# Patient Record
Sex: MALE | Race: WHITE | NOT HISPANIC OR LATINO | Employment: OTHER | URBAN - METROPOLITAN AREA
[De-identification: names, ages, dates, MRNs, and addresses within clinical notes are randomized per-mention and may not be internally consistent; named-entity substitution may affect disease eponyms.]

---

## 2018-01-25 ENCOUNTER — APPOINTMENT (OUTPATIENT)
Dept: RADIOLOGY | Facility: MEDICAL CENTER | Age: 68
DRG: 871 | End: 2018-01-25
Attending: EMERGENCY MEDICINE
Payer: MEDICARE

## 2018-01-25 ENCOUNTER — HOSPITAL ENCOUNTER (INPATIENT)
Facility: MEDICAL CENTER | Age: 68
LOS: 5 days | DRG: 871 | End: 2018-01-31
Attending: EMERGENCY MEDICINE | Admitting: HOSPITALIST
Payer: MEDICARE

## 2018-01-25 DIAGNOSIS — N25.0 RENAL OSTEODYSTROPHY: ICD-10-CM

## 2018-01-25 DIAGNOSIS — I50.23 ACUTE ON CHRONIC SYSTOLIC HEART FAILURE (HCC): ICD-10-CM

## 2018-01-25 DIAGNOSIS — R09.02 HYPOXIA: ICD-10-CM

## 2018-01-25 DIAGNOSIS — G47.33 OBSTRUCTIVE SLEEP APNEA: Chronic | ICD-10-CM
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
## 2018-01-25 DIAGNOSIS — N17.9 AKI (ACUTE KIDNEY INJURY) (HCC): ICD-10-CM

## 2018-01-25 DIAGNOSIS — J11.1 INFLUENZA: ICD-10-CM

## 2018-01-25 DIAGNOSIS — I50.21 ACUTE SYSTOLIC HEART FAILURE (HCC): ICD-10-CM

## 2018-01-25 DIAGNOSIS — R53.1 GENERALIZED WEAKNESS: ICD-10-CM

## 2018-01-25 DIAGNOSIS — E66.9 OBESITY, UNSPECIFIED CLASSIFICATION, UNSPECIFIED OBESITY TYPE, UNSPECIFIED WHETHER SERIOUS COMORBIDITY PRESENT: ICD-10-CM

## 2018-01-25 LAB
ALBUMIN SERPL BCP-MCNC: 3.2 G/DL (ref 3.2–4.9)
ALBUMIN/GLOB SERPL: 0.8 G/DL
ALP SERPL-CCNC: 74 U/L (ref 30–99)
ALT SERPL-CCNC: 11 U/L (ref 2–50)
ANION GAP SERPL CALC-SCNC: 8 MMOL/L (ref 0–11.9)
APPEARANCE UR: CLEAR
AST SERPL-CCNC: 13 U/L (ref 12–45)
BACTERIA #/AREA URNS HPF: NEGATIVE /HPF
BASOPHILS # BLD AUTO: 0.5 % (ref 0–1.8)
BASOPHILS # BLD: 0.04 K/UL (ref 0–0.12)
BILIRUB SERPL-MCNC: 0.3 MG/DL (ref 0.1–1.5)
BILIRUB UR QL STRIP.AUTO: NEGATIVE
BUN SERPL-MCNC: 59 MG/DL (ref 8–22)
CALCIUM SERPL-MCNC: 9.5 MG/DL (ref 8.5–10.5)
CHLORIDE SERPL-SCNC: 109 MMOL/L (ref 96–112)
CO2 SERPL-SCNC: 18 MMOL/L (ref 20–33)
COLOR UR: YELLOW
CREAT SERPL-MCNC: 5.66 MG/DL (ref 0.5–1.4)
EOSINOPHIL # BLD AUTO: 0.46 K/UL (ref 0–0.51)
EOSINOPHIL NFR BLD: 5.9 % (ref 0–6.9)
EPI CELLS #/AREA URNS HPF: NEGATIVE /HPF
ERYTHROCYTE [DISTWIDTH] IN BLOOD BY AUTOMATED COUNT: 44.2 FL (ref 35.9–50)
FLUAV RNA SPEC QL NAA+PROBE: POSITIVE
FLUBV RNA SPEC QL NAA+PROBE: NEGATIVE
GLOBULIN SER CALC-MCNC: 4.1 G/DL (ref 1.9–3.5)
GLUCOSE SERPL-MCNC: 129 MG/DL (ref 65–99)
GLUCOSE UR STRIP.AUTO-MCNC: 250 MG/DL
HCT VFR BLD AUTO: 31.3 % (ref 42–52)
HGB BLD-MCNC: 10.1 G/DL (ref 14–18)
HYALINE CASTS #/AREA URNS LPF: ABNORMAL /LPF
IMM GRANULOCYTES # BLD AUTO: 0.08 K/UL (ref 0–0.11)
IMM GRANULOCYTES NFR BLD AUTO: 1 % (ref 0–0.9)
INR PPP: 2.9 (ref 0.87–1.13)
KETONES UR STRIP.AUTO-MCNC: NEGATIVE MG/DL
LACTATE BLD-SCNC: 0.9 MMOL/L (ref 0.5–2)
LACTATE BLD-SCNC: 1.2 MMOL/L (ref 0.5–2)
LEUKOCYTE ESTERASE UR QL STRIP.AUTO: NEGATIVE
LYMPHOCYTES # BLD AUTO: 0.74 K/UL (ref 1–4.8)
LYMPHOCYTES NFR BLD: 9.5 % (ref 22–41)
MCH RBC QN AUTO: 29 PG (ref 27–33)
MCHC RBC AUTO-ENTMCNC: 32.3 G/DL (ref 33.7–35.3)
MCV RBC AUTO: 89.9 FL (ref 81.4–97.8)
MICRO URNS: ABNORMAL
MONOCYTES # BLD AUTO: 0.82 K/UL (ref 0–0.85)
MONOCYTES NFR BLD AUTO: 10.5 % (ref 0–13.4)
NEUTROPHILS # BLD AUTO: 5.67 K/UL (ref 1.82–7.42)
NEUTROPHILS NFR BLD: 72.6 % (ref 44–72)
NITRITE UR QL STRIP.AUTO: NEGATIVE
NRBC # BLD AUTO: 0 K/UL
NRBC BLD-RTO: 0 /100 WBC
PH UR STRIP.AUTO: 6 [PH]
PLATELET # BLD AUTO: 187 K/UL (ref 164–446)
PMV BLD AUTO: 11.2 FL (ref 9–12.9)
POTASSIUM SERPL-SCNC: 5.5 MMOL/L (ref 3.6–5.5)
PROT SERPL-MCNC: 7.3 G/DL (ref 6–8.2)
PROT UR QL STRIP: >=1000 MG/DL
PROTHROMBIN TIME: 30 SEC (ref 12–14.6)
RBC # BLD AUTO: 3.48 M/UL (ref 4.7–6.1)
RBC # URNS HPF: ABNORMAL /HPF
RBC UR QL AUTO: ABNORMAL
SODIUM SERPL-SCNC: 135 MMOL/L (ref 135–145)
SP GR UR STRIP.AUTO: 1.02
UROBILINOGEN UR STRIP.AUTO-MCNC: 0.2 MG/DL
WBC # BLD AUTO: 7.8 K/UL (ref 4.8–10.8)
WBC #/AREA URNS HPF: ABNORMAL /HPF

## 2018-01-25 PROCEDURE — 36415 COLL VENOUS BLD VENIPUNCTURE: CPT

## 2018-01-25 PROCEDURE — 700105 HCHG RX REV CODE 258: Performed by: EMERGENCY MEDICINE

## 2018-01-25 PROCEDURE — 85025 COMPLETE CBC W/AUTO DIFF WBC: CPT

## 2018-01-25 PROCEDURE — 304561 HCHG STAT O2

## 2018-01-25 PROCEDURE — 74176 CT ABD & PELVIS W/O CONTRAST: CPT

## 2018-01-25 PROCEDURE — 70450 CT HEAD/BRAIN W/O DYE: CPT

## 2018-01-25 PROCEDURE — 96365 THER/PROPH/DIAG IV INF INIT: CPT

## 2018-01-25 PROCEDURE — 81001 URINALYSIS AUTO W/SCOPE: CPT

## 2018-01-25 PROCEDURE — 700111 HCHG RX REV CODE 636 W/ 250 OVERRIDE (IP): Performed by: EMERGENCY MEDICINE

## 2018-01-25 PROCEDURE — 87086 URINE CULTURE/COLONY COUNT: CPT

## 2018-01-25 PROCEDURE — 71045 X-RAY EXAM CHEST 1 VIEW: CPT

## 2018-01-25 PROCEDURE — 96367 TX/PROPH/DG ADDL SEQ IV INF: CPT

## 2018-01-25 PROCEDURE — 83605 ASSAY OF LACTIC ACID: CPT

## 2018-01-25 PROCEDURE — 87040 BLOOD CULTURE FOR BACTERIA: CPT

## 2018-01-25 PROCEDURE — 87502 INFLUENZA DNA AMP PROBE: CPT

## 2018-01-25 PROCEDURE — 85610 PROTHROMBIN TIME: CPT

## 2018-01-25 PROCEDURE — 96366 THER/PROPH/DIAG IV INF ADDON: CPT

## 2018-01-25 PROCEDURE — A9270 NON-COVERED ITEM OR SERVICE: HCPCS | Performed by: EMERGENCY MEDICINE

## 2018-01-25 PROCEDURE — 99285 EMERGENCY DEPT VISIT HI MDM: CPT

## 2018-01-25 PROCEDURE — 700102 HCHG RX REV CODE 250 W/ 637 OVERRIDE(OP): Performed by: EMERGENCY MEDICINE

## 2018-01-25 PROCEDURE — 80053 COMPREHEN METABOLIC PANEL: CPT

## 2018-01-25 RX ORDER — ACETAMINOPHEN 325 MG/1
975 TABLET ORAL ONCE
Status: COMPLETED | OUTPATIENT
Start: 2018-01-25 | End: 2018-01-25

## 2018-01-25 RX ORDER — SODIUM CHLORIDE 9 MG/ML
500 INJECTION, SOLUTION INTRAVENOUS ONCE
Status: COMPLETED | OUTPATIENT
Start: 2018-01-25 | End: 2018-01-25

## 2018-01-25 RX ORDER — OSELTAMIVIR PHOSPHATE 30 MG/1
30 CAPSULE ORAL ONCE
Status: COMPLETED | OUTPATIENT
Start: 2018-01-25 | End: 2018-01-25

## 2018-01-25 RX ADMIN — TAZOBACTAM SODIUM AND PIPERACILLIN SODIUM 3.38 G: 375; 3 INJECTION, SOLUTION INTRAVENOUS at 20:38

## 2018-01-25 RX ADMIN — VANCOMYCIN HYDROCHLORIDE 2700 MG: 100 INJECTION, POWDER, LYOPHILIZED, FOR SOLUTION INTRAVENOUS at 21:07

## 2018-01-25 RX ADMIN — OSELTAMIVIR PHOSPHATE 30 MG: 30 CAPSULE ORAL at 22:27

## 2018-01-25 RX ADMIN — ACETAMINOPHEN 975 MG: 325 TABLET, FILM COATED ORAL at 20:37

## 2018-01-25 RX ADMIN — SODIUM CHLORIDE 500 ML: 9 INJECTION, SOLUTION INTRAVENOUS at 19:55

## 2018-01-25 ASSESSMENT — PAIN SCALES - GENERAL: PAINLEVEL_OUTOF10: 0

## 2018-01-26 ENCOUNTER — APPOINTMENT (OUTPATIENT)
Dept: RADIOLOGY | Facility: MEDICAL CENTER | Age: 68
DRG: 871 | End: 2018-01-26
Attending: STUDENT IN AN ORGANIZED HEALTH CARE EDUCATION/TRAINING PROGRAM
Payer: MEDICARE

## 2018-01-26 ENCOUNTER — RESOLUTE PROFESSIONAL BILLING HOSPITAL PROF FEE (OUTPATIENT)
Dept: HOSPITALIST | Facility: MEDICAL CENTER | Age: 68
End: 2018-01-26
Payer: COMMERCIAL

## 2018-01-26 PROBLEM — E03.9 HYPOTHYROIDISM: Chronic | Status: ACTIVE | Noted: 2018-01-26

## 2018-01-26 PROBLEM — R09.02 HYPOXIA: Status: ACTIVE | Noted: 2018-01-26

## 2018-01-26 PROBLEM — E03.9 HYPOTHYROIDISM: Status: ACTIVE | Noted: 2018-01-26

## 2018-01-26 PROBLEM — B19.20 HEPATITIS C: Chronic | Status: ACTIVE | Noted: 2018-01-26

## 2018-01-26 PROBLEM — R53.1 GENERALIZED WEAKNESS: Status: ACTIVE | Noted: 2018-01-26

## 2018-01-26 PROBLEM — B19.20 HEPATITIS C: Status: ACTIVE | Noted: 2018-01-26

## 2018-01-26 PROBLEM — R31.9 HEMATURIA: Status: ACTIVE | Noted: 2018-01-26

## 2018-01-26 PROBLEM — I10 HYPERTENSION: Chronic | Status: ACTIVE | Noted: 2018-01-26

## 2018-01-26 PROBLEM — J11.1 INFLUENZA: Status: RESOLVED | Noted: 2018-01-26 | Resolved: 2018-01-26

## 2018-01-26 PROBLEM — Z95.2 H/O AORTIC VALVE REPLACEMENT: Status: ACTIVE | Noted: 2018-01-26

## 2018-01-26 PROBLEM — E11.9 DIABETES (HCC): Status: ACTIVE | Noted: 2018-01-26

## 2018-01-26 PROBLEM — E78.5 HYPERLIPIDEMIA: Status: ACTIVE | Noted: 2018-01-26

## 2018-01-26 PROBLEM — G47.33 OBSTRUCTIVE SLEEP APNEA: Chronic | Status: ACTIVE | Noted: 2018-01-26

## 2018-01-26 PROBLEM — J11.1 INFLUENZA: Status: ACTIVE | Noted: 2018-01-26

## 2018-01-26 PROBLEM — N25.0 RENAL OSTEODYSTROPHY: Status: ACTIVE | Noted: 2018-01-26
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
## 2018-01-26 PROBLEM — N18.6 ESRD (END STAGE RENAL DISEASE) (HCC): Status: ACTIVE | Noted: 2018-01-26

## 2018-01-26 PROBLEM — R53.1 GENERALIZED WEAKNESS: Status: RESOLVED | Noted: 2018-01-26 | Resolved: 2018-01-26

## 2018-01-26 PROBLEM — G47.33 OBSTRUCTIVE SLEEP APNEA: Status: ACTIVE | Noted: 2018-01-26

## 2018-01-26 PROBLEM — D64.9 ANEMIA: Status: ACTIVE | Noted: 2018-01-26

## 2018-01-26 PROBLEM — E78.5 HYPERLIPIDEMIA: Chronic | Status: ACTIVE | Noted: 2018-01-26

## 2018-01-26 PROBLEM — I44.2 COMPLETE HEART BLOCK (HCC): Status: ACTIVE | Noted: 2018-01-26

## 2018-01-26 PROBLEM — E66.9 OBESITY: Status: ACTIVE | Noted: 2018-01-26

## 2018-01-26 PROBLEM — A41.9 SEPSIS (HCC): Status: ACTIVE | Noted: 2018-01-26

## 2018-01-26 PROBLEM — I10 HYPERTENSION: Status: ACTIVE | Noted: 2018-01-26

## 2018-01-26 LAB
25(OH)D3 SERPL-MCNC: 24 NG/ML (ref 30–100)
ALBUMIN SERPL BCP-MCNC: 3.3 G/DL (ref 3.2–4.9)
ALBUMIN/GLOB SERPL: 1.1 G/DL
ALP SERPL-CCNC: 53 U/L (ref 30–99)
ALT SERPL-CCNC: 13 U/L (ref 2–50)
ANION GAP SERPL CALC-SCNC: 8 MMOL/L (ref 0–11.9)
AST SERPL-CCNC: 18 U/L (ref 12–45)
BASE EXCESS BLDA CALC-SCNC: -10 MMOL/L (ref -4–3)
BASOPHILS # BLD AUTO: 0.8 % (ref 0–1.8)
BASOPHILS # BLD: 0.05 K/UL (ref 0–0.12)
BILIRUB SERPL-MCNC: 0.3 MG/DL (ref 0.1–1.5)
BODY TEMPERATURE: ABNORMAL CENTIGRADE
BUN SERPL-MCNC: 59 MG/DL (ref 8–22)
CALCIUM SERPL-MCNC: 8.5 MG/DL (ref 8.5–10.5)
CHLORIDE SERPL-SCNC: 116 MMOL/L (ref 96–112)
CHOLEST SERPL-MCNC: 145 MG/DL (ref 100–199)
CO2 SERPL-SCNC: 15 MMOL/L (ref 20–33)
CREAT SERPL-MCNC: 5.29 MG/DL (ref 0.5–1.4)
EKG IMPRESSION: NORMAL
EOSINOPHIL # BLD AUTO: 0.33 K/UL (ref 0–0.51)
EOSINOPHIL NFR BLD: 5.3 % (ref 0–6.9)
ERYTHROCYTE [DISTWIDTH] IN BLOOD BY AUTOMATED COUNT: 45.5 FL (ref 35.9–50)
EST. AVERAGE GLUCOSE BLD GHB EST-MCNC: 143 MG/DL
FERRITIN SERPL-MCNC: 43.1 NG/ML (ref 22–322)
FOLATE SERPL-MCNC: >23.5 NG/ML
GLOBULIN SER CALC-MCNC: 2.9 G/DL (ref 1.9–3.5)
GLUCOSE BLD-MCNC: 106 MG/DL (ref 65–99)
GLUCOSE BLD-MCNC: 108 MG/DL (ref 65–99)
GLUCOSE BLD-MCNC: 162 MG/DL (ref 65–99)
GLUCOSE BLD-MCNC: 178 MG/DL (ref 65–99)
GLUCOSE SERPL-MCNC: 98 MG/DL (ref 65–99)
HBA1C MFR BLD: 6.6 % (ref 0–5.6)
HCO3 BLDA-SCNC: 15 MMOL/L (ref 17–25)
HCT VFR BLD AUTO: 28.8 % (ref 42–52)
HDLC SERPL-MCNC: 19 MG/DL
HGB BLD-MCNC: 9.3 G/DL (ref 14–18)
IMM GRANULOCYTES # BLD AUTO: 0.05 K/UL (ref 0–0.11)
IMM GRANULOCYTES NFR BLD AUTO: 0.8 % (ref 0–0.9)
INHALED O2 FLOW RATE: 3 L/MIN (ref 2–10)
INR PPP: 2.41 (ref 0.87–1.13)
IRON SATN MFR SERPL: 14 % (ref 15–55)
IRON SERPL-MCNC: 36 UG/DL (ref 50–180)
LDLC SERPL CALC-MCNC: 85 MG/DL
LYMPHOCYTES # BLD AUTO: 1.12 K/UL (ref 1–4.8)
LYMPHOCYTES NFR BLD: 18.2 % (ref 22–41)
MAGNESIUM SERPL-MCNC: 1.8 MG/DL (ref 1.5–2.5)
MCH RBC QN AUTO: 29.4 PG (ref 27–33)
MCHC RBC AUTO-ENTMCNC: 32.3 G/DL (ref 33.7–35.3)
MCV RBC AUTO: 91.1 FL (ref 81.4–97.8)
MONOCYTES # BLD AUTO: 0.87 K/UL (ref 0–0.85)
MONOCYTES NFR BLD AUTO: 14.1 % (ref 0–13.4)
NEUTROPHILS # BLD AUTO: 3.75 K/UL (ref 1.82–7.42)
NEUTROPHILS NFR BLD: 60.8 % (ref 44–72)
NRBC # BLD AUTO: 0 K/UL
NRBC BLD-RTO: 0 /100 WBC
PCO2 BLDA: 26.8 MMHG (ref 26–37)
PH BLDA: 7.35 [PH] (ref 7.4–7.5)
PHOSPHATE SERPL-MCNC: 5.9 MG/DL (ref 2.5–4.5)
PLATELET # BLD AUTO: 169 K/UL (ref 164–446)
PMV BLD AUTO: 11.9 FL (ref 9–12.9)
PO2 BLDA: 70 MMHG (ref 64–87)
POTASSIUM SERPL-SCNC: 5.2 MMOL/L (ref 3.6–5.5)
PROCALCITONIN SERPL-MCNC: 0.47 NG/ML
PROT SERPL-MCNC: 6.2 G/DL (ref 6–8.2)
PROTHROMBIN TIME: 25.9 SEC (ref 12–14.6)
PTH-INTACT SERPL-MCNC: 468.2 PG/ML (ref 14–72)
RBC # BLD AUTO: 3.16 M/UL (ref 4.7–6.1)
SAO2 % BLDA: 92.8 % (ref 93–99)
SODIUM SERPL-SCNC: 139 MMOL/L (ref 135–145)
TIBC SERPL-MCNC: 262 UG/DL (ref 250–450)
TRIGL SERPL-MCNC: 204 MG/DL (ref 0–149)
VIT B12 SERPL-MCNC: 370 PG/ML (ref 211–911)
WBC # BLD AUTO: 6.2 K/UL (ref 4.8–10.8)

## 2018-01-26 PROCEDURE — 82607 VITAMIN B-12: CPT

## 2018-01-26 PROCEDURE — 83036 HEMOGLOBIN GLYCOSYLATED A1C: CPT

## 2018-01-26 PROCEDURE — 700111 HCHG RX REV CODE 636 W/ 250 OVERRIDE (IP): Performed by: STUDENT IN AN ORGANIZED HEALTH CARE EDUCATION/TRAINING PROGRAM

## 2018-01-26 PROCEDURE — 80053 COMPREHEN METABOLIC PANEL: CPT

## 2018-01-26 PROCEDURE — 82803 BLOOD GASES ANY COMBINATION: CPT

## 2018-01-26 PROCEDURE — A9270 NON-COVERED ITEM OR SERVICE: HCPCS | Performed by: STUDENT IN AN ORGANIZED HEALTH CARE EDUCATION/TRAINING PROGRAM

## 2018-01-26 PROCEDURE — 83550 IRON BINDING TEST: CPT

## 2018-01-26 PROCEDURE — 83540 ASSAY OF IRON: CPT

## 2018-01-26 PROCEDURE — 82306 VITAMIN D 25 HYDROXY: CPT

## 2018-01-26 PROCEDURE — 99223 1ST HOSP IP/OBS HIGH 75: CPT | Mod: GC | Performed by: HOSPITALIST

## 2018-01-26 PROCEDURE — 76775 US EXAM ABDO BACK WALL LIM: CPT

## 2018-01-26 PROCEDURE — 93010 ELECTROCARDIOGRAM REPORT: CPT | Performed by: INTERNAL MEDICINE

## 2018-01-26 PROCEDURE — 700111 HCHG RX REV CODE 636 W/ 250 OVERRIDE (IP): Performed by: INTERNAL MEDICINE

## 2018-01-26 PROCEDURE — 82728 ASSAY OF FERRITIN: CPT

## 2018-01-26 PROCEDURE — A9270 NON-COVERED ITEM OR SERVICE: HCPCS | Performed by: HOSPITALIST

## 2018-01-26 PROCEDURE — 51798 US URINE CAPACITY MEASURE: CPT

## 2018-01-26 PROCEDURE — 83735 ASSAY OF MAGNESIUM: CPT

## 2018-01-26 PROCEDURE — 700102 HCHG RX REV CODE 250 W/ 637 OVERRIDE(OP): Performed by: HOSPITALIST

## 2018-01-26 PROCEDURE — 84145 PROCALCITONIN (PCT): CPT

## 2018-01-26 PROCEDURE — 83970 ASSAY OF PARATHORMONE: CPT

## 2018-01-26 PROCEDURE — 71045 X-RAY EXAM CHEST 1 VIEW: CPT

## 2018-01-26 PROCEDURE — 93005 ELECTROCARDIOGRAM TRACING: CPT | Performed by: INTERNAL MEDICINE

## 2018-01-26 PROCEDURE — 700105 HCHG RX REV CODE 258: Performed by: INTERNAL MEDICINE

## 2018-01-26 PROCEDURE — 36415 COLL VENOUS BLD VENIPUNCTURE: CPT

## 2018-01-26 PROCEDURE — 85610 PROTHROMBIN TIME: CPT

## 2018-01-26 PROCEDURE — 700105 HCHG RX REV CODE 258: Performed by: STUDENT IN AN ORGANIZED HEALTH CARE EDUCATION/TRAINING PROGRAM

## 2018-01-26 PROCEDURE — 80061 LIPID PANEL: CPT

## 2018-01-26 PROCEDURE — 700102 HCHG RX REV CODE 250 W/ 637 OVERRIDE(OP): Performed by: STUDENT IN AN ORGANIZED HEALTH CARE EDUCATION/TRAINING PROGRAM

## 2018-01-26 PROCEDURE — 85025 COMPLETE CBC W/AUTO DIFF WBC: CPT

## 2018-01-26 PROCEDURE — 84100 ASSAY OF PHOSPHORUS: CPT

## 2018-01-26 PROCEDURE — 770020 HCHG ROOM/CARE - TELE (206)

## 2018-01-26 PROCEDURE — 82962 GLUCOSE BLOOD TEST: CPT | Mod: 91

## 2018-01-26 PROCEDURE — 82746 ASSAY OF FOLIC ACID SERUM: CPT

## 2018-01-26 RX ORDER — CALCITRIOL 0.25 UG/1
.25-.5 CAPSULE, LIQUID FILLED ORAL SEE ADMIN INSTRUCTIONS
COMMUNITY

## 2018-01-26 RX ORDER — LEVOTHYROXINE SODIUM 0.12 MG/1
125 TABLET ORAL EVERY MORNING
COMMUNITY

## 2018-01-26 RX ORDER — CALCITRIOL 0.25 UG/1
0.25 CAPSULE, LIQUID FILLED ORAL EVERY MORNING
Status: DISCONTINUED | OUTPATIENT
Start: 2018-01-26 | End: 2018-01-31 | Stop reason: HOSPADM

## 2018-01-26 RX ORDER — WARFARIN SODIUM 2.5 MG/1
2.5 TABLET ORAL
Status: DISCONTINUED | OUTPATIENT
Start: 2018-01-26 | End: 2018-01-31 | Stop reason: HOSPADM

## 2018-01-26 RX ORDER — SILDENAFIL 100 MG/1
50 TABLET, FILM COATED ORAL PRN
COMMUNITY
End: 2019-01-01

## 2018-01-26 RX ORDER — LEVOTHYROXINE SODIUM 0.12 MG/1
125 TABLET ORAL EVERY MORNING
Status: DISCONTINUED | OUTPATIENT
Start: 2018-01-26 | End: 2018-01-31 | Stop reason: HOSPADM

## 2018-01-26 RX ORDER — BISACODYL 10 MG
10 SUPPOSITORY, RECTAL RECTAL
Status: DISCONTINUED | OUTPATIENT
Start: 2018-01-26 | End: 2018-01-31 | Stop reason: HOSPADM

## 2018-01-26 RX ORDER — HYDRALAZINE HYDROCHLORIDE 25 MG/1
25 TABLET, FILM COATED ORAL 3 TIMES DAILY
COMMUNITY
End: 2019-01-01

## 2018-01-26 RX ORDER — WARFARIN SODIUM 2.5 MG/1
2.5 TABLET ORAL
Status: COMPLETED | OUTPATIENT
Start: 2018-01-26 | End: 2018-01-26

## 2018-01-26 RX ORDER — WARFARIN SODIUM 5 MG/1
2.5-5 TABLET ORAL EVERY EVENING
COMMUNITY

## 2018-01-26 RX ORDER — HEPARIN SODIUM 5000 [USP'U]/ML
5000 INJECTION, SOLUTION INTRAVENOUS; SUBCUTANEOUS EVERY 8 HOURS
Status: DISCONTINUED | OUTPATIENT
Start: 2018-01-26 | End: 2018-01-26

## 2018-01-26 RX ORDER — SODIUM CHLORIDE 9 MG/ML
INJECTION, SOLUTION INTRAVENOUS CONTINUOUS
Status: DISCONTINUED | OUTPATIENT
Start: 2018-01-26 | End: 2018-01-26

## 2018-01-26 RX ORDER — ERGOCALCIFEROL 1.25 MG/1
50000 CAPSULE ORAL
COMMUNITY

## 2018-01-26 RX ORDER — LOVASTATIN 20 MG/1
40 TABLET ORAL NIGHTLY
Status: DISCONTINUED | OUTPATIENT
Start: 2018-01-26 | End: 2018-01-31 | Stop reason: HOSPADM

## 2018-01-26 RX ORDER — AMLODIPINE BESYLATE 10 MG/1
10 TABLET ORAL EVERY MORNING
Status: DISCONTINUED | OUTPATIENT
Start: 2018-01-26 | End: 2018-01-31 | Stop reason: HOSPADM

## 2018-01-26 RX ORDER — AMLODIPINE BESYLATE 10 MG/1
10 TABLET ORAL EVERY MORNING
COMMUNITY
End: 2019-01-01

## 2018-01-26 RX ORDER — METOLAZONE 2.5 MG/1
2.5 TABLET ORAL EVERY MORNING
Status: ON HOLD | COMMUNITY
End: 2018-01-31

## 2018-01-26 RX ORDER — POLYVINYL ALCOHOL 14 MG/ML
1 SOLUTION/ DROPS OPHTHALMIC 4 TIMES DAILY PRN
COMMUNITY

## 2018-01-26 RX ORDER — PREGABALIN 75 MG/1
150 CAPSULE ORAL EVERY EVENING
Status: DISCONTINUED | OUTPATIENT
Start: 2018-01-26 | End: 2018-01-31 | Stop reason: HOSPADM

## 2018-01-26 RX ORDER — OSELTAMIVIR PHOSPHATE 30 MG/1
30 CAPSULE ORAL
Status: COMPLETED | OUTPATIENT
Start: 2018-01-26 | End: 2018-01-29

## 2018-01-26 RX ORDER — FUROSEMIDE 80 MG
80 TABLET ORAL EVERY MORNING
Status: ON HOLD | COMMUNITY
End: 2018-01-31

## 2018-01-26 RX ORDER — INSULIN GLARGINE 100 [IU]/ML
35 INJECTION, SOLUTION SUBCUTANEOUS NIGHTLY
COMMUNITY

## 2018-01-26 RX ORDER — INSULIN GLARGINE 100 [IU]/ML
15 INJECTION, SOLUTION SUBCUTANEOUS NIGHTLY
Status: DISCONTINUED | OUTPATIENT
Start: 2018-01-26 | End: 2018-01-31 | Stop reason: HOSPADM

## 2018-01-26 RX ORDER — DEXTROSE MONOHYDRATE 25 G/50ML
25 INJECTION, SOLUTION INTRAVENOUS
Status: DISCONTINUED | OUTPATIENT
Start: 2018-01-26 | End: 2018-01-31 | Stop reason: HOSPADM

## 2018-01-26 RX ORDER — LOVASTATIN 40 MG/1
40 TABLET ORAL NIGHTLY
COMMUNITY
End: 2019-01-01

## 2018-01-26 RX ORDER — WARFARIN SODIUM 5 MG/1
5 TABLET ORAL
Status: DISCONTINUED | OUTPATIENT
Start: 2018-01-28 | End: 2018-01-31 | Stop reason: HOSPADM

## 2018-01-26 RX ORDER — METOPROLOL SUCCINATE 50 MG/1
150 TABLET, EXTENDED RELEASE ORAL EVERY MORNING
Status: DISCONTINUED | OUTPATIENT
Start: 2018-01-26 | End: 2018-01-31 | Stop reason: HOSPADM

## 2018-01-26 RX ORDER — LIDOCAINE 40 MG/G
1 CREAM TOPICAL 4 TIMES DAILY PRN
Status: DISCONTINUED | OUTPATIENT
Start: 2018-01-26 | End: 2018-01-31 | Stop reason: HOSPADM

## 2018-01-26 RX ORDER — AMOXICILLIN 250 MG
2 CAPSULE ORAL 2 TIMES DAILY
Status: DISCONTINUED | OUTPATIENT
Start: 2018-01-26 | End: 2018-01-31 | Stop reason: HOSPADM

## 2018-01-26 RX ORDER — ACETAMINOPHEN 325 MG/1
650 TABLET ORAL EVERY 6 HOURS PRN
Status: DISCONTINUED | OUTPATIENT
Start: 2018-01-26 | End: 2018-01-31 | Stop reason: HOSPADM

## 2018-01-26 RX ORDER — POLYVINYL ALCOHOL 14 MG/ML
1 SOLUTION/ DROPS OPHTHALMIC EVERY 4 HOURS PRN
Status: DISCONTINUED | OUTPATIENT
Start: 2018-01-26 | End: 2018-01-31 | Stop reason: HOSPADM

## 2018-01-26 RX ORDER — METOPROLOL SUCCINATE 50 MG/1
75 TABLET, EXTENDED RELEASE ORAL EVERY MORNING
COMMUNITY

## 2018-01-26 RX ORDER — AZITHROMYCIN 250 MG/1
250 TABLET, FILM COATED ORAL DAILY
Status: DISCONTINUED | OUTPATIENT
Start: 2018-01-27 | End: 2018-01-26

## 2018-01-26 RX ORDER — LIDOCAINE 40 MG/G
1 CREAM TOPICAL PRN
COMMUNITY
End: 2019-01-01

## 2018-01-26 RX ORDER — AZITHROMYCIN 250 MG/1
500 TABLET, FILM COATED ORAL ONCE
Status: DISCONTINUED | OUTPATIENT
Start: 2018-01-26 | End: 2018-01-26

## 2018-01-26 RX ORDER — LANOLIN ALCOHOL/MO/W.PET/CERES
9 CREAM (GRAM) TOPICAL
COMMUNITY
End: 2019-01-01

## 2018-01-26 RX ORDER — HYDRALAZINE HYDROCHLORIDE 25 MG/1
25 TABLET, FILM COATED ORAL 3 TIMES DAILY
Status: DISCONTINUED | OUTPATIENT
Start: 2018-01-26 | End: 2018-01-31 | Stop reason: HOSPADM

## 2018-01-26 RX ORDER — PREGABALIN 75 MG/1
75 CAPSULE ORAL EVERY MORNING
COMMUNITY

## 2018-01-26 RX ORDER — POLYETHYLENE GLYCOL 3350 17 G/17G
1 POWDER, FOR SOLUTION ORAL
Status: DISCONTINUED | OUTPATIENT
Start: 2018-01-26 | End: 2018-01-31 | Stop reason: HOSPADM

## 2018-01-26 RX ADMIN — STANDARDIZED SENNA CONCENTRATE AND DOCUSATE SODIUM 2 TABLET: 8.6; 5 TABLET, FILM COATED ORAL at 10:46

## 2018-01-26 RX ADMIN — AZITHROMYCIN FOR INJECTION INJECTION, POWDER, LYOPHILIZED, FOR SOLUTION 500 MG: 500 INJECTION INTRAVENOUS at 09:00

## 2018-01-26 RX ADMIN — CALCITRIOL 0.25 MCG: 0.25 CAPSULE, LIQUID FILLED ORAL at 10:27

## 2018-01-26 RX ADMIN — AMLODIPINE BESYLATE 10 MG: 10 TABLET ORAL at 10:28

## 2018-01-26 RX ADMIN — LOVASTATIN 40 MG: 20 TABLET ORAL at 22:14

## 2018-01-26 RX ADMIN — OSELTAMIVIR PHOSPHATE 30 MG: 30 CAPSULE ORAL at 22:14

## 2018-01-26 RX ADMIN — HYDRALAZINE HYDROCHLORIDE 25 MG: 25 TABLET, FILM COATED ORAL at 17:19

## 2018-01-26 RX ADMIN — HYDRALAZINE HYDROCHLORIDE 25 MG: 25 TABLET, FILM COATED ORAL at 10:28

## 2018-01-26 RX ADMIN — LEVOTHYROXINE SODIUM 125 MCG: 125 TABLET ORAL at 10:28

## 2018-01-26 RX ADMIN — METOPROLOL SUCCINATE 150 MG: 50 TABLET, FILM COATED, EXTENDED RELEASE ORAL at 10:46

## 2018-01-26 RX ADMIN — ASPIRIN 81 MG: 81 TABLET, COATED ORAL at 22:14

## 2018-01-26 RX ADMIN — WARFARIN SODIUM 2.5 MG: 2.5 TABLET ORAL at 04:23

## 2018-01-26 RX ADMIN — WARFARIN SODIUM 2.5 MG: 2.5 TABLET ORAL at 17:20

## 2018-01-26 RX ADMIN — SODIUM CHLORIDE: 9 INJECTION, SOLUTION INTRAVENOUS at 04:24

## 2018-01-26 RX ADMIN — CEFTRIAXONE 2 G: 2 INJECTION, POWDER, FOR SOLUTION INTRAMUSCULAR; INTRAVENOUS at 10:27

## 2018-01-26 RX ADMIN — PREGABALIN 150 MG: 75 CAPSULE ORAL at 22:14

## 2018-01-26 RX ADMIN — HYDRALAZINE HYDROCHLORIDE 25 MG: 25 TABLET, FILM COATED ORAL at 22:14

## 2018-01-26 ASSESSMENT — COGNITIVE AND FUNCTIONAL STATUS - GENERAL
DAILY ACTIVITIY SCORE: 16
MOVING FROM LYING ON BACK TO SITTING ON SIDE OF FLAT BED: A LOT
MOVING TO AND FROM BED TO CHAIR: A LOT
MOVING TO AND FROM BED TO CHAIR: A LOT
DRESSING REGULAR LOWER BODY CLOTHING: A LOT
SUGGESTED CMS G CODE MODIFIER MOBILITY: CL
TOILETING: A LOT
PERSONAL GROOMING: A LITTLE
CLIMB 3 TO 5 STEPS WITH RAILING: TOTAL
MOBILITY SCORE: 11
WALKING IN HOSPITAL ROOM: TOTAL
TOILETING: A LOT
SUGGESTED CMS G CODE MODIFIER DAILY ACTIVITY: CK
PERSONAL GROOMING: A LITTLE
WALKING IN HOSPITAL ROOM: TOTAL
HELP NEEDED FOR BATHING: A LOT
DRESSING REGULAR UPPER BODY CLOTHING: A LITTLE
STANDING UP FROM CHAIR USING ARMS: A LOT
DAILY ACTIVITIY SCORE: 16
TURNING FROM BACK TO SIDE WHILE IN FLAT BAD: A LITTLE
MOBILITY SCORE: 11
STANDING UP FROM CHAIR USING ARMS: A LOT
DRESSING REGULAR LOWER BODY CLOTHING: A LOT
CLIMB 3 TO 5 STEPS WITH RAILING: TOTAL
HELP NEEDED FOR BATHING: A LOT
MOVING FROM LYING ON BACK TO SITTING ON SIDE OF FLAT BED: A LOT
TURNING FROM BACK TO SIDE WHILE IN FLAT BAD: A LITTLE
SUGGESTED CMS G CODE MODIFIER DAILY ACTIVITY: CK
DRESSING REGULAR UPPER BODY CLOTHING: A LITTLE
SUGGESTED CMS G CODE MODIFIER MOBILITY: CL

## 2018-01-26 ASSESSMENT — COPD QUESTIONNAIRES
HAVE YOU SMOKED AT LEAST 100 CIGARETTES IN YOUR ENTIRE LIFE: NO/DON'T KNOW
DO YOU EVER COUGH UP ANY MUCUS OR PHLEGM?: NO/ONLY WITH OCCASIONAL COLDS OR INFECTIONS
COPD SCREENING SCORE: 3
DURING THE PAST 4 WEEKS HOW MUCH DID YOU FEEL SHORT OF BREATH: SOME OF THE TIME

## 2018-01-26 ASSESSMENT — ENCOUNTER SYMPTOMS
ORTHOPNEA: 0
PND: 0
WHEEZING: 0
VOMITING: 0
ABDOMINAL PAIN: 0
NAUSEA: 0
SPUTUM PRODUCTION: 0
DIZZINESS: 0
LOSS OF CONSCIOUSNESS: 0
COUGH: 1
COUGH: 0
SENSORY CHANGE: 0
BRUISES/BLEEDS EASILY: 0
FLANK PAIN: 0
PALPITATIONS: 0
DOUBLE VISION: 0
NECK PAIN: 1
DEPRESSION: 0
SPEECH CHANGE: 0
DIZZINESS: 1
CONSTIPATION: 0
WEIGHT LOSS: 0
EYE PAIN: 0
FEVER: 1
PHOTOPHOBIA: 0
POLYDIPSIA: 0
FEVER: 0
DIARRHEA: 0
BACK PAIN: 0
DIAPHORESIS: 0
SHORTNESS OF BREATH: 1
HEADACHES: 0
WEAKNESS: 1
BLURRED VISION: 0
MYALGIAS: 0
SORE THROAT: 0
CHILLS: 0
FOCAL WEAKNESS: 0
FALLS: 1
CLAUDICATION: 0
SHORTNESS OF BREATH: 0

## 2018-01-26 ASSESSMENT — LIFESTYLE VARIABLES
HAVE PEOPLE ANNOYED YOU BY CRITICIZING YOUR DRINKING: NO
TOTAL SCORE: 0
ALCOHOL_USE: YES
EVER_SMOKED: NEVER
HOW MANY TIMES IN THE PAST YEAR HAVE YOU HAD 5 OR MORE DRINKS IN A DAY: 0
TOTAL SCORE: 0
EVER HAD A DRINK FIRST THING IN THE MORNING TO STEADY YOUR NERVES TO GET RID OF A HANGOVER: NO
EVER FELT BAD OR GUILTY ABOUT YOUR DRINKING: NO
HAVE YOU EVER FELT YOU SHOULD CUT DOWN ON YOUR DRINKING: NO
CONSUMPTION TOTAL: INCOMPLETE
TOTAL SCORE: 0

## 2018-01-26 ASSESSMENT — PATIENT HEALTH QUESTIONNAIRE - PHQ9
SUM OF ALL RESPONSES TO PHQ QUESTIONS 1-9: 0
1. LITTLE INTEREST OR PLEASURE IN DOING THINGS: NOT AT ALL
2. FEELING DOWN, DEPRESSED, IRRITABLE, OR HOPELESS: NOT AT ALL
SUM OF ALL RESPONSES TO PHQ9 QUESTIONS 1 AND 2: 0

## 2018-01-26 ASSESSMENT — PAIN SCALES - GENERAL
PAINLEVEL_OUTOF10: 4
PAINLEVEL_OUTOF10: 0

## 2018-01-26 NOTE — ASSESSMENT & PLAN NOTE
Likely secondary to influenza A infection with possible superimposed community acquired pneumonia. Chest x-ray shows bibasilar infiltrates. However, white count was normal on admission and patient was afebrile. Blood cultures were negative. Pro-calcitonin was 0.26, elevated.    - blood cultures show NGTD  - continue to wean oxygen as tolerated  - ECHO shows LVEF of 45%, hypokinetic apex  - ABG shows pH of 7.36, pCO2 of 26, HCO3 of 15, no evidence of chronic hypercapnia however possibly compensated due to chronically elevated BUN due to kidney failure  - continue tamiflu, rocephin, doxy - last doses  - rt protocol, continuous O2, IS  - continue diuresis

## 2018-01-26 NOTE — ED PROVIDER NOTES
"ER Provider Note     Scribed for Neil Mathis M.D. by Mio Lozano. 1/25/2018, 7:51 PM.    Primary Care Provider: None noted  Means of Arrival: EMS   History obtained from: Wife  History limited by: Altered mental status    CHIEF COMPLAINT  Chief Complaint   Patient presents with   • Weakness   • Fever       HPI  Efrain Martines is a 68 y.o. male with a history of congestive heart failure and diabetes who presents to the Emergency Department for evaluation of generalized weakness and altered mental status onset today. Wife reports patient fell onto his right side, striking his right cheek on 1/16/18. At that time, he was complaining of right sided rib pain. He has not had any xrays for this injury until today. Wife states [facility] He was initially okay at that time. Wife states at approximately 5 PM, patient was yelling in the living room, making wife believe he fell. However, patient did not fall and was sitting down watching TV. At approximately 6 PM, wife noticed patient with associated generalized body shaking. She was unable to get patient to stand up. Wife checked his sugar which was 137 at that time. She also mentions that patient was looking for something \"but did not know what he was looking for.\" Wife notes patient with a history of baseline confusion but states his confusion was increased today. She states patient reported an episode of vomiting earlier today. He then tried vomiting at 6 PM with no output. On exam, wife states patient's confusion is improved and closer to baseline. She mentions patient with a cough over the last two days. He has a fever today as well. Wife denies any dysuria, headache, or neck stiffness. Patient mentions having \"burning\" to his bilateral eyes. When asked, patient states he is unsure why he is in the ED today. Patient uses a CPAP at night. He has a pacemaker in place. He is a dialysis patient. Wife reports history " "of liver problems. She states patient was last admitted in 12/2017.    History is limited secondary to altered mental status.       REVIEW OF SYSTEMS  See HPI for further details.     ROS is limited secondary to altered mental status.   C    PAST MEDICAL HISTORY   has a past medical history of Cerebral aneurysm (1990's); Cervical radiculopathy; Chronic hepatitis C (CMS-HCC); Chronic kidney disease; CVA (cerebral vascular accident) (CMS-HCC) (1993); DM (diabetes mellitus) (CMS-HCC); HTN (hypertension); Hyperlipidemia; Hypothyroidism; Obstructive sleep apnea; Pacemaker; and Peripheral neuropathy (CMS-HCC).Liver problems  Dialysis patient  Pacemaker  Congestive heart failure  Diabetes    SURGICAL HISTORY   has a past surgical history that includes nephrectomy laparoscopic (Left); aortic valve replacement; and mitral valve replacement.    SOCIAL HISTORY  Accompanied by wife.     FAMILY HISTORY  No pertinent family history reported.     CURRENT MEDICATIONS  Reviewed.  See Encounter Summary.     ALLERGIES  No Known Allergies    PHYSICAL EXAM  VITAL SIGNS: BP (!) 166/87   Pulse (!) 107   Temp (!) 39.6 °C (103.3 °F)   Resp 18   Ht 1.803 m (5' 11\")   Wt 108 kg (238 lb 1.6 oz)   SpO2 92%   BMI 33.21 kg/m²    Constitutional: Alert in no apparent distress.  HENT: No signs of trauma to head, Bilateral external ears normal, Nose normal.   Eyes: Pupils are equal and reactive, Conjunctiva normal, Non-icteric.   Neck: Normal range of motion, No tenderness, Supple, No stridor. No meningismus  Lymphatic: No lymphadenopathy noted.   Cardiovascular: Tachycardic. Regular rhythm, no murmurs.   Thorax & Lungs: Slight crackles and rhonchi to bilateral lungs. No respiratory distress, No wheezing, No chest tenderness.   Abdomen: Bowel sounds present, Mildly distended, No tenderness, No masses, No pulsatile masses. No peritoneal signs.  Skin: Warm, Dry, No erythema, No rash.   Back: No bony tenderness, No CVA tenderness.   Extremities: " Intact distal pulses, 1+ edema bilateral lower extremities, No tenderness, No cyanosis.  Musculoskeletal: Good range of motion in all major joints. No tenderness to palpation or major deformities noted.   Neurologic: Alert , able to follow commands. Moving upper and lower extremities, Normal sensory function, No focal deficits noted.   Psychiatric: Mildly confused      DIAGNOSTIC STUDIES / PROCEDURES    LABS  Results for orders placed or performed during the hospital encounter of 01/25/18   Lactic acid (lactate)   Result Value Ref Range    Lactic Acid 1.2 0.5 - 2.0 mmol/L   Lactic acid (lactate)   Result Value Ref Range    Lactic Acid 0.9 0.5 - 2.0 mmol/L   CBC WITH DIFFERENTIAL   Result Value Ref Range    WBC 7.8 4.8 - 10.8 K/uL    RBC 3.48 (L) 4.70 - 6.10 M/uL    Hemoglobin 10.1 (L) 14.0 - 18.0 g/dL    Hematocrit 31.3 (L) 42.0 - 52.0 %    MCV 89.9 81.4 - 97.8 fL    MCH 29.0 27.0 - 33.0 pg    MCHC 32.3 (L) 33.7 - 35.3 g/dL    RDW 44.2 35.9 - 50.0 fL    Platelet Count 187 164 - 446 K/uL    MPV 11.2 9.0 - 12.9 fL    Neutrophils-Polys 72.60 (H) 44.00 - 72.00 %    Lymphocytes 9.50 (L) 22.00 - 41.00 %    Monocytes 10.50 0.00 - 13.40 %    Eosinophils 5.90 0.00 - 6.90 %    Basophils 0.50 0.00 - 1.80 %    Immature Granulocytes 1.00 (H) 0.00 - 0.90 %    Nucleated RBC 0.00 /100 WBC    Neutrophils (Absolute) 5.67 1.82 - 7.42 K/uL    Lymphs (Absolute) 0.74 (L) 1.00 - 4.80 K/uL    Monos (Absolute) 0.82 0.00 - 0.85 K/uL    Eos (Absolute) 0.46 0.00 - 0.51 K/uL    Baso (Absolute) 0.04 0.00 - 0.12 K/uL    Immature Granulocytes (abs) 0.08 0.00 - 0.11 K/uL    NRBC (Absolute) 0.00 K/uL   COMP METABOLIC PANEL   Result Value Ref Range    Sodium 135 135 - 145 mmol/L    Potassium 5.5 3.6 - 5.5 mmol/L    Chloride 109 96 - 112 mmol/L    Co2 18 (L) 20 - 33 mmol/L    Anion Gap 8.0 0.0 - 11.9    Glucose 129 (H) 65 - 99 mg/dL    Bun 59 (H) 8 - 22 mg/dL    Creatinine 5.66 (HH) 0.50 - 1.40 mg/dL    Calcium 9.5 8.5 - 10.5 mg/dL    AST(SGOT) 13 12  - 45 U/L    ALT(SGPT) 11 2 - 50 U/L    Alkaline Phosphatase 74 30 - 99 U/L    Total Bilirubin 0.3 0.1 - 1.5 mg/dL    Albumin 3.2 3.2 - 4.9 g/dL    Total Protein 7.3 6.0 - 8.2 g/dL    Globulin 4.1 (H) 1.9 - 3.5 g/dL    A-G Ratio 0.8 g/dL   URINALYSIS   Result Value Ref Range    Color Yellow     Character Clear     Specific Gravity 1.016 <1.035    Ph 6.0 5.0 - 8.0    Glucose 250 (A) Negative mg/dL    Ketones Negative Negative mg/dL    Protein >=1000 (A) Negative mg/dL    Bilirubin Negative Negative    Urobilinogen, Urine 0.2 Negative    Nitrite Negative Negative    Leukocyte Esterase Negative Negative    Occult Blood Moderate (A) Negative    Micro Urine Req Microscopic    INFLUENZA A/B BY PCR   Result Value Ref Range    Influenza virus A RNA POSITIVE (A) Negative    Influenza virus B, PCR Negative Negative   PROTHROMBIN TIME   Result Value Ref Range    PT 30.0 (H) 12.0 - 14.6 sec    INR 2.90 (H) 0.87 - 1.13   URINE MICROSCOPIC (W/UA)   Result Value Ref Range    WBC 0-2 (A) /hpf    RBC  (A) /hpf    Bacteria Negative None /hpf    Epithelial Cells Negative /hpf    Hyaline Cast 0-2 /lpf   ESTIMATED GFR   Result Value Ref Range    GFR If  12 (A) >60 mL/min/1.73 m 2    GFR If Non African American 10 (A) >60 mL/min/1.73 m 2      All labs reviewed by me.    RADIOLOGY  CT-RENAL COLIC EVALUATION(A/P W/O)   Final Result         1.  Hazy groundglass pulmonary opacities in the lung bases suggests mild edema or infiltrate.   2.  Atherosclerosis and atherosclerotic coronary artery disease   3.  Bilateral fat-containing inguinal hernias   4.  Large left lateral abdominal hernia containing small bowel.      CT-HEAD W/O   Final Result         1.  No acute intracranial abnormality is identified, there are changes of small vessel ischemic disease with mild atrophy noted.      DX-CHEST-PORTABLE (1 VIEW)   Final Result         1.  Left lung base atelectasis, cannot exclude early infiltrates.   2.  Atherosclerosis           The radiologist's interpretation of all radiological studies have been reviewed by me.    COURSE & MEDICAL DECISION MAKING  Pertinent Labs & Imaging studies reviewed. (See chart for details)    This is a 68 y.o. male that presents with weakness and fever. At this time I am concerned the patient may be septic  given his vital signs. I will cover him broadly with vancomycin and Zosyn. An additional influenza swab and check the patient's urine as well as chest x-ray for infection. I will give a small amount of fluids given the patient does have a history of congestive heart failure. CT scan given the patient is hit his head and is slightly confused.     7:51 PM - Patient seen and examined at bedside. Discussed plan of care which includes imaging, labs, and fever control. Patient and wife understand and agree. Ordered CT renal colic evaluation, CT head, DX chest, lactate, urine microscopic, PT, influenza a/b by PCR, estimated GFR, CBC, CMP, urinalysis, urine culture, blood culture.  Patient will be medicated with tylenol 975 mg, zosyn ivpb 3.375 g, vancomycin 2700 mg in  ml. The patient will be resuscitated with IV fluids due to acute kidney injury, tachycardia.      There was a good response to IV fluids.  for his symptoms.   His tachycardia improved    9:44 PM Ordered tamiflu     10:46 PM Paged [facility].    10:54 PM - I discussed the patient's case and the above findings with [facility] who will admit the patient.     Patient was found have a stable anemia. The patient's creatinine is significantly elevated at 5.66 which is up from the high fours when [facility]. He anemia is stable. The patient did have a elevated occult blood in his urine. Given this and the elevated creatinine CT scan of the patient's abdomen was ordered. He was found to be negative for any stones. The patient was found to be flu A+. In addition his INR is elevated at 2.9. The patient has no pneumonia on his chest x-ray.  His urinalysis does not show infection. At this time I will treat the patient with Tamiflu. Blood cultures were sent. The patient was to be admitted for his influenza as well as acute kidney injury on chronic as well as generalized weakness.        DISPOSITION:  Patient will be admitted to [facility] in gaurded condition.     FINAL IMPRESSION  1. Generalized weakness    2. Influenza    3. JOSE (acute kidney injury) (CMS-[facility])          Mio SFIUENTES (Scribe), am scribing for, and in the presence of, Neil Mathis M.D..    Electronically signed by: Mio Lozano (Scribe), 1/25/2018    INeil M.D. personally performed the services described in this documentation, as scribed by Mio Lozano in my presence, and it is both accurate and complete.     The note accurately reflects work and decisions made by me.  Neil Mathis  1/26/2018  1:49 AM

## 2018-01-26 NOTE — PROGRESS NOTES
Picked patient up from ED and assumed care. Patient A+Ox4, no complaints, family at bedside, whiteboard updated. Call light in reach.

## 2018-01-26 NOTE — ED NOTES
Efrain Martines  68 y.o.  male  Chief Complaint   Patient presents with   • Weakness   • Fever     Brought in by letysa from home. Patient c/o generalized weakness and unable to get up on bed. Febrile upon arrival to ED. Hx of CVA left side weakness. Patient alert and oriented. + cough x 2 days. FS- [phone]

## 2018-01-26 NOTE — PROGRESS NOTES
Inpatient Anticoagulation Service Note    Date: 1/26/2018  Reason for Anticoagulation: Bioprosthetic Valve Replacement (Aortic valve replacement)        Hemoglobin Value: (!) 9.3  Hematocrit Value: (!) 28.8  Lab Platelet Value: 169  Target INR: 2.0 to 3.0    INR from last 7 days     Date/Time INR Value    01/26/18 0853 (!)  2.41    01/25/18 1950 (!)  2.9        Dose from last 7 days     Date/Time Dose (mg)    01/26/18 1400  2.5    01/26/18 0500  2.5        Average Dose (mg):  (Home dose: 5mg Sun/Tues and 2.5mg AOD per medrec)  Significant Interactions: Antibiotics, Antiplatelet Medications, Statin, Thyroid Medications  Bridge Therapy: No     Reversal Agent Administered: Not Applicable  Comments: INR therapeutic. Continue current home dose as identified above. H/H dropped slightly but like dilution- CTM closely; no other indication of bleeding. DDI identified above.     Plan:  Warfarin 2.5mg with INR check tomorrow  Education Material Provided?: No (Chronic tx)  Pharmacist suggested discharge dosing: Warfarin 5mg PO every Sun/Tues and 2.5mg on all other days with close f/u within 3 days        Maime Phillips, PharmD., BCPS

## 2018-01-26 NOTE — ASSESSMENT & PLAN NOTE
Patient checks blood glucose weekly and are normally < 200's. Home regimen includes Lantus 45 units at bedtime. Hemoglobin A1c was 6.2 on November 2017 and 6.6 on January 26, 2018.    Plan;  -Continue home Lantus at 15 units at bedtime.  -Continue regular insulin sliding scale.   -Continue hypoglycemia protocol.

## 2018-01-26 NOTE — ASSESSMENT & PLAN NOTE
Low vitamin D level and elevated PTH with phosphate less than 10. Likely due to renal disease.    Plan:  -Continue home calcitriol 0.25 µg daily

## 2018-01-26 NOTE — ASSESSMENT & PLAN NOTE
Total cholesterol 145, HDL 19, LDL 85. ASCVD risk 29.7% in 10 years.    Plan:  -Continue home lovastatin 40 mg by mouth daily.

## 2018-01-26 NOTE — ASSESSMENT & PLAN NOTE
-170s/ 60-80s. Was previously on losartan but [facility].    Plan:   -Continue home amlodipine 10 mg  -Continue home hydralazine 25 mg   -Continue home metoprolol  mg per mouth daily

## 2018-01-26 NOTE — ASSESSMENT & PLAN NOTE
Status post carrying case of water and losing balance due to how heavy it was. Head CT was negative. No rib fractures on chest x-ray. 0 right rib contusion.    Plan:  -Continue Tylenol as needed for pain.

## 2018-01-26 NOTE — PROGRESS NOTES
Internal Medicine Interval Note  Note Author: Ania Roberson M.D.     Name Efrain Martines 1950   Age/Sex 68 y.o. male   MRN [id number]   Code Status Full Code     After 5PM or if no immediate response to page, please call for cross-coverage  Attending/Team: Dr. Farrar/Michael See Patient List for primary contact information  Call [phone] to page    1st Call - Day Intern (R1):   Dr. Roberson 2nd Call - Day Sr. Resident (R2/R3):   Dr. Vences     Reason for interval visit  (Principal Problem)   Hypoxia    Interval Problem Daily Status Update  (24 hours)   - Patient stable, states he feels better this AM, remains on 2-3L oxygen  - CXR shows L lung base atelectasis vs. Infiltrate, Influenza A positive  - Patient noted to be retaining urine, continue Q6H bladder scans, encourage urination  - Continue Rocephin, Azithromycin, Tamiflu, hold IVF due to CKD      Review of Systems   Constitutional: Negative for chills and fever.   HENT: Negative for congestion and sore throat.    Eyes: Negative for blurred vision and double vision.   Respiratory: Positive for shortness of breath. Negative for cough and wheezing.    Cardiovascular: Positive for leg swelling. Negative for chest pain, palpitations and orthopnea.   Gastrointestinal: Negative for abdominal pain, nausea and vomiting.   Genitourinary: Negative for dysuria and urgency.   Musculoskeletal: Positive for joint pain.   Skin: Negative for rash.   Neurological: Positive for weakness. Negative for dizziness and sensory change.   Psychiatric/Behavioral: Negative for depression.       Consultants/Specialty  None    Disposition  Inpatient requiring resolution of sepsis, IV abx    Quality Measures    Reviewed items::  EKG reviewed, Radiology images reviewed, Labs reviewed and Medications reviewed  Leach catheter::  No Leach  : No central line.  DVT prophylaxis pharmacological::  Warfarin (Coumadin)  DVT prophylaxis - mechanical:  SCDs  Ulcer  Prophylaxis::  No  Antibiotics:  Treating active infection/contamination beyond 24 hours perioperative coverage        Physical Exam     Vitals:    01/26/18 0300 01/26/18 0400 01/26/18 0800 01/26/18 1246   BP:  158/84 146/70 144/89   Pulse: 87 82 82 84   Resp: 18 18 18 18   Temp:  38 °C (100.4 °F) 37.6 °C (99.6 °F) 37.1 °C (98.7 °F)   SpO2: 95% 93% 91% 92%   Weight:       Height:         Body mass index is 33.21 kg/m². Weight: 108 kg (238 lb 1.6 oz)  Oxygen Therapy:  Pulse Oximetry: 92 %, O2 (LPM): 3, O2 Delivery: Silicone Nasal Cannula      Physical Exam   Constitutional: He is oriented to person, place, and time and well-developed, well-nourished, and in no distress.   HENT:   Head: Normocephalic and atraumatic.   Eyes: Conjunctivae are normal. Pupils are equal, round, and reactive to light. No scleral icterus.   Neck: Normal range of motion. Neck supple. No JVD present.   Cardiovascular: Normal rate, regular rhythm and normal heart sounds.    No murmur heard.  Pulmonary/Chest: Effort normal and breath sounds normal. No respiratory distress. He has no wheezes. He has no rales.   Abdominal: Soft. Bowel sounds are normal. He exhibits no distension. There is no tenderness.   Musculoskeletal: Normal range of motion. He exhibits edema (trace).   Lymphadenopathy:     He has no cervical adenopathy.   Neurological: He is alert and oriented to person, place, and time. No cranial nerve deficit.   Skin: No rash noted. No erythema.   Psychiatric: Affect normal.       Lab Data Review:   1/26/2018  1:45 PM    Recent Labs      01/25/18   1950  01/26/18   0442   SODIUM  135  139   POTASSIUM  5.5  5.2   CHLORIDE  109  116*   CO2  18*  15*   BUN  59*  59*   CREATININE  5.66*  5.29*   MAGNESIUM   --   1.8   PHOSPHORUS   --   5.9*   CALCIUM  9.5  8.5       Recent Labs      01/25/18   1950  01/26/18   0442   ALTSGPT  11  13   ASTSGOT  13  18   ALKPHOSPHAT  74  53   TBILIRUBIN  0.3  0.3   GLUCOSE  129*  98       Recent Labs       01/25/18   1950  01/26/18   0442  01/26/18   0853   RBC  3.48*  3.16*   --    HEMOGLOBIN  10.1*  9.3*   --    HEMATOCRIT  31.3*  28.8*   --    PLATELETCT  187  169   --    PROTHROMBTM  30.0*   --   25.9*   INR  2.90*   --   2.41*   IRON   --   36*   --    FERRITIN   --   43.1   --    TOTIRONBC   --   262   --        Recent Labs      01/25/18   1950  01/26/18 0442   WBC  7.8  6.2   NEUTSPOLYS  72.60*  60.80   LYMPHOCYTES  9.50*  18.20*   MONOCYTES  10.50  14.10*   EOSINOPHILS  5.90  5.30   BASOPHILS  0.50  0.80   ASTSGOT  13  18   ALTSGPT  11  13   ALKPHOSPHAT  74  53   TBILIRUBIN  0.3  0.3         Assessment/Plan     * Acute hypoxic respiratory failure - (present on admission)   Assessment & Plan    - secondary to influenza a infection with possible superimposed community acquired pneumonia given bilateral basilar infiltrates on cxr  - no wbc count on admission, afebrile, requiring 2 lpm(baseline ra)    - blood cultures pending  - tamiflu renal dosing for 5 day course  - ceftriaxone/azithromycin for possible cap  - rt protocol, continuous O2, IS        Sepsis (CMS-HCC)- (present on admission)   Assessment & Plan    - 2/4 SIRS, 0/3 qSOFA on admit  - Influenza positive with possible superimposed CAP  - Procalcitonin elevated on admit  - Blood cultures pending  - Hold lasix   - Continue Tamiflu  - Continue Rocephin/Azithromycin        Hypertension- (present on admission)   Assessment & Plan    - normotensive   - previously on losartan but [facility]  - continue metoprolol, amlodipine, hydralazine         ESRD (end stage renal disease) (CMS-HCC)- (present on admission)   Assessment & Plan    - with metabolic acidosis and proteinuria  - cr 5.66/bun 59 on admission, bun has been in 50's per [facility] records  - secondary to uncontrolled hypertension in the setting of diabetes   - no acute indications for dialysis   - pt has appt 1/29 with nephrology for fistula eval to begin dialysis  - continues to have  some urine output   - renal dosing medications  - avoid nephrotoxic agents   - monitor I&O, daily weights  - bladder scan q6h  - hold lasix and metolozone  - continue calcitriol, no bicarb for now  - follow up outpatient with nephrology for vein mapping and avf eval on 1/29        Hematuria- (present on admission)   Assessment & Plan    - has had 2+ hematuria in 10/2017 [facility]  - ct renal negative on admission   - no gross hematuria, h/h stable  - consider urology consult as outpatient        Diabetes (CMS-HCC)- (present on admission)   Assessment & Plan    - checks bg weekly and are normally < 200's  - home regimen: lantus 45u qhs, no oral agents  - does not report low bgs at home   - a1c 11/2017 6.2%  - continue lantus, SSI, hypoglycemia protocol        Anemia- (present on admission)   Assessment & Plan    - likely acd in the setting of esrd  - f/u iron studies, b12, folate        Renal osteodystrophy- (present on admission)   Assessment & Plan    - low vitamin d and elevated pth, phos <5  - continue home meds        Complete heart block (CMS-HCC)- (present on admission)   Assessment & Plan    - h/o chb secondary to likely ischemia vs degenerative changes  - s/p pacemaker in 2007  - echo 11/2016 ef 50-55%, normal left ventricle size and function        Hepatitis C- (present on admission)   Assessment & Plan    - s/p harvoni, hepatitis b negative         H/O aortic valve replacement- (present on admission)   Assessment & Plan    - h/o prosthetic aortic valve replacement in 1993  - goal inr 2-3, inr 2.9 on admission   - home regimen 5 mg sun/tuesday, 2.5 mg all other days  - warfarin per pharmacy         History of mechanical fall without injury- (present on admission)   Assessment & Plan    - s/p carrying case of water and losing balance due to how heavy it was  - ct head negative, no rib fx's  - residual right rib contusion   - tylenol prn for pain        Obstructive sleep apnea- (present on admission)    Assessment & Plan    - compliant w/cpap nightly  - RT for cpap         Hypothyroidism- (present on admission)   Assessment & Plan    - tsh 4.22 on 11/2017  - continue synthroid        Hyperlipidemia- (present on admission)   Assessment & Plan    - continue lovastatin            Ania Roberson M.D.     The patient was seen by me face to face. I personally had a discussion with the patient. The case was discussed with our resident team, I examined the patient and confirmed the essential components of the history, physical examination, diagnosis and treatment plan as needed. I agree with the patient care as documented by the resident and edited as above by me. See resident's note above for complete details of service. The overall treatment regimen will be carried out as described above.     Thank you:  Harry Farrar MD, FACP  [facility]  Pager: Use Tiger Text (use resident info under treatment team for day to day floor issues)  Office: [phone] Ext. [phone]  Fax: [phone] (non PHI only)

## 2018-01-26 NOTE — PROGRESS NOTES
Assumed care of patient after receiving bedside report.  Call light within reach, bed locked and in the lowest position.  White board updated.       0900:  Stopped continuous infusion.  Sat patient up with breakfast tray.  He has no other needs at this time.  Call light within reach.

## 2018-01-26 NOTE — ASSESSMENT & PLAN NOTE
- History of complete heart block secondary s/p pacemaker placement in 2007  - Echocardiogram in 11/2016 showed EF 50-55% with normal left ventricular size and function.  - Repeat ECHO pending

## 2018-01-26 NOTE — ASSESSMENT & PLAN NOTE
Creatinine 5.66 on admission. CK disease stage IV nearing dialysis.    Plan:  -Avoid nephrotoxic medications.  -Continue to monitor ins and outs and daily weights.  -Continue to hold furosemide and metolazone  -Continue calcitriol  - Vein mapping completed on 1/29  - Nephrology on consult, appreciate rec's

## 2018-01-26 NOTE — ASSESSMENT & PLAN NOTE
Resolved.  On admission, 2/4 SIRS, 0/3 qSOFA on admit. Influenza positive with possible superimposed CAP. Procalcitonin elevated on admit. Blood cultures show NGTD    Plan:  -Continue to hold furosemide.  -Continue Tamiflu, ceftriaxone, and doxycycline.

## 2018-01-26 NOTE — ASSESSMENT & PLAN NOTE
History of prosthetic aortic valve replacement in 1993. INR was 2.9 on admission. Home regimen of warfarin 5 mg on Tuesdays and Thursdays and 2.5 mg all other days.    Plan:  -Continue warfarin dosing per pharmacy.

## 2018-01-26 NOTE — PROGRESS NOTES
2 RN skin assessment completed with NANY Broussard. Ears and elbows intact and blanchable, skin tear on right knee from previous fall at home (foam dressing placed), coccyx and heels intact and blanchable. BLE dusky brown.

## 2018-01-26 NOTE — ASSESSMENT & PLAN NOTE
Patient has had 2+ hemat[facility] renal negative on admission.    Plan:   - Consider urology consult as outpatient.

## 2018-01-26 NOTE — SENIOR ADMIT NOTE
Chief complaint: Fever and cough for 2 days    History of present illness: 68[facility]-old [facility] gentleman with a history of insulin-dependent diabetes, chronic kidney disease stage V, hypertension, history of aortic valve replacement on warfarin, pacemaker in situ,  presented with history of fever, chills and fatigue with dry cough for the last 2 days.    He denies any sick contacts, no recent travel , and is updated in vaccination. The wife reports some confusion and alteration in mental status    He reports some chest pain, which is pleuritic in nature on the right side, he attributed this chest pain initially  due to history of fall 2 days ago, but it's has been exacerbated by this chest infection. He [facility] chest [facility] and [facility] and it was normal As he said. The fall seems to be mechanical fall and there is no associated symptoms.    He has end-stage renal disease probably due to his HTN or DM, waiting an appointment for AV fistula to start hemodialysis. He denies any symptoms of nausea vomiting or poor appetite, has good urine output.     Past medical history: Insulin-dependent diabetes, hypertension, history of aortic valve replacement on warfarin, stage V chronic kidney disease, obstructive sleep apnea on CPAP but not on oxygen, single kidney, history of brain aneurysm     Medications: He is on Lantus 45 units subcu at bedtime, amlodipine, aspirin, calcitriol, vitamin D ergocalciferol, hydralazine, Lasix, levothyroxine, metoprolol, metolazone, lovastatin, pregabalin, warfarin, melatonin, sildenafil,    Social and family history: History of smoking in the past, quit in 1990s, occasional alcohol, no illicit drug use.    Admitted to the ED, his temperature was high up to 107, tachycardic up to 114, needed about 4 L of oxygen to maintain saturation, chest x-ray showed possible left basal infiltrate, flu test was positive for influenza A, white BC normal, lactic acid normal.  "Received vancomycin and Zosyn in the ED.      Physical Exam   BP (!) 166/87   Pulse 76   Temp 37.3 °C (99.1 °F)   Resp 18   Ht 1.803 m (5' 11\")   Wt 108 kg (238 lb 1.6 oz)   SpO2 95%   BMI 33.21 kg/m²     Constitutional:   oriented to person, place, and time and well-developed, not in acute distress  Head: Normocephalic and atraumatic.    Mouth/Throat: Oropharynx is clear and moist. No oropharyngeal exudate.   Eyes: Conjunctivae and EOM are normal. Pupils are equal, round, and reactive to light.  No scleral icterus.   Neck: Normal range of motion. Neck supple. No JVD present. No tracheal deviation present. No thyromegaly present.   Cardiovascular: Normal rate, regular rhythm, normal heart sounds and intact distal pulses.  Exam reveals no gallop and no friction rub.    Ejection systolic murmur, faint at the aortic area, no radiation, ejection click and loud S2  Pulmonary/Chest: Effort normal and breath sounds normal. No stridor. No respiratory distress.  No wheezes, coarse basilar crackles bilaterally. No tenderness  Abdominal: Soft, no distension and no mass. There is no tenderness. There is no rebound and no guarding.   Extremities: 1+ leg edema, peripheral pulses are intact  Neurological:  alert and oriented to person, place, and time. Reflexes are normal. No cranial nerve deficit. normal muscle tone and power. GCS score is 15. sensation is intact  Skin: No rash noted. No erythema. No pallor.   Psychiatric: Mood, memory, affect and judgment normal.       Assessment and Plan    Influenza a upper viral respiratory tract infection  Possible community acquired pneumonia  Acute hypoxic respiratory failure     68-year-old male end-stage renal disease, not on hemodialysis yet, presented with fever and cough, for the last 2 days. He was febrile on admission temperature up to 107 F, tachypneic, needed about 4 L of oxygen to maintain saturation. Flu test was positive to axillas A, chest x-ray showed questionable left " basal infiltrate, white BC is normal. This is most likely upper respiratory tract infection with influenza, there could be possible community acquired pneumonia on the top of this viral infection.     - Will start Tamiflu, renal dose  - Cautious IV fluid  -  ceftriaxone and azithromycin IV  - will send for procalcitonin  - Was sent for two-view chest x-ray  - Incentive spirometry  -Respiratory protocol  - Sent for blood culture  - ABG    Chest pain  Related to his trauma, no rib fracture on x-ray  Pain medications      Chronic medical problems  End-stage renal disease  Creatinine 5.66, BUN 59, GFR 10%, close to his baseline  There is no urgent indication for dialysis at this time  Avoid nephrotoxic agents,  Renal dosed  Medications  Follow-up with nephrology  Continue calcitriol    History of mechanical aortic valve replacement on warfarin  INR 2.9, at goal  Continue warfarin per pharmacy     # microscopic hematuria     Seems painless hematuria, CT negative for stone    Needs follow up     Insulin-dependent diabetes  Decrease glargine to 50 units, so a sliding scale, hypoglycemia protocol and Accu-Chek  Obstructive sleep apnea continue CPAP  Hypertension, continue blood pressure pressure medications    DVT prophylaxis: Warfarin  Code status: Full code

## 2018-01-26 NOTE — ASSESSMENT & PLAN NOTE
History of obstructive sleep apnea complying with CPAP at night.    Plan:  -Continue CPAP per RT at night.

## 2018-01-26 NOTE — PROGRESS NOTES
Inpatient Anticoagulation Service Note    Date: 1/26/2018  Reason for Anticoagulation: Bioprosthetic Valve Replacement     68 year old male with a history of CKD stage 5 s/p L nephrectomy, CVA with residual right sided weakness, complete heart block s/p pacemaker in 2007, DM2, HLD, HTN, Hepatitis C s/p harvoni, Hypothyroidism, TARIK w/nightly CPAP and history of aortic/mitral valve replacement on warfarin who presents with generalized weakness.     Pt's home dose of warfarin is 5 mg on Sun & Tue and 2.5 mg on AOD.  According to the admitting med req (verified by med req tech), pt last took warfarin on 1/24/18    Pt's H&H is low, likely due to his CKD, and Platelets are WNL.     Hemoglobin Value: (!) 9.3  Hematocrit Value: (!) 28.8  Lab Platelet Value: 169  Target INR: 2.0 to 3.0    INR from last 7 days     Date/Time INR Value    01/25/18 1950 (!)  2.9        Dose from last 7 days     Date/Time Dose (mg)    01/26/18 0500  2.5        Average Dose (mg): 22.5mg weekly - home dose    Significant Interactions: Antibiotics, Antiplatelet Medications, Statin    Bridge Therapy: No    Reversal Agent Administered: Not Applicable    Plan: Resume home schedule as noted above - INR is therapeutic.  The 2.5 mg Thursday dose was given on 1/26@0425.  Further doses will be based on daily INR and adjusted by the rounding pharmacist as clinically appropriate.    Education Material Provided?: No, current warfarin pt.    Pharmacist suggested discharge dosing: To be determined      Onofre Barajas PharmD

## 2018-01-26 NOTE — ASSESSMENT & PLAN NOTE
Likely anemia of chronic disease secondary to end-stage renal disease.  Ferritin 43.1, iron low at 36, TIBC 262, %saturation 14, vitamin B-12 370, folate> 235.    Plan:  -Consider erythropoietin on outpatient basis.

## 2018-01-26 NOTE — H&P
Internal Medicine Admitting History and Physical    Note Author: Ana Coulter M.D.       Name Efrain Martines       1950   Age/Sex 68 y.o. male   MRN [id number]   Code Status Full Code      After 5PM or if no immediate response to page, please call for cross-coverage  Attending/Team: Dr. Farrar/Michael  See Patient List for primary contact information  Call [phone] to page    1st Call - Day Intern (R1):   Dr. Roberson  2nd Call - Day Sr. Resident (R2/R3):   Dr. Jonas        Chief Complaint:  Generalized weakness     HPI:  Efrain Martines is a 68 year old male with a history of CKD stage 5 s/p L nephrectomy, CVA with residual right sided weakness, complete heart block s/p pacemaker in , DM2, HLD, HTN, Hepatitis C s/p harvoni, Hypothyroidism, TARIK w/nightly CPAP and history of aortic valve replacement on warfarin who presents with generalized weakness.     [facility] a few weeks prior resulting in some right rib bruising. [facility]. [facility], he went home and experienced generalized weakness. He reports fever today, mild non productive cough 2 days prior and continued right chest pain after fall. Denies n/v/d, chills, myalgias, abdominal pain, nasal congestion, sore throat, rhinorrhea, leg swelling, PND or headache. No sick contacts, has been to [place]  through 1/3. Does not use oxygen at home, does use cpap nightly. Last BM was yesterday. Has appt  for vascular evaluation for fistula for dialysis and is established with nephrologist, continues to have some urine output. The patient does not use an assist device to ambulate, but when he does it is mostly between bed and couch. He does not use oxygen during the day.    ED Course:   Satting >90% on 2 lpm nasal cannula, +fever, no wbc count. SIRS +2/4 w/source. Influenza a+, CXR notable for bilateral  opacities in lung bases. Started on ceftriaxone/azithromycin and tamiflu.  Admitted for acute hypoxic respiratory failure secondary to influenza a infection vs possible community acquired pneumonia, ESRD with metabolic acidosis w/o acute indications for dialysis, hematuria, right rib contusion and normocytic anemia.     Review of Systems   Constitutional: Positive for fever and malaise/fatigue. Negative for chills, diaphoresis and weight loss.   HENT: Negative for congestion, ear pain and sore throat.    Eyes: Negative for double vision, photophobia and pain.   Respiratory: Positive for cough. Negative for sputum production, shortness of breath and wheezing.    Cardiovascular: Positive for chest pain. Negative for palpitations, orthopnea, claudication, leg swelling and PND.   Gastrointestinal: Negative for abdominal pain, constipation, diarrhea, nausea and vomiting.   Genitourinary: Negative for dysuria, flank pain, frequency, hematuria and urgency.   Musculoskeletal: Positive for falls and neck pain. Negative for back pain, joint pain and myalgias.   Skin: Negative for itching and rash.   Neurological: Positive for dizziness and weakness. Negative for sensory change, speech change, focal weakness, loss of consciousness and headaches.   Endo/Heme/Allergies: Negative for polydipsia. Does not bruise/bleed easily.   Psychiatric/Behavioral: Negative for depression and suicidal ideas.       Past Medical History:   Past Medical History:   Diagnosis Date   • Cerebral aneurysm 1990's    w/clot into L kidney    • Cervical radiculopathy    • Chronic hepatitis C (CMS-HCC)    • Chronic kidney disease     stage 4   • CVA (cerebral vascular accident) (CMS-[facility]) 1993    residual right sided weakness   • DM (diabetes mellitus) (CMS-[facility])     type 2   • HTN (hypertension)    • Hyperlipidemia    • Hypothyroidism    • Obstructive sleep apnea     cpap nightly    • Pacemaker    • Peripheral neuropathy (CMS-[facility])        Past Surgical  History:  Past Surgical History:   Procedure Laterality Date   • AORTIC VALVE REPLACEMENT     • MITRAL VALVE REPLACEMENT     • NEPHRECTOMY LAPAROSCOPIC Left        Current Outpatient Medications:  Home Medications     Reviewed by Kemi Gifford (Pharmacy Tech) on 01/26/18 at 0031  Med List Status: Complete   Medication Last Dose Status   amlodipine (NORVASC) 10 MG Tab 1/25/2018 Active   artificial tears 1.4 % Solution >week Active   aspirin EC (ECOTRIN) 81 MG Tablet Delayed Response 1/24/2018 Active   calcitRIOL (ROCALTROL) 0.25 MCG Cap 1/25/2018 Active   Coenzyme Q10 (COQ10 PO) 1/25/2018 Active   furosemide (LASIX) 80 MG Tab 1/25/2018 Active   hydrALAZINE (APRESOLINE) 25 MG Tab 1/25/2018 Active   insulin glargine (LANTUS) 100 UNIT/ML Solution 1/24/2018 Active   levothyroxine (SYNTHROID) 125 MCG Tab 1/25/2018 Active   lidocaine (LMX) 4 % Cream <week Active   lovastatin (MEVACOR) 40 MG tablet 1/24/2018 Active   melatonin 3 MG Tab 1/24/2018 Active   metolazone (ZAROXOLYN) 2.5 MG Tab 1/25/2018 Active   metoprolol SR (TOPROL XL) 100 MG TABLET SR 24 HR 1/25/2018 Active   pregabalin (LYRICA) 150 MG Cap 1/24/2018 Active   sildenafil citrate (VIAGRA) 100 MG tablet unknown Active   vitamin D, Ergocalciferol, (DRISDOL) 74972 units Cap capsule 1/22/2018 Active   warfarin (COUMADIN) 5 MG Tab 1/24/2018 Active                Medication Allergy/Sensitivities:  No Known Allergies      Family History:  Family History   Problem Relation Age of Onset   • Family history unknown: Yes       Social History:  Social History     Social History   • Marital status:      Spouse name: N/A   • Number of children: N/A   • Years of education: N/A     Occupational History   • Not on file.     Social History Main Topics   • Smoking status: Former Smoker     Packs/day: 1.00     Years: 20.00     Types: Cigarettes     Quit date: 1993   • Smokeless tobacco: Never Used   • Alcohol use No   • Drug use: No   • Sexual activity: No     Other  "Topics Concern   • Not on file     Social History Narrative    Lives in home in [place].      Living situation: [place] at home -no assistance.   PCP : Dr. Otoole       Physical Exam     Vitals:    01/26/18 0200 01/26/18 0230 01/26/18 0300 01/26/18 0400   BP:    158/84   Pulse: 76 78 87 82   Resp: 18 18 18 18   Temp:    38 °C (100.4 °F)   SpO2: 95% 93% 95% 93%   Weight:       Height:         Body mass index is 33.21 kg/m².  /84   Pulse 82   Temp 38 °C (100.4 °F)   Resp 18   Ht 1.803 m (5' 11\")   Wt 108 kg (238 lb 1.6 oz)   SpO2 93%   BMI 33.21 kg/m²   O2 therapy: Pulse Oximetry: 93 %, O2 (LPM): 3, O2 Delivery: Silicone Nasal Cannula    Physical Exam  General: no acute distress   HEENT: moist mucous membranes, EOMI, PERRL  NECK: no cervical lymphadenopathy, JVD flat  Lungs: bibasilar crackles   Cardiovascular: mild systolic ejection murmur, S2 >S1 with ejection click  Abdomen: soft, non tender, non distended, normoactive bowel sounds   Extremities: 1+ edema bilaterally (R>L), +3/5 strength in b/l lower extremities, +5/5 strength in upper extremities  Skin: no rashes or cyanosis   Neuro: no gross focal deficits   Orientation: alert and oriented to person, place and time  Psch: no si/hi       Data Review       Old Records Request:   Completed  Current Records review and summary: Completed    Lab Data Review:  Recent Results (from the past 24 hour(s))   Lactic acid (lactate)    Collection Time: 01/25/18  7:50 PM   Result Value Ref Range    Lactic Acid 1.2 0.5 - 2.0 mmol/L   CBC WITH DIFFERENTIAL    Collection Time: 01/25/18  7:50 PM   Result Value Ref Range    WBC 7.8 4.8 - 10.8 K/uL    RBC 3.48 (L) 4.70 - 6.10 M/uL    Hemoglobin 10.1 (L) 14.0 - 18.0 g/dL    Hematocrit 31.3 (L) 42.0 - 52.0 %    MCV 89.9 81.4 - 97.8 fL    MCH 29.0 27.0 - 33.0 pg    MCHC 32.3 (L) 33.7 - 35.3 g/dL    RDW 44.2 35.9 - 50.0 fL    Platelet Count 187 164 - 446 K/uL    MPV 11.2 9.0 - 12.9 fL    Neutrophils-Polys 72.60 (H) 44.00 - 72.00 " %    Lymphocytes 9.50 (L) 22.00 - 41.00 %    Monocytes 10.50 0.00 - 13.40 %    Eosinophils 5.90 0.00 - 6.90 %    Basophils 0.50 0.00 - 1.80 %    Immature Granulocytes 1.00 (H) 0.00 - 0.90 %    Nucleated RBC 0.00 /100 WBC    Neutrophils (Absolute) 5.67 1.82 - 7.42 K/uL    Lymphs (Absolute) 0.74 (L) 1.00 - 4.80 K/uL    Monos (Absolute) 0.82 0.00 - 0.85 K/uL    Eos (Absolute) 0.46 0.00 - 0.51 K/uL    Baso (Absolute) 0.04 0.00 - 0.12 K/uL    Immature Granulocytes (abs) 0.08 0.00 - 0.11 K/uL    NRBC (Absolute) 0.00 K/uL   COMP METABOLIC PANEL    Collection Time: 01/25/18  7:50 PM   Result Value Ref Range    Sodium 135 135 - 145 mmol/L    Potassium 5.5 3.6 - 5.5 mmol/L    Chloride 109 96 - 112 mmol/L    Co2 18 (L) 20 - 33 mmol/L    Anion Gap 8.0 0.0 - 11.9    Glucose 129 (H) 65 - 99 mg/dL    Bun 59 (H) 8 - 22 mg/dL    Creatinine 5.66 (HH) 0.50 - 1.40 mg/dL    Calcium 9.5 8.5 - 10.5 mg/dL    AST(SGOT) 13 12 - 45 U/L    ALT(SGPT) 11 2 - 50 U/L    Alkaline Phosphatase 74 30 - 99 U/L    Total Bilirubin 0.3 0.1 - 1.5 mg/dL    Albumin 3.2 3.2 - 4.9 g/dL    Total Protein 7.3 6.0 - 8.2 g/dL    Globulin 4.1 (H) 1.9 - 3.5 g/dL    A-G Ratio 0.8 g/dL   PROTHROMBIN TIME    Collection Time: 01/25/18  7:50 PM   Result Value Ref Range    PT 30.0 (H) 12.0 - 14.6 sec    INR 2.90 (H) 0.87 - 1.13   ESTIMATED GFR    Collection Time: 01/25/18  7:50 PM   Result Value Ref Range    GFR If  12 (A) >60 mL/min/1.73 m 2    GFR If Non African American 10 (A) >60 mL/min/1.73 m 2   INFLUENZA A/B BY PCR    Collection Time: 01/25/18  7:55 PM   Result Value Ref Range    Influenza virus A RNA POSITIVE (A) Negative    Influenza virus B, PCR Negative Negative   URINALYSIS    Collection Time: 01/25/18  8:05 PM   Result Value Ref Range    Color Yellow     Character Clear     Specific Gravity 1.016 <1.035    Ph 6.0 5.0 - 8.0    Glucose 250 (A) Negative mg/dL    Ketones Negative Negative mg/dL    Protein >=1000 (A) Negative mg/dL    Bilirubin  Negative Negative    Urobilinogen, Urine 0.2 Negative    Nitrite Negative Negative    Leukocyte Esterase Negative Negative    Occult Blood Moderate (A) Negative    Micro Urine Req Microscopic    URINE MICROSCOPIC (W/UA)    Collection Time: 01/25/18  8:05 PM   Result Value Ref Range    WBC 0-2 (A) /hpf    RBC  (A) /hpf    Bacteria Negative None /hpf    Epithelial Cells Negative /hpf    Hyaline Cast 0-2 /lpf   Lactic acid (lactate)    Collection Time: 01/25/18 10:30 PM   Result Value Ref Range    Lactic Acid 0.9 0.5 - 2.0 mmol/L   CBC WITH DIFFERENTIAL    Collection Time: 01/26/18  4:42 AM   Result Value Ref Range    WBC 6.2 4.8 - 10.8 K/uL    RBC 3.16 (L) 4.70 - 6.10 M/uL    Hemoglobin 9.3 (L) 14.0 - 18.0 g/dL    Hematocrit 28.8 (L) 42.0 - 52.0 %    MCV 91.1 81.4 - 97.8 fL    MCH 29.4 27.0 - 33.0 pg    MCHC 32.3 (L) 33.7 - 35.3 g/dL    RDW 45.5 35.9 - 50.0 fL    Platelet Count 169 164 - 446 K/uL    MPV 11.9 9.0 - 12.9 fL    Neutrophils-Polys 60.80 44.00 - 72.00 %    Lymphocytes 18.20 (L) 22.00 - 41.00 %    Monocytes 14.10 (H) 0.00 - 13.40 %    Eosinophils 5.30 0.00 - 6.90 %    Basophils 0.80 0.00 - 1.80 %    Immature Granulocytes 0.80 0.00 - 0.90 %    Nucleated RBC 0.00 /100 WBC    Neutrophils (Absolute) 3.75 1.82 - 7.42 K/uL    Lymphs (Absolute) 1.12 1.00 - 4.80 K/uL    Monos (Absolute) 0.87 (H) 0.00 - 0.85 K/uL    Eos (Absolute) 0.33 0.00 - 0.51 K/uL    Baso (Absolute) 0.05 0.00 - 0.12 K/uL    Immature Granulocytes (abs) 0.05 0.00 - 0.11 K/uL    NRBC (Absolute) 0.00 K/uL   COMP METABOLIC PANEL    Collection Time: 01/26/18  4:42 AM   Result Value Ref Range    Sodium 139 135 - 145 mmol/L    Potassium 5.2 3.6 - 5.5 mmol/L    Chloride 116 (H) 96 - 112 mmol/L    Co2 15 (L) 20 - 33 mmol/L    Anion Gap 8.0 0.0 - 11.9    Glucose 98 65 - 99 mg/dL    Bun 59 (H) 8 - 22 mg/dL    Creatinine 5.29 (HH) 0.50 - 1.40 mg/dL    Calcium 8.5 8.5 - 10.5 mg/dL    AST(SGOT) 18 12 - 45 U/L    ALT(SGPT) 13 2 - 50 U/L    Alkaline  Phosphatase 53 30 - 99 U/L    Total Bilirubin 0.3 0.1 - 1.5 mg/dL    Albumin 3.3 3.2 - 4.9 g/dL    Total Protein 6.2 6.0 - 8.2 g/dL    Globulin 2.9 1.9 - 3.5 g/dL    A-G Ratio 1.1 g/dL   MAGNESIUM    Collection Time: 01/26/18  4:42 AM   Result Value Ref Range    Magnesium 1.8 1.5 - 2.5 mg/dL   LIPID PROFILE    Collection Time: 01/26/18  4:42 AM   Result Value Ref Range    Cholesterol,Tot 145 100 - 199 mg/dL    HDL 19 (A) >=40 mg/dL   VITAMIN B12    Collection Time: 01/26/18  4:42 AM   Result Value Ref Range    Vitamin B12 -True Cobalamin 370 211 - 911 pg/mL   FOLATE    Collection Time: 01/26/18  4:42 AM   Result Value Ref Range    Folate -Folic Acid >23.5 >4.0 ng/mL   VITAMIN D,25 HYDROXY    Collection Time: 01/26/18  4:42 AM   Result Value Ref Range    25-Hydroxy   Vitamin D 25 24 (L) 30 - 100 ng/mL   PTH INTACT (PTH ONLY)    Collection Time: 01/26/18  4:42 AM   Result Value Ref Range    Pth, Intact 468.2 (H) 14.0 - 72.0 pg/mL   PHOSPHORUS    Collection Time: 01/26/18  4:42 AM   Result Value Ref Range    Phosphorus 5.9 (H) 2.5 - 4.5 mg/dL   FERRITIN    Collection Time: 01/26/18  4:42 AM   Result Value Ref Range    Ferritin 43.1 22.0 - 322.0 ng/mL   IRON/TOTAL IRON BIND    Collection Time: 01/26/18  4:42 AM   Result Value Ref Range    Iron 36 (L) 50 - 180 ug/dL    Total Iron Binding 262 250 - 450 ug/dL    % Saturation 14 (L) 15 - 55 %   PROCALCITONIN    Collection Time: 01/26/18  4:42 AM   Result Value Ref Range    Procalcitonin 0.47 (H) <0.25 ng/mL   ABG - LAB    Collection Time: 01/26/18  4:42 AM   Result Value Ref Range    Ph 7.35 (L) 7.40 - 7.50    Pco2 26.8 26.0 - 37.0 mmHg    Po2 70.0 64.0 - 87.0 mmHg    O2 Saturation 92.8 (L) 93.0 - 99.0 %    Hco3 15 (L) 17 - 25 mmol/L    Base Excess -10 (L) -4 - 3 mmol/L    Body Temp see below Centigrade    O2 Therapy 3.0 2.0 - 10.0 L/min   ESTIMATED GFR    Collection Time: 01/26/18  4:42 AM   Result Value Ref Range    GFR If  13 (A) >60 mL/min/1.73 m 2     GFR If Non African American 11 (A) >60 mL/min/1.73 m 2       Imaging/Procedures Review:    Independant Imaging Review: Completed     US-RENAL   Final Result         1.  Nonvisualization of the left kidney compatible with history of nephrectomy.   2.  Enlarged prostate      CT-RENAL COLIC EVALUATION(A/P W/O)   Final Result         1.  Hazy groundglass pulmonary opacities in the lung bases suggests mild edema or infiltrate.   2.  Atherosclerosis and atherosclerotic coronary artery disease   3.  Bilateral fat-containing inguinal hernias   4.  Large left lateral abdominal hernia containing small bowel.      CT-HEAD W/O   Final Result         1.  No acute intracranial abnormality is identified, there are changes of small vessel ischemic disease with mild atrophy noted.      DX-CHEST-PORTABLE (1 VIEW)   Final Result         1.  Left lung base atelectasis, cannot exclude early infiltrates.   2.  Atherosclerosis      DX-CHEST-2 VIEWS    (Results Pending)     EKG: Pending.     Records reviewed and summarized in current documentation             Assessment/Plan     * Acute hypoxic respiratory failure - (present on admission)   Assessment & Plan    - secondary to influenza a infection with possible superimposed community acquired pneumonia given bilateral basilar infiltrates on cxr  - no wbc count on admission, afebrile, requiring 2 lpm (baseline ra)   Plan   - tamiflu renal dosing for 5 day course (1/26-current)   - ceftriaxone/azithromycin for possible cap to consider dc if procalcitonin wnl   - continue gentle fluids for now   - supplement o2 to maintain sats   - f/u blood culture 1/26, 2v cxr, abg   - incentive spirometer          Sepsis (CMS-HCC)- (present on admission)   Assessment & Plan    - +2/4 SIRS with fever and tachycardia with influenza/possible superimposed cap   Plan   - continue abx as above  - gentle fluids in the setting of esrd  - hold lasix   - f/u blood ctx, 2v cxr, procalcintonin        Hypertension-  (present on admission)   Assessment & Plan    - normotensive   - previously on losartan but [facility]  - continue metoprolol, amlodipine, hydralazine         ESRD (end stage renal disease) (CMS-HCC)- (present on admission)   Assessment & Plan    - with metabolic acidosis and proteinuria  - cr 5.66/bun 59 on admission, bun has been in 50's per [facility] records  - secondary to uncontrolled hypertension in the setting of diabetes   - no acute indications for dialysis   - pt has appt 1/29 with nephrology for fistula eval to begin dialysis  - continues to have some urine output   - s/p high dose vitamin d replacement   Plan   - renal dosing medications  - avoid nephrotoxic agents   - monitor I&O, daily weights  - bladder scan q6h  - f/u vitamin D, pth, phos for goal <5, renal u/s  - hold lasix and metolozone for fluid overload    - continue calcitriol, no bicarb for now  - follow up outpatient with nephrology for vein mapping and avf eval on 1/29        Hematuria- (present on admission)   Assessment & Plan    - has had 2+ hematuria in 10/2017 [facility]  - ct renal negative on admission   - no gross hematuria, h/h stable  Plan   - follow h/h   - consider repeat ua   - consider urology consult         Diabetes (CMS-HCC)- (present on admission)   Assessment & Plan    - checks bg weekly and are normally < 200's  - home regimen: lantus 45u qhs, no oral agents  - does not report low bgs at home   - a1c 11/2017 6.2%  Plan   - f/u a1c  - lantus 15u qhs, asa, lovastatin  - SSI, hypoglycemia protocol           Anemia- (present on admission)   Assessment & Plan    - likely acd in the setting of esrd  - f/u iron studies, b12, folate        Renal osteodystrophy- (present on admission)   Assessment & Plan    - [facility] low vitamin d and elevated pth, phos <5  - f/u vit d, pth, phos        Obesity- (present on admission)   Assessment & Plan    - w/deconditioning   - consider pt/ot to eval and treat            Complete heart block (CMS-HCC)- (present on admission)   Assessment & Plan    - h/o chb secondary to likely ischemia vs degenerative changes  - s/p pacemaker in 2007  - echo 11/2016 ef 50-55%, normal left ventricle size and function, mild ai from avr          Hepatitis C- (present on admission)   Assessment & Plan    - s/p harvoni, hepatitis b negative         H/O aortic valve replacement- (present on admission)   Assessment & Plan    - h/o prosthetic aortic valve replacement in 1993  - goal inr 2-3, inr 2.9 on admission   - home regimen 5 mg sun/tuesday, 2.5 mg all other days  - warfarin per pharmacy         History of mechanical fall without injury- (present on admission)   Assessment & Plan    - s/p carrying case of water and ?losing balance due to how heavy it was  - ct head negative, no rib fx's  - residual right rib contusion   - tylenol prn for pain   - consider pt/ot to eval and tx for assist device/balance         Obstructive sleep apnea- (present on admission)   Assessment & Plan    - compliant w/cpap nightly  - RT for cpap         Hypothyroidism- (present on admission)   Assessment & Plan    - tsh 4.22 on 11/2017  - continue synthroid  - hold on tsh w/ft4 during illness         Hyperlipidemia- (present on admission)   Assessment & Plan    - continue lovastatin  - f/u lipid panel             Anticipated Hospital stay:  >2 midnights      Quality Measures    Reviewed items::  Labs reviewed, Medications reviewed and Radiology images reviewed  Leach catheter::  No Leach  DVT prophylaxis pharmacological::  Heparin  Ulcer Prophylaxis::  No    Ana Coulter M.D.    The patient was seen by me face to face. I personally had a discussion with the patient. The case was discussed with our resident team, I examined the patient and confirmed the essential components of the history, physical examination, diagnosis and treatment plan as needed. I agree with the patient care as documented by the resident and edited as  above by me. See resident's note above for complete details of service. The overall treatment regimen will be carried out as described above.     Thank you:  Harry Farrar MD, FACP  [facility]  Pager: Use Tiger Text (use resident info under treatment team for day to day floor issues)  Office: [phone] Ext. [phone]  Fax: [phone] (non PHI only)

## 2018-01-27 LAB
ANION GAP SERPL CALC-SCNC: 6 MMOL/L (ref 0–11.9)
BACTERIA UR CULT: NORMAL
BASOPHILS # BLD AUTO: 0.5 % (ref 0–1.8)
BASOPHILS # BLD: 0.03 K/UL (ref 0–0.12)
BUN SERPL-MCNC: 58 MG/DL (ref 8–22)
CALCIUM SERPL-MCNC: 9.3 MG/DL (ref 8.5–10.5)
CHLORIDE SERPL-SCNC: 115 MMOL/L (ref 96–112)
CO2 SERPL-SCNC: 17 MMOL/L (ref 20–33)
CREAT SERPL-MCNC: 5.54 MG/DL (ref 0.5–1.4)
EOSINOPHIL # BLD AUTO: 0.34 K/UL (ref 0–0.51)
EOSINOPHIL NFR BLD: 6.1 % (ref 0–6.9)
ERYTHROCYTE [DISTWIDTH] IN BLOOD BY AUTOMATED COUNT: 45.9 FL (ref 35.9–50)
GLUCOSE BLD-MCNC: 114 MG/DL (ref 65–99)
GLUCOSE BLD-MCNC: 116 MG/DL (ref 65–99)
GLUCOSE BLD-MCNC: 203 MG/DL (ref 65–99)
GLUCOSE BLD-MCNC: 206 MG/DL (ref 65–99)
GLUCOSE SERPL-MCNC: 105 MG/DL (ref 65–99)
HCT VFR BLD AUTO: 30.2 % (ref 42–52)
HGB BLD-MCNC: 9.4 G/DL (ref 14–18)
IMM GRANULOCYTES # BLD AUTO: 0.03 K/UL (ref 0–0.11)
IMM GRANULOCYTES NFR BLD AUTO: 0.5 % (ref 0–0.9)
INR PPP: 2.29 (ref 0.87–1.13)
LYMPHOCYTES # BLD AUTO: 1.38 K/UL (ref 1–4.8)
LYMPHOCYTES NFR BLD: 24.7 % (ref 22–41)
MCH RBC QN AUTO: 28.7 PG (ref 27–33)
MCHC RBC AUTO-ENTMCNC: 31.1 G/DL (ref 33.7–35.3)
MCV RBC AUTO: 92.1 FL (ref 81.4–97.8)
MONOCYTES # BLD AUTO: 0.9 K/UL (ref 0–0.85)
MONOCYTES NFR BLD AUTO: 16.1 % (ref 0–13.4)
NEUTROPHILS # BLD AUTO: 2.9 K/UL (ref 1.82–7.42)
NEUTROPHILS NFR BLD: 52.1 % (ref 44–72)
NRBC # BLD AUTO: 0 K/UL
NRBC BLD-RTO: 0 /100 WBC
PLATELET # BLD AUTO: 172 K/UL (ref 164–446)
PMV BLD AUTO: 12 FL (ref 9–12.9)
POTASSIUM SERPL-SCNC: 4.8 MMOL/L (ref 3.6–5.5)
PROTHROMBIN TIME: 24.9 SEC (ref 12–14.6)
RBC # BLD AUTO: 3.28 M/UL (ref 4.7–6.1)
SIGNIFICANT IND 70042: NORMAL
SITE SITE: NORMAL
SODIUM SERPL-SCNC: 138 MMOL/L (ref 135–145)
SOURCE SOURCE: NORMAL
WBC # BLD AUTO: 5.6 K/UL (ref 4.8–10.8)

## 2018-01-27 PROCEDURE — 85610 PROTHROMBIN TIME: CPT

## 2018-01-27 PROCEDURE — A9270 NON-COVERED ITEM OR SERVICE: HCPCS | Performed by: HOSPITALIST

## 2018-01-27 PROCEDURE — 82962 GLUCOSE BLOOD TEST: CPT | Mod: 91

## 2018-01-27 PROCEDURE — 36415 COLL VENOUS BLD VENIPUNCTURE: CPT

## 2018-01-27 PROCEDURE — 80048 BASIC METABOLIC PNL TOTAL CA: CPT

## 2018-01-27 PROCEDURE — 700101 HCHG RX REV CODE 250: Performed by: STUDENT IN AN ORGANIZED HEALTH CARE EDUCATION/TRAINING PROGRAM

## 2018-01-27 PROCEDURE — 700105 HCHG RX REV CODE 258: Performed by: INTERNAL MEDICINE

## 2018-01-27 PROCEDURE — 770020 HCHG ROOM/CARE - TELE (206)

## 2018-01-27 PROCEDURE — 99233 SBSQ HOSP IP/OBS HIGH 50: CPT | Mod: GC | Performed by: HOSPITALIST

## 2018-01-27 PROCEDURE — 700111 HCHG RX REV CODE 636 W/ 250 OVERRIDE (IP): Performed by: INTERNAL MEDICINE

## 2018-01-27 PROCEDURE — 700111 HCHG RX REV CODE 636 W/ 250 OVERRIDE (IP): Performed by: STUDENT IN AN ORGANIZED HEALTH CARE EDUCATION/TRAINING PROGRAM

## 2018-01-27 PROCEDURE — 700102 HCHG RX REV CODE 250 W/ 637 OVERRIDE(OP): Performed by: STUDENT IN AN ORGANIZED HEALTH CARE EDUCATION/TRAINING PROGRAM

## 2018-01-27 PROCEDURE — A9270 NON-COVERED ITEM OR SERVICE: HCPCS | Performed by: STUDENT IN AN ORGANIZED HEALTH CARE EDUCATION/TRAINING PROGRAM

## 2018-01-27 PROCEDURE — 700102 HCHG RX REV CODE 250 W/ 637 OVERRIDE(OP): Performed by: HOSPITALIST

## 2018-01-27 PROCEDURE — 94660 CPAP INITIATION&MGMT: CPT

## 2018-01-27 PROCEDURE — 85025 COMPLETE CBC W/AUTO DIFF WBC: CPT

## 2018-01-27 RX ADMIN — AZITHROMYCIN FOR INJECTION INJECTION, POWDER, LYOPHILIZED, FOR SOLUTION 500 MG: 500 INJECTION INTRAVENOUS at 09:14

## 2018-01-27 RX ADMIN — PREGABALIN 150 MG: 75 CAPSULE ORAL at 21:08

## 2018-01-27 RX ADMIN — HYDRALAZINE HYDROCHLORIDE 25 MG: 25 TABLET, FILM COATED ORAL at 17:07

## 2018-01-27 RX ADMIN — WARFARIN SODIUM 2.5 MG: 2.5 TABLET ORAL at 17:07

## 2018-01-27 RX ADMIN — INSULIN HUMAN 2 UNITS: 100 INJECTION, SOLUTION PARENTERAL at 21:11

## 2018-01-27 RX ADMIN — CEFTRIAXONE 2 G: 2 INJECTION, POWDER, FOR SOLUTION INTRAMUSCULAR; INTRAVENOUS at 09:14

## 2018-01-27 RX ADMIN — LOVASTATIN 40 MG: 20 TABLET ORAL at 21:06

## 2018-01-27 RX ADMIN — STANDARDIZED SENNA CONCENTRATE AND DOCUSATE SODIUM 2 TABLET: 8.6; 5 TABLET, FILM COATED ORAL at 09:01

## 2018-01-27 RX ADMIN — INSULIN GLARGINE 15 UNITS: 100 INJECTION, SOLUTION SUBCUTANEOUS at 21:11

## 2018-01-27 RX ADMIN — HYDRALAZINE HYDROCHLORIDE 25 MG: 25 TABLET, FILM COATED ORAL at 21:06

## 2018-01-27 RX ADMIN — CALCITRIOL 0.25 MCG: 0.25 CAPSULE, LIQUID FILLED ORAL at 09:01

## 2018-01-27 RX ADMIN — ASPIRIN 81 MG: 81 TABLET, COATED ORAL at 21:07

## 2018-01-27 RX ADMIN — METOPROLOL SUCCINATE 150 MG: 50 TABLET, FILM COATED, EXTENDED RELEASE ORAL at 09:01

## 2018-01-27 RX ADMIN — OSELTAMIVIR PHOSPHATE 30 MG: 30 CAPSULE ORAL at 21:06

## 2018-01-27 RX ADMIN — LEVOTHYROXINE SODIUM 125 MCG: 125 TABLET ORAL at 09:02

## 2018-01-27 RX ADMIN — AMLODIPINE BESYLATE 10 MG: 10 TABLET ORAL at 09:02

## 2018-01-27 RX ADMIN — HYDRALAZINE HYDROCHLORIDE 25 MG: 25 TABLET, FILM COATED ORAL at 09:02

## 2018-01-27 RX ADMIN — ACETAMINOPHEN 650 MG: 325 TABLET, FILM COATED ORAL at 17:12

## 2018-01-27 RX ADMIN — LIDOCAINE 1 APPLICATION: 40 CREAM TOPICAL at 17:07

## 2018-01-27 RX ADMIN — INSULIN HUMAN 2 UNITS: 100 INJECTION, SOLUTION PARENTERAL at 11:55

## 2018-01-27 ASSESSMENT — ENCOUNTER SYMPTOMS
FEVER: 0
COUGH: 0
NAUSEA: 0
DIZZINESS: 0
CHILLS: 0
DEPRESSION: 0
SHORTNESS OF BREATH: 1
ABDOMINAL PAIN: 0
BLURRED VISION: 0
PALPITATIONS: 0
ORTHOPNEA: 0
VOMITING: 0
WEAKNESS: 1
SENSORY CHANGE: 0
WHEEZING: 0
SORE THROAT: 0
DOUBLE VISION: 0

## 2018-01-27 ASSESSMENT — PAIN SCALES - GENERAL
PAINLEVEL_OUTOF10: 0
PAINLEVEL_OUTOF10: 0

## 2018-01-27 NOTE — PROGRESS NOTES
Wife at bedside, pt assisted up to chair for breakfast, denies pain this morning.  Updated on POC for today. Call light In reach, chair alarm on.

## 2018-01-27 NOTE — PROGRESS NOTES
Assumed care, patient resting comfortably in bed. Bed locked and in lowest position. Call bell and personal items in reach. Whiteboard updated.

## 2018-01-27 NOTE — PROGRESS NOTES
Internal Medicine Interval Note  Note Author: Ania Roberson M.D.     Name Efrain Martines 1950   Age/Sex 68 y.o. male   MRN [id number]   Code Status Full Code     After 5PM or if no immediate response to page, please call for cross-coverage  Attending/Team: Dr. Farrar/Michael See Patient List for primary contact information  Call [phone] to page    1st Call - Day Intern (R1):   Dr. Roberson 2nd Call - Day Sr. Resident (R2/R3):   Dr. Vences     Reason for interval visit  (Principal Problem)   Hypoxia      Interval Problem Daily Status Update  (24 hours)   - Patient stable, states he feels better this AM, remains on 2-3L oxygen during day  - CXR shows L lung base atelectasis vs. Infiltrate, Influenza A positive, pro calcitonin elevated on admit  - Patient noted to be retaining urine, refusing bladder scans, encourage urination  - Continue Rocephin, Azithromycin, Tamiflu (renal dosing), hold IVF due to CKD  - PT/OT on consult, encourage ambulation, ambulatory O2 assessment  - Will attempt to facilitate discharge as patient has nephrology apt on       Review of Systems   Constitutional: Negative for chills and fever.   HENT: Negative for congestion and sore throat.    Eyes: Negative for blurred vision and double vision.   Respiratory: Positive for shortness of breath. Negative for cough and wheezing.    Cardiovascular: Negative for chest pain, palpitations and orthopnea.   Gastrointestinal: Negative for abdominal pain, nausea and vomiting.   Genitourinary: Negative for dysuria and urgency.   Musculoskeletal: Negative for joint pain.   Skin: Negative for rash.   Neurological: Positive for weakness. Negative for dizziness and sensory change.   Psychiatric/Behavioral: Negative for depression.       Consultants/Specialty  None    Disposition  Inpatient requiring IV abx, resolution of hypoxia    Quality Measures    Reviewed items::  EKG reviewed, Radiology images reviewed, Labs reviewed and  Medications reviewed  Leach catheter::  No Leach  : No central line.  DVT prophylaxis pharmacological::  Warfarin (Coumadin)  DVT prophylaxis - mechanical:  SCDs  Ulcer Prophylaxis::  No  Antibiotics:  Treating active infection/contamination beyond 24 hours perioperative coverage        Physical Exam     Vitals:    01/26/18 1916 01/26/18 2308 01/27/18 0331 01/27/18 0715   BP: 153/67 (!) 170/72 160/71 145/73   Pulse: 83 83 81 95   Resp: 18 18 16 18   Temp: 37 °C (98.6 °F) 37.1 °C (98.8 °F) 37.2 °C (99 °F) 37.6 °C (99.6 °F)   SpO2: 92% 94% 94% 94%   Weight: 96.8 kg (213 lb 6.5 oz)      Height:         Body mass index is 29.76 kg/m². Weight: 96.8 kg (213 lb 6.5 oz)  Oxygen Therapy:  Pulse Oximetry: 94 %, O2 (LPM): 3.5, O2 Delivery: Silicone Nasal Cannula      Physical Exam   Constitutional: He is oriented to person, place, and time and well-developed, well-nourished, and in no distress.   HENT:   Head: Normocephalic and atraumatic.   Eyes: Conjunctivae are normal. Pupils are equal, round, and reactive to light. No scleral icterus.   Neck: Normal range of motion. Neck supple. No JVD present.   Cardiovascular: Normal rate, regular rhythm and normal heart sounds.    No murmur heard.  Pulmonary/Chest: Effort normal and breath sounds normal. No respiratory distress. He has no wheezes. He has no rales.   Abdominal: Soft. Bowel sounds are normal. He exhibits no distension. There is no tenderness.   Musculoskeletal: Normal range of motion. He exhibits edema (trace).   Lymphadenopathy:     He has no cervical adenopathy.   Neurological: He is alert and oriented to person, place, and time. No cranial nerve deficit.   Skin: No rash noted. No erythema.   Psychiatric: Affect normal.       Lab Data Review:   1/26/2018  1:45 PM    Recent Labs      01/25/18   1950  01/26/18   0442  01/27/18   0248   SODIUM  135  139  138   POTASSIUM  5.5  5.2  4.8   CHLORIDE  109  116*  115*   CO2  18*  15*  17*   BUN  59*  59*  58*   CREATININE  5.66*   5.29*  5.54*   MAGNESIUM   --   1.8   --    PHOSPHORUS   --   5.9*   --    CALCIUM  9.5  8.5  9.3       Recent Labs      01/25/18   1950  01/26/18 0442  01/27/18   0248   ALTSGPT  11  13   --    ASTSGOT  13  18   --    ALKPHOSPHAT  74  53   --    TBILIRUBIN  0.3  0.3   --    GLUCOSE  129*  98  105*       Recent Labs      01/25/18   1950  01/26/18   0442  01/26/18   0853  01/27/18   0248   RBC  3.48*  3.16*   --   3.28*   HEMOGLOBIN  10.1*  9.3*   --   9.4*   HEMATOCRIT  31.3*  28.8*   --   30.2*   PLATELETCT  187  169   --   172   PROTHROMBTM  30.0*   --   25.9*  24.9*   INR  2.90*   --   2.41*  2.29*   IRON   --   36*   --    --    FERRITIN   --   43.1   --    --    TOTIRONBC   --   262   --    --        Recent Labs      01/25/18   1950  01/26/18 0442 01/27/18   0248   WBC  7.8  6.2  5.6   NEUTSPOLYS  72.60*  60.80  52.10   LYMPHOCYTES  9.50*  18.20*  24.70   MONOCYTES  10.50  14.10*  16.10*   EOSINOPHILS  5.90  5.30  6.10   BASOPHILS  0.50  0.80  0.50   ASTSGOT  13  18   --    ALTSGPT  11  13   --    ALKPHOSPHAT  74  53   --    TBILIRUBIN  0.3  0.3   --          Assessment/Plan     * Acute hypoxic respiratory failure - (present on admission)   Assessment & Plan    - secondary to influenza a infection with possible superimposed community acquired pneumonia given bilateral basilar infiltrates on cxr  - no wbc count on admission, afebrile, requiring 2 lpm(baseline ra)    - blood cultures pending  - tamiflu renal dosing for 5 day course  - ceftriaxone/azithromycin for possible cap  - rt protocol, continuous O2, IS  - continue to wean oxygen as tolerated        Sepsis (CMS-HCC)- (present on admission)   Assessment & Plan    - 2/4 SIRS, 0/3 qSOFA on admit  - Influenza positive with possible superimposed CAP  - Procalcitonin elevated on admit  - Blood cultures show NGTD  - Hold lasix   - Continue Tamiflu  - Continue Rocephin/Azithromycin        Hypertension- (present on admission)   Assessment & Plan    -  normotensive   - previously on losartan but [facility]  - continue metoprolol, amlodipine, hydralazine         ESRD (end stage renal disease) (CMS-HCC)- (present on admission)   Assessment & Plan    - with metabolic acidosis and proteinuria  - cr 5.66/bun 59 on admission, bun has been in 50's per [facility]  - secondary to uncontrolled hypertension in the setting of diabetes   - renal dosing medications  - avoid nephrotoxic agents   - monitor I&O, daily weights  - hold lasix and metolozone  - continue calcitriol, no bicarb for now  - follow up outpatient with nephrology for vein mapping and avf eval on 1/29        Hematuria- (present on admission)   Assessment & Plan    - has had 2+ hematuria in 10/2017 [facility]  - ct renal negative on admission   - no gross hematuria, h/h stable  - consider urology consult as outpatient        Diabetes (CMS-HCC)- (present on admission)   Assessment & Plan    - checks bg weekly and are normally < 200's  - home regimen: lantus 45u qhs, no oral agents  - a1c 11/2017 6.2%  - continue lantus, SSI, hypoglycemia protocol        Anemia- (present on admission)   Assessment & Plan    - likely acd in the setting of esrd  - f/u iron studies, b12, folate        Renal osteodystrophy- (present on admission)   Assessment & Plan    - low vitamin d and elevated pth, phos <5  - continue home meds        Complete heart block (CMS-HCC)- (present on admission)   Assessment & Plan    - h/o chb secondary to likely ischemia vs degenerative changes  - s/p pacemaker in 2007  - echo 11/2016 ef 50-55%, normal left ventricle size and function        Hepatitis C- (present on admission)   Assessment & Plan    - s/p harvoni, hepatitis b negative         H/O aortic valve replacement- (present on admission)   Assessment & Plan    - h/o prosthetic aortic valve replacement in 1993  - goal inr 2-3, inr 2.9 on admission   - home regimen 5 mg sun/tuesday, 2.5 mg all other days  - warfarin per  pharmacy         History of mechanical fall without injury- (present on admission)   Assessment & Plan    - s/p carrying case of water and losing balance due to how heavy it was  - ct head negative, no rib fx's  - residual right rib contusion   - tylenol prn for pain        Obstructive sleep apnea- (present on admission)   Assessment & Plan    - compliant w/cpap nightly  - RT for cpap         Hypothyroidism- (present on admission)   Assessment & Plan    - tsh 4.22 on 11/2017  - continue synthroid        Hyperlipidemia- (present on admission)   Assessment & Plan    - continue lovastatin            Ania Roberson M.D.     The patient was seen by me face to face. I personally had a discussion with the patient. The case was discussed with our resident team, I examined the patient and confirmed the essential components of the history, physical examination, diagnosis and treatment plan as needed. I agree with the patient care as documented by the resident and edited as above by me. See resident's note above for complete details of service. The overall treatment regimen will be carried out as described above.     Thank you:  Harry Farrar MD, FACP  [facility]  Pager: Use [employer] Text (use resident info under treatment team for day to day floor issues)  Office: [phone] Ext. [phone]  Fax: [phone] (non [facility] only)

## 2018-01-27 NOTE — PROGRESS NOTES
"Patient states, \"don't send that hal back to do a bladder scan because you aren't putting a catheter in me. I can pee just fine.\" Patient did empty his bladder shortly after scan.  "

## 2018-01-27 NOTE — PROGRESS NOTES
RA sat at rest 90-94%, with ambulation sat down to 77%, on 1L o2 up to 91%. Pt had difficulty getting up from chair and was a little unsteady at first, after walking a while he became more steady.  Will call PT/OT to see pt before DC.

## 2018-01-27 NOTE — DISCHARGE SUMMARY
Internal Medicine Discharge Summary  Note Author: Ania Roberson M.D.       Admit Date:  1/25/2018       Discharge Date:   1/31/2018    Service:   R Internal Medicine Red Team  Attending Physician(s):   Dr. Minor       Senior Resident(s):   Dr. Trinidad  Durga Resident(s):   Dr. Roberson      Primary Diagnosis:   Acute hypoxic respiratory failure secondary to Influenza A and superimposed pneumonia  Acute exacerbation of HFrEF      Secondary Diagnoses:                Principal Problem (Resolved):    Acute hypoxic respiratory failure  POA: Yes  Active Problems:    ESRD (end stage renal disease) (CMS-HCC) POA: Yes    Hypertension (Chronic) POA: Yes    Diabetes (CMS-HCC) POA: Yes    Hematuria POA: Yes    Acute on chronic systolic heart failure (CMS-HCC) POA: Yes    Hyperlipidemia (Chronic) POA: Yes    Hypothyroidism (Chronic) POA: Yes    Obstructive sleep apnea (Chronic) POA: Yes    History of mechanical fall without injury POA: Yes    H/O aortic valve replacement POA: Yes    Hepatitis C (Chronic) POA: Yes    Complete heart block (CMS-HCC) POA: Yes    Obesity POA: Yes    Renal osteodystrophy POA: Yes    Anemia POA: Yes  Resolved Problems:    Sepsis (CMS-[facility]) POA: Yes    Generalized weakness POA: Unknown    Influenza POA: Unknown      Hospital Summary (Brief Narrative):       Mr. Martines is a 68 year old male with a history of CKD stage 5 s/p L nephrectomy, CVA with residual right sided weakness, complete heart block s/p pacemaker in 2007, DM2, HLD, HTN, Hepatitis C s/p harvoni, Hypothyroidism, TARIK w/nightly CPAP and history of aortic valve replacement on warfarin who presented with generalized weakness.      In the ED, patient was noted to be SIRS 2/4 and saturating ~92% on 2L. CXR showed bilateral opacities in lung bases and patient was found to be influenza positive. Procalcitonin was also elevated. Patient was started on Tamiflu (renal dosing), Rocephin, azithromycin. Patient was given gentle IVF and  his symptoms resolved.     ECHO was completed and he was shown to have an LVEF of 45% with a hypokinetic apex. Patient completed a 5 day course of antibiotics and tamiflu and his symptoms improved. [facility]ph[facility] was consulted and patient underwent vein mapping of his upper extremities. It was determined that there was no acute indication for dialysis and the patient requires outpatient follow up. Patient was discharged in stable condition with a reduction in his lasix dose to 40mg daily and started on sodium bicarbonate 650mg TID. Ambulatory O2 assessment showed significant hypoxia to 69% on RA and he was discharged on home oxygen.       Patient /Hospital Summary (Details -- Problem Oriented) :          ESRD (end stage renal disease) (CMS-HCC)   Assessment & Plan    Creatinine 5.66 on admission. CK disease stage IV nearing dialysis.    Plan:  -Avoid nephrotoxic medications.  -Continue calcitriol  - Vein mapping completed on 1/29  - Cr improving, 4.1 on discharge  - Requires follow up with nephrology as outpatient        Sepsis (CMS-HCC)-resolved as of 1/31/2018   Assessment & Plan    Resolved.  On admission, 2/4 SIRS, 0/3 qSOFA on admit. Influenza positive with possible superimposed CAP. Procalcitonin elevated on admit. Blood cultures show NGTD    -Completed course of Rocephin, Doxy, Tamiflu  -Resolved        * Acute hypoxic respiratory failure -resolved as of 1/31/2018   Assessment & Plan    Likely secondary to influenza A infection with possible superimposed community acquired pneumonia. Chest x-ray shows bibasilar infiltrates. However, white count was normal on admission and patient was afebrile. Blood cultures were negative. Pro-calcitonin was 0.26, elevated.    - blood cultures show NGTD  - continue to wean oxygen as tolerated  - ECHO shows LVEF of 45%, hypokinetic apex  - ABG shows pH of 7.36, pCO2 of 26, HCO3 of 15, no evidence of chronic hypercapnia however possibly compensated due to chronically  elevated BUN due to kidney failure  - completed antibiotic course  - discharged on home oxygen        Acute on chronic systolic heart failure (CMS-HCC)   Assessment & Plan    - History of CAD, CABG, AVR  - ECHO on 1/30 shows LVEF of 45%, hypokinetic apex  - Remains hypoxic during ambulation, possible fluid overload vs. Resolving PNA  - Continue BB, ASA, hold ACE/ARB due to transient hyperK  - discharged on home oxygen        Hematuria   Assessment & Plan    Patient has [facility] 2+ [facility] renal negative on admission.    - requires urology consult as outpatient.        Diabetes (CMS-HCC)   Assessment & Plan    Patient checks blood glucose weekly and are normally < 200's. Home regimen includes Lantus 45 units at bedtime. Hemoglobin A1c was 6.2 on November 2017 and 6.6 on January 26, 2018.    - Continue home medications        Hypertension   Assessment & Plan    -170s/ 60-80s. Was previously on losartan but [facility].    Plan:   -Continue home amlodipine 10 mg  -Continue home hydralazine 25 mg   -Continue home metoprolol  mg per mouth daily        Anemia   Assessment & Plan    Likely anemia of chronic disease secondary to end-stage renal disease.  Ferritin 43.1, iron low at 36, TIBC 262, %saturation 14, vitamin B-12 370, folate> 235.    Plan:  -Consider erythropoietin on outpatient basis.        Renal osteodystrophy   Assessment & Plan    Low vitamin D level and elevated PTH with phosphate less than 10. Likely due to renal disease.    Plan:  -Continue home calcitriol 0.25 µg daily        Complete heart block (CMS-HCC)   Assessment & Plan    - History of complete heart block secondary s/p pacemaker placement in 2007  - Echocardiogram in 11/2016 showed EF 50-55% with normal left ventricular size and function.  - Repeat ECHO pending        Hepatitis C   Assessment & Plan    History of hepatitis B infection status post her bony treatment. Hepatitis B negative.          H/O aortic valve replacement   Assessment & Plan    History of prosthetic aortic valve replacement in 1993. INR was 2.9 on admission. Home regimen of warfarin 5 mg on Tuesdays and Thursdays and 2.5 mg all other days.    Plan:  -Continue home dosing of warfarin        History of mechanical fall without injury   Assessment & Plan    Status post carrying case of water and losing balance due to how heavy it was. Head CT was negative. No rib fractures on chest x-ray. 0 right rib contusion.    Plan:  -Continue Tylenol as needed for pain.        Obstructive sleep apnea   Assessment & Plan    History of obstructive sleep apnea complying with CPAP at night.    Plan:  -Continue CPAP per RT at night.        Hypothyroidism   Assessment & Plan    TSH 4.22 on 11/2017.    Plan:  -Continue home levothyroxine 1.5 µg mouth daily.        Hyperlipidemia   Assessment & Plan    Total cholesterol 145, HDL 19, LDL 85. ASCVD risk 29.7% in 10 years.    Plan:  -Continue home lovastatin 40 mg by mouth daily.            Consultants:     None    Procedures:        None    Imaging/ Testing:      DX-CHEST-PORTABLE (1 VIEW)   Final Result         Worsening interstitial prominence and patchy opacities throughout both lungs, in keeping with worsening pulmonary edema.      ECHOCARDIOGRAM COMP W/O CONT   Final Result      UE VEIN MAPPING         DX-CHEST-PORTABLE (1 VIEW)   Final Result      1.  Stable interstitial edema and bibasilar atelectasis.      2.  Stable cardiomegaly.      US-RENAL   Final Result         1.  Nonvisualization of the left kidney compatible with history of nephrectomy.   2.  Enlarged prostate      CT-RENAL COLIC EVALUATION(A/P W/O)   Final Result         1.  Hazy groundglass pulmonary opacities in the lung bases suggests mild edema or infiltrate.   2.  Atherosclerosis and atherosclerotic coronary artery disease   3.  Bilateral fat-containing inguinal hernias   4.  Large left lateral abdominal hernia containing small  bowel.      CT-HEAD W/O   Final Result         1.  No acute intracranial abnormality is identified, there are changes of small vessel ischemic disease with mild atrophy noted.      DX-CHEST-PORTABLE (1 VIEW)   Final Result         1.  Left lung base atelectasis, cannot exclude early infiltrates.   2.  Atherosclerosis            Discharge Medications:         Medication Reconciliation: Completed       Medication List      START taking these medications      Instructions   sodium bicarbonate 650 MG Tabs  Commonly known as:  SODIUM BICARBONATE   Take 1 Tab by mouth 3 times a day.  Dose:  650 mg        CHANGE how you take these medications      Instructions   furosemide 40 MG Tabs  What changed:  · medication strength  · how much to take  Commonly known as:  LASIX   Take 1 Tab by mouth every morning.  Dose:  40 mg        CONTINUE taking these medications      Instructions   amLODIPine 10 MG Tabs  Commonly known as:  NORVASC   Take 10 mg by mouth every morning.  Dose:  10 mg     artificial tears 1.4 % Soln   Place 1 Drop in both eyes as needed.  Dose:  1 Drop     aspirin EC 81 MG Tbec  Commonly known as:  ECOTRIN   Take 81 mg by mouth every evening.  Dose:  81 mg     calcitRIOL 0.25 MCG Caps  Commonly known as:  ROCALTROL   Take 0.25 mcg by mouth every morning.  Dose:  0.25 mcg     COQ10 PO   Take 1 Tab by mouth every morning.  Dose:  1 Tab     hydrALAZINE 25 MG Tabs  Commonly known as:  APRESOLINE   Take 25 mg by mouth 3 times a day.  Dose:  25 mg     insulin glargine 100 UNIT/ML Soln  Commonly known as:  LANTUS   Inject 45 Units as instructed every evening.  Dose:  45 Units     lidocaine 4 % Crea  Commonly known as:  LMX   Apply 1 Application to affected area(s) as needed (pain).  Dose:  1 Application     lovastatin 40 MG tablet  Commonly known as:  MEVACOR   Take 40 mg by mouth every evening.  Dose:  40 mg     melatonin 3 MG Tabs   Take 9 mg by mouth every bedtime.  Dose:  9 mg     metoprolol  MG Tb24  Commonly  known as:  TOPROL XL   Take 150 mg by mouth every morning.  Dose:  150 mg     pregabalin 150 MG Caps  Commonly known as:  LYRICA   Take 150 mg by mouth every evening.  Dose:  150 mg     sildenafil citrate 100 MG tablet  Commonly known as:  VIAGRA   Take 50 mg by mouth as needed for Erectile Dysfunction.  Dose:  50 mg     SYNTHROID 125 MCG Tabs  Generic drug:  levothyroxine   Take 125 mcg by mouth every morning.  Dose:  125 mcg     vitamin D (Ergocalciferol) 99589 units Caps capsule  Commonly known as:  DRISDOL   Take 50,000 Units by mouth every Monday.  Dose:  90903 Units     warfarin 5 MG Tabs  Commonly known as:  COUMADIN   Take 2.5-5 mg by mouth every evening. 5mg on Sundays/Tuesdays, 2.5mg on all other days  Dose:  2.5-5 mg        STOP taking these medications    metOLazone 2.5 MG Tabs  Commonly known as:  ZAROXOLYN            Disposition:   Stable    Diet:   Renal, Cardiac    Activity:   As tolerated    Instructions:         The patient was instructed to return to the ER in the event of worsening symptoms. I have counseled the patient on the importance of compliance and the patient has agreed to proceed with all medical recommendations and follow up plan indicated above.   The patient understands that all medications come with benefits and risks. Risks may include permanent injury or death and these risks can be minimized with close reassessment and monitoring.        Primary Care Provider:    No primary care provider on file. [facility]      Follow up appointment details :    [facility] in [facility]-2 weeks       Please follow up with Nephrologist at scheduled appointment    Pending Studies:      None      Discharge Time (Minutes) :    Greater than 45 minutes spent on discharge day patient visit, preparing discharge paperwork and arranging for patient follow up.    Hospital Course Type:  Inpatient Stay >2 midnights      Condition on Discharge     ______________________________________________________________________    Interval history/exam for day of discharge:    Patient stable, no complaints, able to ambulate with minimal assistance, eager to be discharged    Vitals:    01/31/18 0314 01/31/18 0820 01/31/18 1138 01/31/18 1516   BP: 141/75 140/63 146/65 149/77   Pulse: 60 79 72 61   Resp: 18 20 19 19   Temp: 36.9 °C (98.4 °F) 36.3 °C (97.4 °F) 36.6 °C (97.9 °F) 36.7 °C (98 °F)   SpO2: 97% 95% 97% 96%   Weight:       Height:         Weight/BMI: Body mass index is 30.23 kg/m².  Pulse Oximetry: 96 %, O2 (LPM): 0, O2 Delivery: None (Room Air)    Physical Exam   Constitutional: He is oriented to person, place, and time and well-developed, well-nourished, and in no distress.   HENT:   Head: Normocephalic and atraumatic.   Eyes: Conjunctivae are normal. Pupils are equal, round, and reactive to light. No scleral icterus.   Neck: Normal range of motion. Neck supple. No JVD present.   Cardiovascular: Normal rate, regular rhythm and normal heart sounds.    No murmur heard.  Pulmonary/Chest: Effort normal and breath sounds normal. No respiratory distress. He has no wheezes. He has no rales.   Abdominal: Soft. Bowel sounds are normal. He exhibits no distension. There is no tenderness.   Musculoskeletal: Normal range of motion. He exhibits no trace edema.  Lymphadenopathy:     He has no cervical adenopathy.   Neurological: He is alert and oriented to person, place, and time. No cranial nerve deficit.   Skin: No rash noted. No erythema.   Psychiatric: Affect normal.       Most Recent Labs:    Lab Results   Component Value Date/Time    WBC 6.7 01/30/2018 03:14 AM    RBC 3.24 (L) 01/30/2018 03:14 AM    HEMOGLOBIN 9.5 (L) 01/30/2018 03:14 AM    HEMATOCRIT 29.5 (L) 01/30/2018 03:14 AM    MCV 91.0 01/30/2018 03:14 AM    MCH 29.3 01/30/2018 03:14 AM    MCHC 32.2 (L) 01/30/2018 03:14 AM    MPV 11.8 01/30/2018 03:14 AM    NEUTSPOLYS 73.20 (H) 01/30/2018 03:14 AM    LYMPHOCYTES  17.90 (L) 01/30/2018 03:14 AM    MONOCYTES 2.70 01/30/2018 03:14 AM    EOSINOPHILS 6.20 01/30/2018 03:14 AM    BASOPHILS 0.00 01/30/2018 03:14 AM      Lab Results   Component Value Date/Time    SODIUM 143 01/31/2018 03:11 AM    POTASSIUM 5.1 01/31/2018 03:11 AM    CHLORIDE 120 (H) 01/31/2018 03:11 AM    CO2 15 (L) 01/31/2018 03:11 AM    GLUCOSE 93 01/31/2018 03:11 AM    BUN 45 (H) 01/31/2018 03:11 AM    CREATININE 4.14 (H) 01/31/2018 03:11 AM      Lab Results   Component Value Date/Time    ALTSGPT 13 01/26/2018 04:42 AM    ASTSGOT 18 01/26/2018 04:42 AM    ALKPHOSPHAT 53 01/26/2018 04:42 AM    TBILIRUBIN 0.3 01/26/2018 04:42 AM    ALBUMIN 3.3 01/26/2018 04:42 AM    GLOBULIN 2.9 01/26/2018 04:42 AM    INR 2.54 (H) 01/31/2018 03:11 AM     Lab Results   Component Value Date/Time    PROTHROMBTM 27.0 (H) 01/31/2018 03:11 AM    INR 2.54 (H) 01/31/2018 03:11 AM

## 2018-01-28 LAB
ANION GAP SERPL CALC-SCNC: 9 MMOL/L (ref 0–11.9)
BASOPHILS # BLD AUTO: 0.3 % (ref 0–1.8)
BASOPHILS # BLD: 0.02 K/UL (ref 0–0.12)
BUN SERPL-MCNC: 60 MG/DL (ref 8–22)
CALCIUM SERPL-MCNC: 9.6 MG/DL (ref 8.5–10.5)
CHLORIDE SERPL-SCNC: 115 MMOL/L (ref 96–112)
CO2 SERPL-SCNC: 17 MMOL/L (ref 20–33)
CREAT SERPL-MCNC: 5.5 MG/DL (ref 0.5–1.4)
EOSINOPHIL # BLD AUTO: 0.53 K/UL (ref 0–0.51)
EOSINOPHIL NFR BLD: 7.5 % (ref 0–6.9)
ERYTHROCYTE [DISTWIDTH] IN BLOOD BY AUTOMATED COUNT: 47.2 FL (ref 35.9–50)
GLUCOSE BLD-MCNC: 110 MG/DL (ref 65–99)
GLUCOSE BLD-MCNC: 116 MG/DL (ref 65–99)
GLUCOSE BLD-MCNC: 218 MG/DL (ref 65–99)
GLUCOSE BLD-MCNC: 219 MG/DL (ref 65–99)
GLUCOSE SERPL-MCNC: 125 MG/DL (ref 65–99)
HCT VFR BLD AUTO: 33.8 % (ref 42–52)
HGB BLD-MCNC: 10.3 G/DL (ref 14–18)
IMM GRANULOCYTES # BLD AUTO: 0.03 K/UL (ref 0–0.11)
IMM GRANULOCYTES NFR BLD AUTO: 0.4 % (ref 0–0.9)
INR PPP: 2.03 (ref 0.87–1.13)
LYMPHOCYTES # BLD AUTO: 1.61 K/UL (ref 1–4.8)
LYMPHOCYTES NFR BLD: 22.9 % (ref 22–41)
MAGNESIUM SERPL-MCNC: 1.9 MG/DL (ref 1.5–2.5)
MCH RBC QN AUTO: 28.3 PG (ref 27–33)
MCHC RBC AUTO-ENTMCNC: 30.5 G/DL (ref 33.7–35.3)
MCV RBC AUTO: 92.9 FL (ref 81.4–97.8)
MONOCYTES # BLD AUTO: 0.67 K/UL (ref 0–0.85)
MONOCYTES NFR BLD AUTO: 9.5 % (ref 0–13.4)
NEUTROPHILS # BLD AUTO: 4.18 K/UL (ref 1.82–7.42)
NEUTROPHILS NFR BLD: 59.4 % (ref 44–72)
NRBC # BLD AUTO: 0 K/UL
NRBC BLD-RTO: 0 /100 WBC
PLATELET # BLD AUTO: 204 K/UL (ref 164–446)
PMV BLD AUTO: 12.1 FL (ref 9–12.9)
POTASSIUM SERPL-SCNC: 5.2 MMOL/L (ref 3.6–5.5)
PROTHROMBIN TIME: 22.6 SEC (ref 12–14.6)
RBC # BLD AUTO: 3.64 M/UL (ref 4.7–6.1)
SODIUM SERPL-SCNC: 141 MMOL/L (ref 135–145)
WBC # BLD AUTO: 7 K/UL (ref 4.8–10.8)

## 2018-01-28 PROCEDURE — 85610 PROTHROMBIN TIME: CPT

## 2018-01-28 PROCEDURE — 36415 COLL VENOUS BLD VENIPUNCTURE: CPT

## 2018-01-28 PROCEDURE — 700102 HCHG RX REV CODE 250 W/ 637 OVERRIDE(OP): Performed by: HOSPITALIST

## 2018-01-28 PROCEDURE — A9270 NON-COVERED ITEM OR SERVICE: HCPCS | Performed by: HOSPITALIST

## 2018-01-28 PROCEDURE — A9270 NON-COVERED ITEM OR SERVICE: HCPCS | Performed by: STUDENT IN AN ORGANIZED HEALTH CARE EDUCATION/TRAINING PROGRAM

## 2018-01-28 PROCEDURE — 770020 HCHG ROOM/CARE - TELE (206)

## 2018-01-28 PROCEDURE — 94760 N-INVAS EAR/PLS OXIMETRY 1: CPT

## 2018-01-28 PROCEDURE — 82962 GLUCOSE BLOOD TEST: CPT | Mod: 91

## 2018-01-28 PROCEDURE — 700102 HCHG RX REV CODE 250 W/ 637 OVERRIDE(OP): Performed by: STUDENT IN AN ORGANIZED HEALTH CARE EDUCATION/TRAINING PROGRAM

## 2018-01-28 PROCEDURE — 80048 BASIC METABOLIC PNL TOTAL CA: CPT

## 2018-01-28 PROCEDURE — 700111 HCHG RX REV CODE 636 W/ 250 OVERRIDE (IP): Performed by: STUDENT IN AN ORGANIZED HEALTH CARE EDUCATION/TRAINING PROGRAM

## 2018-01-28 PROCEDURE — 94660 CPAP INITIATION&MGMT: CPT

## 2018-01-28 PROCEDURE — 85025 COMPLETE CBC W/AUTO DIFF WBC: CPT

## 2018-01-28 PROCEDURE — 99233 SBSQ HOSP IP/OBS HIGH 50: CPT | Mod: GC | Performed by: HOSPITALIST

## 2018-01-28 PROCEDURE — 83735 ASSAY OF MAGNESIUM: CPT

## 2018-01-28 RX ORDER — DOXYCYCLINE 100 MG/1
100 TABLET ORAL EVERY 12 HOURS
Status: COMPLETED | OUTPATIENT
Start: 2018-01-28 | End: 2018-01-30

## 2018-01-28 RX ORDER — IPRATROPIUM BROMIDE AND ALBUTEROL SULFATE 2.5; .5 MG/3ML; MG/3ML
3 SOLUTION RESPIRATORY (INHALATION)
Status: ACTIVE | OUTPATIENT
Start: 2018-01-28 | End: 2018-01-29

## 2018-01-28 RX ORDER — POTASSIUM CHLORIDE 20 MEQ/1
40 TABLET, EXTENDED RELEASE ORAL DAILY
Status: DISCONTINUED | OUTPATIENT
Start: 2018-01-28 | End: 2018-01-30

## 2018-01-28 RX ADMIN — DOXYCYCLINE 100 MG: 100 TABLET ORAL at 21:42

## 2018-01-28 RX ADMIN — WARFARIN SODIUM 5 MG: 5 TABLET ORAL at 16:59

## 2018-01-28 RX ADMIN — OSELTAMIVIR PHOSPHATE 30 MG: 30 CAPSULE ORAL at 21:42

## 2018-01-28 RX ADMIN — STANDARDIZED SENNA CONCENTRATE AND DOCUSATE SODIUM 2 TABLET: 8.6; 5 TABLET, FILM COATED ORAL at 08:51

## 2018-01-28 RX ADMIN — CEFTRIAXONE 2 G: 2 INJECTION, POWDER, FOR SOLUTION INTRAMUSCULAR; INTRAVENOUS at 08:52

## 2018-01-28 RX ADMIN — LEVOTHYROXINE SODIUM 125 MCG: 125 TABLET ORAL at 08:52

## 2018-01-28 RX ADMIN — ASPIRIN 81 MG: 81 TABLET, COATED ORAL at 21:42

## 2018-01-28 RX ADMIN — CALCITRIOL 0.25 MCG: 0.25 CAPSULE, LIQUID FILLED ORAL at 08:51

## 2018-01-28 RX ADMIN — INSULIN HUMAN 2 UNITS: 100 INJECTION, SOLUTION PARENTERAL at 10:43

## 2018-01-28 RX ADMIN — HYDRALAZINE HYDROCHLORIDE 25 MG: 25 TABLET, FILM COATED ORAL at 08:51

## 2018-01-28 RX ADMIN — DOXYCYCLINE 100 MG: 100 TABLET ORAL at 09:00

## 2018-01-28 RX ADMIN — HYDRALAZINE HYDROCHLORIDE 25 MG: 25 TABLET, FILM COATED ORAL at 21:42

## 2018-01-28 RX ADMIN — INSULIN GLARGINE 15 UNITS: 100 INJECTION, SOLUTION SUBCUTANEOUS at 21:43

## 2018-01-28 RX ADMIN — PREGABALIN 150 MG: 75 CAPSULE ORAL at 21:00

## 2018-01-28 RX ADMIN — LOVASTATIN 40 MG: 20 TABLET ORAL at 21:42

## 2018-01-28 RX ADMIN — POTASSIUM CHLORIDE 40 MEQ: 1500 TABLET, EXTENDED RELEASE ORAL at 08:51

## 2018-01-28 RX ADMIN — INSULIN HUMAN 2 UNITS: 100 INJECTION, SOLUTION PARENTERAL at 17:02

## 2018-01-28 RX ADMIN — METOPROLOL SUCCINATE 150 MG: 50 TABLET, FILM COATED, EXTENDED RELEASE ORAL at 08:52

## 2018-01-28 RX ADMIN — AMLODIPINE BESYLATE 10 MG: 10 TABLET ORAL at 08:51

## 2018-01-28 RX ADMIN — HYDRALAZINE HYDROCHLORIDE 25 MG: 25 TABLET, FILM COATED ORAL at 14:18

## 2018-01-28 ASSESSMENT — ENCOUNTER SYMPTOMS
SHORTNESS OF BREATH: 1
HEADACHES: 0
SPUTUM PRODUCTION: 0
NERVOUS/ANXIOUS: 0
COUGH: 1
BLOOD IN STOOL: 0
CONSTIPATION: 0
CHILLS: 0
MYALGIAS: 0
VOMITING: 0
DIZZINESS: 0
NAUSEA: 0
PALPITATIONS: 0
FEVER: 0
BLURRED VISION: 0
DEPRESSION: 0
DIARRHEA: 0
SENSORY CHANGE: 0
SORE THROAT: 0
ABDOMINAL PAIN: 0
DOUBLE VISION: 0

## 2018-01-28 ASSESSMENT — PAIN SCALES - GENERAL
PAINLEVEL_OUTOF10: 0
PAINLEVEL_OUTOF10: 0

## 2018-01-28 ASSESSMENT — COPD QUESTIONNAIRES
DO YOU EVER COUGH UP ANY MUCUS OR PHLEGM?: NO/ONLY WITH OCCASIONAL COLDS OR INFECTIONS
DURING THE PAST 4 WEEKS HOW MUCH DID YOU FEEL SHORT OF BREATH: SOME OF THE TIME
HAVE YOU SMOKED AT LEAST 100 CIGARETTES IN YOUR ENTIRE LIFE: YES
COPD SCREENING SCORE: 6

## 2018-01-28 ASSESSMENT — LIFESTYLE VARIABLES: EVER_SMOKED: YES

## 2018-01-28 NOTE — PROGRESS NOTES
Inpatient Anticoagulation Service Note    Date: 1/28/2018  Reason for Anticoagulation: Bioprosthetic Valve Replacement (Aortic valve replacement)        Hemoglobin Value: (!) 10.3  Hematocrit Value: (!) 33.8  Lab Platelet Value: 204  Target INR: 2.0 to 3.0    INR from last 7 days     Date/Time INR Value    01/28/18 0416 (!)  2.03    01/27/18 0248 (!)  2.29    01/26/18 0853 (!)  2.41    01/25/18 1950 (!)  2.9        Dose from last 7 days     Date/Time Dose (mg)    01/28/18 1100  5    01/27/18 1600  2.5    01/26/18 1400  2.5    01/26/18 0500  2.5        Average Dose (mg):  (Home dose: 5mg Sun/Tues and 2.5mg AOD per medrec)  Significant Interactions: Antibiotics, Statin, Thyroid Medications, Aspirin  Bridge Therapy: No     Reversal Agent Administered: Not Applicable  Comments: INR therapeutic and trending down. Continue home dose - higher dose today. H/H appears stable with no indication of bleeding. No new DDI.     Plan:  Warfarin 5mg with INR check tomorrow  Education Material Provided?: No (Chronic tx)  Pharmacist suggested discharge dosing: Warfarin 5mg PO every Sun/Tues and 2.5mg on all other days with close f/u within 3 days       Mamie Phillips, PharmD., BCPS

## 2018-01-28 NOTE — PROGRESS NOTES
Patient feeling sort of breath. O2 saturation 90-94 on room air, but patient unable to complete full sentences. Patient has expiratory wheezes in all lung fields. Called respiratory. Patient was given a nebulizer treatment and placed on cpap to sleep. After treatment , patient reported feeling much better.

## 2018-01-28 NOTE — CARE PLAN
Problem: Safety  Goal: Will remain free from injury  Outcome: PROGRESSING AS EXPECTED  Patient is gaining strength and becoming more independent with ambulation.    Problem: Infection  Goal: Will remain free from infection  Outcome: PROGRESSING AS EXPECTED  Patient feeling stronger today. Reports feeling much better.

## 2018-01-28 NOTE — PROGRESS NOTES
Inpatient Anticoagulation Service Note    Date: 1/27/2018  Reason for Anticoagulation: Bioprosthetic Valve Replacement (Aortic valve replacement)        Hemoglobin Value: (!) 9.4  Hematocrit Value: (!) 30.2  Lab Platelet Value: 172  Target INR: 2.0 to 3.0    INR from last 7 days     Date/Time INR Value    01/27/18 0248 (!)  2.29    01/26/18 0853 (!)  2.41    01/25/18 1950 (!)  2.9        Dose from last 7 days     Date/Time Dose (mg)    01/27/18 1600  2.5    01/26/18 1400  2.5    01/26/18 0500  2.5        Average Dose (mg):  (Home dose: 5mg Sun/Tues and 2.5mg AOD per medrec)  Significant Interactions: Antiplatelet Medications, Antibiotics, Statin, Thyroid Medications  Bridge Therapy: No     Reversal Agent Administered: Not Applicable  Comments: INR therapeutic. No dose change. H/H appears stable with no indication of bleeding. No new DDI.     Plan:  Warfarin 2.5mg with INR check tomorrow  Education Material Provided?: No (Chronic tx)  Pharmacist suggested discharge dosing: Warfarin 5mg PO every Sun/Tue and 2.5mg on all other days with close f/u within 3 days       Mamie Phillips, PharmD., BCPS

## 2018-01-28 NOTE — PROGRESS NOTES
Assumed care of patient. Bedside report received from NANY Hernández. Updated on POC, call light within reach and fall precautions in place. Bed locked and in lowest position. Patient asleep at this time. MAR, labs, orders reviewed.

## 2018-01-28 NOTE — PROGRESS NOTES
Internal Medicine Interval Note  Note Author: Christopher Cho M.D.     Name Efrain Martines 1950   Age/Sex 68 y.o. male   MRN [id number]   Code Status Full Code     After 5PM or if no immediate response to page, please call for cross-coverage  Attending/Team: Dr. Farrar/Michael See Patient List for primary contact information  Call [phone] to page    1st Call - Day Intern (R1):   Dr. Roberson 2nd Call - Day Sr. Resident (R2/R3):   Dr. Vences     Reason for interval visit  (Principal Problem)   Hypoxia    Interval Problem Daily Status Update  (24 hours)   Events overnight. Patient reports feeling better but still feeling short of breath. Using CPAP at night. SPO2 93% on room air.  Doxycycline changed to azithromycin due to prolonged QTC. Continues on ceftriaxone and Tamiflu. IV fluids held due to chronic kidney disease. Bladder scan shows 415 mL INR 2.03.      Patient Active Problem List   Diagnosis   • Acute hypoxic respiratory failure    • ESRD (end stage renal disease) (CMS-[facility])   • Hypertension   • Hyperlipidemia   • Diabetes (CMS-[facility])   • Hypothyroidism   • Obstructive sleep apnea   • History of mechanical fall without injury   • H/O aortic valve replacement   • Renal osteodystrophy   • Hepatitis C   • Complete heart block (CMS-[facility])   • Anemia   • Hematuria   • Obesity   • Sepsis (CMS-[facility])       Review of Systems   Constitutional: Negative for chills and fever.   HENT: Negative for congestion and sore throat.    Eyes: Negative for blurred vision and double vision.   Respiratory: Positive for cough and shortness of breath. Negative for sputum production.    Cardiovascular: Positive for leg swelling. Negative for chest pain and palpitations.   Gastrointestinal: Negative for abdominal pain, blood in stool, constipation, diarrhea, nausea and vomiting.   Genitourinary: Negative for dysuria and urgency.   Musculoskeletal: Positive for joint pain. Negative for myalgias.   Skin: Negative for itching  and rash.   Neurological: Negative for dizziness, sensory change and headaches.   Psychiatric/Behavioral: Negative for depression. The patient is not nervous/anxious.        Consultants/Specialty  None    Disposition  Inpatient requiring resolution of sepsis, IV abx    Quality Measures    Reviewed items::  EKG reviewed, Radiology images reviewed, Labs reviewed and Medications reviewed  Leach catheter::  No Leach  : No central line.  DVT prophylaxis pharmacological::  Warfarin (Coumadin)  DVT prophylaxis - mechanical:  SCDs  Ulcer Prophylaxis::  No  Antibiotics:  Treating active infection/contamination beyond 24 hours perioperative coverage        Physical Exam     Vitals:    01/27/18 1930 01/27/18 2342 01/28/18 0354 01/28/18 0817   BP: 151/66 149/65 137/64 146/69   Pulse: 67 77 66 74   Resp: 18 17 16 18   Temp: 36.7 °C (98 °F) 36.7 °C (98 °F) 36.2 °C (97.2 °F) 36.4 °C (97.6 °F)   SpO2: 92% 92% 93% 95%   Weight:       Height:         Body mass index is 29.76 kg/m².    Oxygen Therapy:  Pulse Oximetry: 95 %, O2 (LPM): 2, O2 Delivery: Silicone Nasal Cannula      Physical Exam   Constitutional: He is oriented to person, place, and time and well-developed, well-nourished, and in no distress. No distress.   HENT:   Head: Normocephalic and atraumatic.   Right Ear: External ear normal.   Left Ear: External ear normal.   Mouth/Throat: Oropharynx is clear and moist. No oropharyngeal exudate.   Eyes: Conjunctivae are normal. Pupils are equal, round, and reactive to light. Right eye exhibits no discharge. Left eye exhibits no discharge. No scleral icterus.   Neck: Normal range of motion. Neck supple. No JVD present. No tracheal deviation present.   Cardiovascular: Normal rate, regular rhythm, normal heart sounds and intact distal pulses.  Exam reveals no gallop and no friction rub.    No murmur heard.  Pulmonary/Chest: Effort normal and breath sounds normal. No stridor. No respiratory distress. He has no wheezes. He has no  rales.   Abdominal: Soft. Bowel sounds are normal. He exhibits no distension. There is no tenderness. There is no rebound.   Musculoskeletal: Normal range of motion. He exhibits edema (1+ on the right and 2+ pitting on the left. Patient says this is normal for him.). He exhibits no tenderness.   Lymphadenopathy:     He has no cervical adenopathy.   Neurological: He is alert and oriented to person, place, and time. No cranial nerve deficit. He exhibits normal muscle tone.   Skin: Skin is warm and dry. No rash noted. He is not diaphoretic. No erythema. No pallor.   Psychiatric: Memory and affect normal.   Nursing note and vitals reviewed.      Lab Data Review:   1/26/2018  1:45 PM    Recent Labs      01/26/18 0442 01/27/18 0248 01/28/18 0416 01/28/18   0823   SODIUM  139  138   --   141   POTASSIUM  5.2  4.8   --   5.2   CHLORIDE  116*  115*   --   115*   CO2  15*  17*   --   17*   BUN  59*  58*   --   60*   CREATININE  5.29*  5.54*   --   5.50*   MAGNESIUM  1.8   --   1.9   --    PHOSPHORUS  5.9*   --    --    --    CALCIUM  8.5  9.3   --   9.6       Recent Labs      01/25/18   1950  01/26/18 0442 01/27/18 0248 01/28/18   0823   ALTSGPT  11  13   --    --    ASTSGOT  13  18   --    --    ALKPHOSPHAT  74  53   --    --    TBILIRUBIN  0.3  0.3   --    --    GLUCOSE  129*  98  105*  125*       Recent Labs      01/26/18 0442 01/26/18   0853  01/27/18 0248 01/28/18 0416 01/28/18   0823   RBC  3.16*   --   3.28*   --   3.64*   HEMOGLOBIN  9.3*   --   9.4*   --   10.3*   HEMATOCRIT  28.8*   --   30.2*   --   33.8*   PLATELETCT  169   --   172   --   204   PROTHROMBTM   --   25.9*  24.9*  22.6*   --    INR   --   2.41*  2.29*  2.03*   --    IRON  36*   --    --    --    --    FERRITIN  43.1   --    --    --    --    [facility]  262   --    --    --    --        Recent Labs      01/25/18   1950  01/26/18   0442  01/27/18   0248  01/28/18   0823   WBC  7.8  6.2  5.6  7.0   NEUTSPOLYS  72.60*  60.80   52.10  59.40   LYMPHOCYTES  9.50*  18.20*  24.70  22.90   MONOCYTES  10.50  14.10*  16.10*  9.50   EOSINOPHILS  5.90  5.30  6.10  7.50*   BASOPHILS  0.50  0.80  0.50  0.30   ASTSGOT  13  18   --    --    ALTSGPT  11  13   --    --    ALKPHOSPHAT  74  53   --    --    TBILIRUBIN  0.3  0.3   --    --          Assessment/Plan     * Acute hypoxic respiratory failure - (present on admission)   Assessment & Plan    Likely secondary to influenza A infection with possible superimposed community acquired pneumonia. Chest x-ray shows bibasilar infiltrates. However, white count was normal on admission and patient was afebrile. Blood cultures were negative. Pro-calcitonin was 0.26, elevated.    Plan:  -Continue oxygen by nasal cannula as needed. Weaning off if possible.  -Continue hospital stay due to shortness of breath with mobilization.  -Continue Tamiflu renal dosing for 5 day course.  -Continue ceftriaxone for possible community-acquired pneumonia. Change azithromycin to doxycycline due to prolonged QTC of 510.  -Continue respiratory care protocol.  -DuoNebs as necessary and every 4 hours scheduled for next 24 hours.        Sepsis (CMS-HCC)- (present on admission)   Assessment & Plan    Resolved.  On admission, 2/4 SIRS, 0/3 qSOFA on admit. Influenza positive with possible superimposed CAP. Procalcitonin elevated on admit. Blood cultures show NGTD    Plan:  -Continue to hold furosemide.  -Continue Tamiflu, ceftriaxone, and doxycycline.        ESRD (end stage renal disease) (CMS-HCC)- (present on admission)   Assessment & Plan    Creatinine 5.66 on admission. CK disease stage IV nearing dialysis.    Plan:  -Vein mapping ordered for today for AV fistula since patient will stay in the hospital.  -Avoid nephrotoxic medications.  -Continue to monitor ins and outs and daily weights.  -Continue to hold furosemide and metolazone  -Continue calcitriol. No bicarbonate for now.        Hematuria- (present on admission)   Assessment &  Plan    Patient [facility] 2+ [facility] renal negative on admission.    Plan:   - Consider urology consult as outpatient.        Diabetes (CMS-HCC)- (present on admission)   Assessment & Plan    Patient checks blood glucose weekly and are normally < 200's. Home regimen includes Lantus 45 units at bedtime. Hemoglobin A1c was 6.2 on November 2017 and 6.6 on January 26, 2018.    Plan;  -Continue home Lantus at 15 units at bedtime.  -Continue regular insulin sliding scale.   -Continue hypoglycemia protocol.        Hypertension- (present on admission)   Assessment & Plan    -170s/ 60-80s. Was previously on losartan but [facility].    Plan:  -Continue home amlodipine 10 mg by mouth daily. Hold for BP< 90/60.  -Continue home hydralazine 25 mg by mouth 3 times daily.Hold for BP< 90/60.  -Continue home metoprolol  mg per mouth daily. Hold for BP<98/60 or heart rate <60.          Anemia- (present on admission)   Assessment & Plan    Likely anemia of chronic disease secondary to end-stage renal disease.  Ferritin 43.1, iron low at 36, TIBC 262, %saturation 14, vitamin B-12 370, folate> 235.    Plan:  -Consider erythropoietin on outpatient basis.        Renal osteodystrophy- (present on admission)   Assessment & Plan    Low vitamin D level and elevated PTH with phosphate less than 10. Likely due to renal disease.    Plan:  -Continue home calcitriol 0.25 µg daily.          Complete heart block (CMS-HCC)- (present on admission)   Assessment & Plan    History of complete heart block secondary to likely ischemia vs degenerative changes status post pacemaker placement in 2007. Echocardiogram in November 2016 showed EF 50-55% with normal left ventricular size and function.        Hepatitis C- (present on admission)   Assessment & Plan    History of hepatitis B infection status post her bony treatment. Hepatitis B negative.         H/O aortic valve replacement- (present on  admission)   Assessment & Plan    History of prosthetic aortic valve replacement in 1993. INR goal 2-3. INR was 2.9 on admission. Home regimen of warfarin 5 mg on Tuesdays and Thursdays and 2.5 mg all other days. INR today 2.03 within goal.    Plan:  -Continue warfarin dosing per pharmacy.        History of mechanical fall without injury- (present on admission)   Assessment & Plan    Status post carrying case of water and losing balance due to how heavy it was. Head CT was negative. No rib fractures on chest x-ray. 0 right rib contusion.    Plan:  -Continue Tylenol as needed for pain.        Obstructive sleep apnea- (present on admission)   Assessment & Plan    History of obstructive sleep apnea complying with CPAP at night.    Plan:  -Continue CPAP per RT at night.        Hypothyroidism- (present on admission)   Assessment & Plan    TSH 4.22 on 11/2017.    Plan:  -Continue home levothyroxine 1.5 µg mouth daily.        Hyperlipidemia- (present on admission)   Assessment & Plan    Total cholesterol 145, HDL 19, LDL 85. ASCVD risk 29.7% in 10 years.    Plan:  -Continue home lovastatin 40 mg by mouth daily.            Christopher Cho M.D.     The patient was seen by me face to face. I personally had a discussion with the patient. The case was discussed with our resident team, I examined the patient and confirmed the essential components of the history, physical examination, diagnosis and treatment plan as needed. I agree with the patient care as documented by the resident and edited as above by me. See resident's note above for complete details of service. The overall treatment regimen will be carried out as described above.     Thank you:  Harry Farrar MD, FACP  [facility]  Pager: Use Tiger Text (use resident info under treatment team for day to day floor issues)  Office: [phone] Ext. [phone]  Fax: [phone] (non PHI only)

## 2018-01-29 LAB
ANION GAP SERPL CALC-SCNC: 7 MMOL/L (ref 0–11.9)
BASE EXCESS BLDA CALC-SCNC: -10 MMOL/L (ref -4–3)
BASOPHILS # BLD AUTO: 0.4 % (ref 0–1.8)
BASOPHILS # BLD: 0.03 K/UL (ref 0–0.12)
BODY TEMPERATURE: 37 CENTIGRADE
BUN SERPL-MCNC: 58 MG/DL (ref 8–22)
CALCIUM SERPL-MCNC: 9.3 MG/DL (ref 8.5–10.5)
CHLORIDE SERPL-SCNC: 116 MMOL/L (ref 96–112)
CO2 SERPL-SCNC: 16 MMOL/L (ref 20–33)
CREAT SERPL-MCNC: 5.15 MG/DL (ref 0.5–1.4)
EOSINOPHIL # BLD AUTO: 0.65 K/UL (ref 0–0.51)
EOSINOPHIL NFR BLD: 8 % (ref 0–6.9)
ERYTHROCYTE [DISTWIDTH] IN BLOOD BY AUTOMATED COUNT: 46.5 FL (ref 35.9–50)
GLUCOSE BLD-MCNC: 110 MG/DL (ref 65–99)
GLUCOSE BLD-MCNC: 136 MG/DL (ref 65–99)
GLUCOSE BLD-MCNC: 148 MG/DL (ref 65–99)
GLUCOSE BLD-MCNC: 162 MG/DL (ref 65–99)
GLUCOSE SERPL-MCNC: 111 MG/DL (ref 65–99)
HCO3 BLDA-SCNC: 15 MMOL/L (ref 17–25)
HCT VFR BLD AUTO: 31.3 % (ref 42–52)
HGB BLD-MCNC: 10.2 G/DL (ref 14–18)
IMM GRANULOCYTES # BLD AUTO: 0.05 K/UL (ref 0–0.11)
IMM GRANULOCYTES NFR BLD AUTO: 0.6 % (ref 0–0.9)
INHALED O2 FLOW RATE: 1.5 L/MIN (ref 2–10)
INR PPP: 2.09 (ref 0.87–1.13)
LYMPHOCYTES # BLD AUTO: 1.68 K/UL (ref 1–4.8)
LYMPHOCYTES NFR BLD: 20.8 % (ref 22–41)
MAGNESIUM SERPL-MCNC: 1.9 MG/DL (ref 1.5–2.5)
MCH RBC QN AUTO: 29.8 PG (ref 27–33)
MCHC RBC AUTO-ENTMCNC: 32.6 G/DL (ref 33.7–35.3)
MCV RBC AUTO: 91.5 FL (ref 81.4–97.8)
MONOCYTES # BLD AUTO: 0.65 K/UL (ref 0–0.85)
MONOCYTES NFR BLD AUTO: 8 % (ref 0–13.4)
NEUTROPHILS # BLD AUTO: 5.02 K/UL (ref 1.82–7.42)
NEUTROPHILS NFR BLD: 62.2 % (ref 44–72)
NRBC # BLD AUTO: 0 K/UL
NRBC BLD-RTO: 0 /100 WBC
PCO2 BLDA: 26.4 MMHG (ref 26–37)
PCO2 TEMP ADJ BLDA: 26.4 MMHG (ref 26–37)
PH BLDA: 7.36 [PH] (ref 7.4–7.5)
PH TEMP ADJ BLDA: 7.36 [PH] (ref 7.4–7.5)
PLATELET # BLD AUTO: 183 K/UL (ref 164–446)
PMV BLD AUTO: 12.1 FL (ref 9–12.9)
PO2 BLDA: 83.3 MMHG (ref 64–87)
PO2 TEMP ADJ BLDA: 83.3 MMHG (ref 64–87)
POTASSIUM SERPL-SCNC: 4.9 MMOL/L (ref 3.6–5.5)
PROTHROMBIN TIME: 23.2 SEC (ref 12–14.6)
RBC # BLD AUTO: 3.42 M/UL (ref 4.7–6.1)
SAO2 % BLDA: 95.5 % (ref 93–99)
SODIUM SERPL-SCNC: 139 MMOL/L (ref 135–145)
WBC # BLD AUTO: 8.1 K/UL (ref 4.8–10.8)

## 2018-01-29 PROCEDURE — 85610 PROTHROMBIN TIME: CPT

## 2018-01-29 PROCEDURE — 97165 OT EVAL LOW COMPLEX 30 MIN: CPT

## 2018-01-29 PROCEDURE — 700111 HCHG RX REV CODE 636 W/ 250 OVERRIDE (IP): Performed by: STUDENT IN AN ORGANIZED HEALTH CARE EDUCATION/TRAINING PROGRAM

## 2018-01-29 PROCEDURE — 80048 BASIC METABOLIC PNL TOTAL CA: CPT

## 2018-01-29 PROCEDURE — 36415 COLL VENOUS BLD VENIPUNCTURE: CPT

## 2018-01-29 PROCEDURE — A9270 NON-COVERED ITEM OR SERVICE: HCPCS | Performed by: STUDENT IN AN ORGANIZED HEALTH CARE EDUCATION/TRAINING PROGRAM

## 2018-01-29 PROCEDURE — 770020 HCHG ROOM/CARE - TELE (206)

## 2018-01-29 PROCEDURE — 83735 ASSAY OF MAGNESIUM: CPT

## 2018-01-29 PROCEDURE — 94660 CPAP INITIATION&MGMT: CPT

## 2018-01-29 PROCEDURE — 82962 GLUCOSE BLOOD TEST: CPT

## 2018-01-29 PROCEDURE — 93931 UPPER EXTREMITY STUDY: CPT

## 2018-01-29 PROCEDURE — 700102 HCHG RX REV CODE 250 W/ 637 OVERRIDE(OP): Performed by: HOSPITALIST

## 2018-01-29 PROCEDURE — A9270 NON-COVERED ITEM OR SERVICE: HCPCS | Performed by: HOSPITALIST

## 2018-01-29 PROCEDURE — 93970 EXTREMITY STUDY: CPT

## 2018-01-29 PROCEDURE — 82803 BLOOD GASES ANY COMBINATION: CPT

## 2018-01-29 PROCEDURE — 99233 SBSQ HOSP IP/OBS HIGH 50: CPT | Mod: GC | Performed by: INTERNAL MEDICINE

## 2018-01-29 PROCEDURE — G8988 SELF CARE GOAL STATUS: HCPCS | Mod: CI

## 2018-01-29 PROCEDURE — 700102 HCHG RX REV CODE 250 W/ 637 OVERRIDE(OP): Performed by: STUDENT IN AN ORGANIZED HEALTH CARE EDUCATION/TRAINING PROGRAM

## 2018-01-29 PROCEDURE — G8989 SELF CARE D/C STATUS: HCPCS | Mod: CI

## 2018-01-29 PROCEDURE — G8987 SELF CARE CURRENT STATUS: HCPCS | Mod: CI

## 2018-01-29 PROCEDURE — 85025 COMPLETE CBC W/AUTO DIFF WBC: CPT

## 2018-01-29 RX ADMIN — DOXYCYCLINE 100 MG: 100 TABLET ORAL at 20:59

## 2018-01-29 RX ADMIN — AMLODIPINE BESYLATE 10 MG: 10 TABLET ORAL at 10:16

## 2018-01-29 RX ADMIN — CALCITRIOL 0.25 MCG: 0.25 CAPSULE, LIQUID FILLED ORAL at 10:16

## 2018-01-29 RX ADMIN — LOVASTATIN 40 MG: 20 TABLET ORAL at 20:59

## 2018-01-29 RX ADMIN — LEVOTHYROXINE SODIUM 125 MCG: 125 TABLET ORAL at 10:17

## 2018-01-29 RX ADMIN — CEFTRIAXONE 2 G: 2 INJECTION, POWDER, FOR SOLUTION INTRAMUSCULAR; INTRAVENOUS at 09:22

## 2018-01-29 RX ADMIN — DOXYCYCLINE 100 MG: 100 TABLET ORAL at 10:16

## 2018-01-29 RX ADMIN — PREGABALIN 150 MG: 75 CAPSULE ORAL at 20:59

## 2018-01-29 RX ADMIN — HYDRALAZINE HYDROCHLORIDE 25 MG: 25 TABLET, FILM COATED ORAL at 10:16

## 2018-01-29 RX ADMIN — INSULIN GLARGINE 15 UNITS: 100 INJECTION, SOLUTION SUBCUTANEOUS at 20:53

## 2018-01-29 RX ADMIN — OSELTAMIVIR PHOSPHATE 30 MG: 30 CAPSULE ORAL at 20:59

## 2018-01-29 RX ADMIN — WARFARIN SODIUM 2.5 MG: 2.5 TABLET ORAL at 17:41

## 2018-01-29 RX ADMIN — ASPIRIN 81 MG: 81 TABLET, COATED ORAL at 20:59

## 2018-01-29 RX ADMIN — INSULIN HUMAN 1 UNITS: 100 INJECTION, SOLUTION PARENTERAL at 17:28

## 2018-01-29 RX ADMIN — HYDRALAZINE HYDROCHLORIDE 25 MG: 25 TABLET, FILM COATED ORAL at 15:35

## 2018-01-29 RX ADMIN — METOPROLOL SUCCINATE 150 MG: 50 TABLET, FILM COATED, EXTENDED RELEASE ORAL at 10:17

## 2018-01-29 RX ADMIN — HYDRALAZINE HYDROCHLORIDE 25 MG: 25 TABLET, FILM COATED ORAL at 20:59

## 2018-01-29 ASSESSMENT — PAIN SCALES - GENERAL
PAINLEVEL_OUTOF10: 0

## 2018-01-29 ASSESSMENT — PATIENT HEALTH QUESTIONNAIRE - PHQ9
2. FEELING DOWN, DEPRESSED, IRRITABLE, OR HOPELESS: NOT AT ALL
SUM OF ALL RESPONSES TO PHQ9 QUESTIONS 1 AND 2: 0
1. LITTLE INTEREST OR PLEASURE IN DOING THINGS: NOT AT ALL
SUM OF ALL RESPONSES TO PHQ QUESTIONS 1-9: 0

## 2018-01-29 ASSESSMENT — COGNITIVE AND FUNCTIONAL STATUS - GENERAL
DAILY ACTIVITIY SCORE: 23
SUGGESTED CMS G CODE MODIFIER DAILY ACTIVITY: CI
HELP NEEDED FOR BATHING: A LITTLE

## 2018-01-29 ASSESSMENT — LIFESTYLE VARIABLES
TOTAL SCORE: 0
EVER HAD A DRINK FIRST THING IN THE MORNING TO STEADY YOUR NERVES TO GET RID OF A HANGOVER: NO
HAVE PEOPLE ANNOYED YOU BY CRITICIZING YOUR DRINKING: NO
DO YOU DRINK ALCOHOL: YES
HAVE YOU EVER FELT YOU SHOULD CUT DOWN ON YOUR DRINKING: NO
CONSUMPTION TOTAL: NEGATIVE
TOTAL SCORE: 0
EVER FELT BAD OR GUILTY ABOUT YOUR DRINKING: NO
AVERAGE NUMBER OF DAYS PER WEEK YOU HAVE A DRINK CONTAINING ALCOHOL: 0
HOW MANY TIMES IN THE PAST YEAR HAVE YOU HAD 5 OR MORE DRINKS IN A DAY: 0
TOTAL SCORE: 0
ON A TYPICAL DAY WHEN YOU DRINK ALCOHOL HOW MANY DRINKS DO YOU HAVE: 0

## 2018-01-29 ASSESSMENT — ENCOUNTER SYMPTOMS
WHEEZING: 0
CHILLS: 0
SHORTNESS OF BREATH: 1
FEVER: 0
WEAKNESS: 1
DIZZINESS: 0
SENSORY CHANGE: 0
ORTHOPNEA: 0
ABDOMINAL PAIN: 0
COUGH: 0
VOMITING: 0
DOUBLE VISION: 0
BLURRED VISION: 0
PALPITATIONS: 0
DEPRESSION: 0
NAUSEA: 0
SORE THROAT: 0

## 2018-01-29 ASSESSMENT — ACTIVITIES OF DAILY LIVING (ADL): TOILETING: INDEPENDENT

## 2018-01-29 NOTE — FACE TO FACE
Face to Face Note  -  Durable Medical Equipment    Ania Roberson M.D. - NPI: [id number]  I certify that this patient is under my care and that they had a durable medical equipment(DME)face to face encounter by myself that meets the physician DME face-to-face encounter requirements with this patient on:    Date of encounter:   Patient:                    MRN:                       YOB: 2020  Efrain Martines  [id number]  1950     The encounter with the patient was in whole, or in part, for the following medical condition, which is the primary reason for durable medical equipment:  Other - Sleep Apnea        I certify that, based on my findings, the following durable medical equipment is medically necessary:  Oxygen.    HOME O2 Saturation Measurements:(Values must be present for Home Oxygen orders)  Room air sat at rest: 93  Room air sat with amb: 69  With liters of O2: 1, O2 sat at rest with O2: 95  With Liters of O2: 2, O2 sat with amb with O2 : 91  Is the patient mobile?: Yes    My Clinical findings support the need for the above equipment due to:  Hypoxia    Supporting Symptoms: Patient hypoxic with ambulatory oxygen assessment

## 2018-01-29 NOTE — PROGRESS NOTES
Inpatient Anticoagulation Service Note    Date: 1/29/2018  Reason for Anticoagulation: Bioprosthetic Valve Replacement (Aortic valve replacement)        Hemoglobin Value: (!) 10.2  Hematocrit Value: (!) 31.3  Lab Platelet Value: 183  Target INR: 2.0 to 3.0    INR from last 7 days     Date/Time INR Value    01/29/18 0232 (!)  2.09    01/28/18 0416 (!)  2.03    01/27/18 0248 (!)  2.29    01/26/18 0853 (!)  2.41    01/25/18 1950 (!)  2.9        Dose from last 7 days     Date/Time Dose (mg)    01/29/18 1100  2.5    01/28/18 1100  5    01/27/18 1600  2.5    01/26/18 1400  2.5    01/26/18 0500  2.5        Average Dose (mg):  (Home dose: 5mg Sun/Tues and 2.5mg AOD per medrec)  Significant Interactions: Antibiotics, Statin, Thyroid Medications, Aspirin  Bridge Therapy: No     Reversal Agent Administered: Not Applicable  Comments: INR remains therapeutic. H/H low but stable. Plts WNL and relatively stable since admission. Azithromycin changed to doxycycline yesterday for prolonged QTc. All other warfarin-drug interactions remain unchanged. Give warfarin 2.5 mg po tonight per home dose regimen. Change INR to be checked every Mon/Wed/Fri.     Plan:   Give warfarin 2.5 mg po tonight per home dose regimen. Change INR to be checked every Mon/Wed/Fri.   Education Material Provided?: No (Chronic tx)  Pharmacist suggested discharge dosing: Warfarin 5mg PO every Sun/Tues and 2.5mg on all other days with repeat INR within 4-5 days of discharge.      Amanda Quinn, PharmD, BCOP

## 2018-01-29 NOTE — PROGRESS NOTES
0830 per MD orders, pt to have walking 02 saturations recorded.  Patient walked with OT and this RN to door of room, using walker with much reminders required to use the walker and do not alk away from the walker.  After 5 steps, on room air, pt's sats dropped to 81%. Pt stopped, deep bbreathing encouraged, with 0x sats 85%.  Pt walked approx 5 feet to door, 02 sats 69%.  Quickly put 02 back onto pt, encouraged deep breaths through nose, out through pursed lips.  Pt then walked back to chair on 2L 02 with sats rising to 92-93%.  Pt sitting in chair, OT continuing to work with pt.  Will continue to monitor.

## 2018-01-29 NOTE — CARE PLAN
Problem: Safety  Goal: Will remain free from injury  Outcome: PROGRESSING AS EXPECTED  Bed alarm in use. Call light in reach and encouraged    Problem: Venous Thromboembolism (VTW)/Deep Vein Thrombosis (DVT) Prevention:  Goal: Patient will participate in Venous Thrombosis (VTE)/Deep Vein Thrombosis (DVT)Prevention Measures  Outcome: PROGRESSING AS EXPECTED  Patient taking coumadin

## 2018-01-29 NOTE — PROGRESS NOTES
Internal Medicine Interval Note  Note Author: Ania Roberson M.D.     Name Efrain Martines 1950   Age/Sex 68 y.o. male   MRN [id number]   Code Status Full Code     After 5PM or if no immediate response to page, please call for cross-coverage  Attending/Team: Dr. Minor/Michael See Patient List for primary contact information  Call [phone] to page    1st Call - Day Intern (R1):   Dr. Roberson 2nd Call - Day Sr. Resident (R2/R3):   Dr. Trinidad     Reason for interval visit  (Principal Problem)   Hypoxia      Interval Problem Daily Status Update  (24 hours)   - Patient stable, states he feels slightly better this AM, continues to require oxygen during ambulatio  - ECHO, ABG pending  - Will order nocturnal pulse oximetry   - Continue Rocephin, Azithromycin, Tamiflu (renal dosing), hold IVF due to CKD  - PT/OT on consult, encourage ambulation      Review of Systems   Constitutional: Negative for chills and fever.   HENT: Negative for congestion and sore throat.    Eyes: Negative for blurred vision and double vision.   Respiratory: Positive for shortness of breath. Negative for cough and wheezing.    Cardiovascular: Negative for chest pain, palpitations and orthopnea.   Gastrointestinal: Negative for abdominal pain, nausea and vomiting.   Genitourinary: Negative for dysuria and urgency.   Musculoskeletal: Negative for joint pain.   Skin: Negative for rash.   Neurological: Positive for weakness. Negative for dizziness and sensory change.   Psychiatric/Behavioral: Negative for depression.       Consultants/Specialty  None    Disposition  Inpatient requiring IV abx, resolution of hypoxia    Quality Measures    Reviewed items::  EKG reviewed, Radiology images reviewed, Labs reviewed and Medications reviewed  Leach catheter::  No Leach  : No central line.  DVT prophylaxis pharmacological::  Warfarin (Coumadin)  DVT prophylaxis - mechanical:  SCDs  Ulcer Prophylaxis::  No  Antibiotics:  Treating active  infection/contamination beyond 24 hours perioperative coverage        Physical Exam     Vitals:    01/29/18 0718 01/29/18 0800 01/29/18 0929 01/29/18 1127   BP: 158/69   158/84   Pulse: 73   95   Resp: 18   16   Temp: 37.1 °C (98.7 °F)   37.4 °C (99.3 °F)   SpO2: 94% 94% 92% 97%   Weight:       Height:         Body mass index is 29.76 kg/m².    Oxygen Therapy:  Pulse Oximetry: 97 %, O2 (LPM): 3, O2 Delivery: Silicone Nasal Cannula      Physical Exam   Constitutional: He is oriented to person, place, and time and well-developed, well-nourished, and in no distress.   HENT:   Head: Normocephalic and atraumatic.   Eyes: Conjunctivae are normal. Pupils are equal, round, and reactive to light. No scleral icterus.   Neck: Normal range of motion. Neck supple. No JVD present.   Cardiovascular: Normal rate, regular rhythm and normal heart sounds.    No murmur heard.  Pulmonary/Chest: Effort normal and breath sounds normal. No respiratory distress. He has no wheezes. He has no rales.   Abdominal: Soft. Bowel sounds are normal. He exhibits no distension. There is no tenderness.   Musculoskeletal: Normal range of motion. He exhibits no edema.   Lymphadenopathy:     He has no cervical adenopathy.   Neurological: He is alert and oriented to person, place, and time. No cranial nerve deficit.   Skin: No rash noted. No erythema.   Psychiatric: Affect normal.       Lab Data Review:   1/26/2018  1:45 PM    Recent Labs      01/27/18 0248 01/28/18 0416 01/28/18 0823 01/29/18   0232   SODIUM  138   --   141  139   POTASSIUM  4.8   --   5.2  4.9   CHLORIDE  115*   --   115*  116*   CO2  17*   --   17*  16*   BUN  58*   --   60*  58*   CREATININE  5.54*   --   5.50*  5.15*   MAGNESIUM   --   1.9   --   1.9   CALCIUM  9.3   --   9.6  9.3       Recent Labs      01/27/18 0248 01/28/18   0823  01/29/18   0232   GLUCOSE  105*  125*  111*       Recent Labs      01/27/18 0248 01/28/18 0416  01/28/18   0823  01/29/18   0231   01/29/18   0232   RBC  3.28*   --   3.64*  3.42*   --    HEMOGLOBIN  9.4*   --   10.3*  10.2*   --    HEMATOCRIT  30.2*   --   33.8*  31.3*   --    PLATELETCT  172   --   204  183   --    PROTHROMBTM  24.9*  22.6*   --    --   23.2*   INR  2.29*  2.03*   --    --   2.09*       Recent Labs      01/27/18   0248  01/28/18   0823  01/29/18   0231   WBC  5.6  7.0  8.1   NEUTSPOLYS  52.10  59.40  62.20   LYMPHOCYTES  24.70  22.90  20.80*   MONOCYTES  16.10*  9.50  8.00   EOSINOPHILS  6.10  7.50*  8.00*   BASOPHILS  0.50  0.30  0.40         Assessment/Plan     * Acute hypoxic respiratory failure - (present on admission)   Assessment & Plan    Likely secondary to influenza A infection with possible superimposed community acquired pneumonia. Chest x-ray shows bibasilar infiltrates. However, white count was normal on admission and patient was afebrile. Blood cultures were negative. Pro-calcitonin was 0.26, elevated.    - blood cultures show NGTD  - tamiflu renal dosing for 5 day course  - ceftriaxone/doxycycline for possible cap  - rt protocol, continuous O2, IS  - continue to wean oxygen as tolerated  - ECHO pending  - ABG pending to assess for acute vs. chronic hypoxia        Sepsis (CMS-HCC)- (present on admission)   Assessment & Plan    Resolved.  On admission, 2/4 SIRS, 0/3 qSOFA on admit. Influenza positive with possible superimposed CAP. Procalcitonin elevated on admit. Blood cultures show NGTD    Plan:  -Continue to hold furosemide.  -Continue Tamiflu, ceftriaxone, and doxycycline.        ESRD (end stage renal disease) (CMS-HCC)- (present on admission)   Assessment & Plan    Creatinine 5.66 on admission. CK disease stage IV nearing dialysis.    Plan:  -Vein mapping ordered for today for AV fistula since patient will stay in the hospital.  -Avoid nephrotoxic medications.  -Continue to monitor ins and outs and daily weights.  -Continue to hold furosemide and metolazone  -Continue calcitriol. No bicarbonate for now.         Hematuria- (present on admission)   Assessment & Plan    Patient has [facility] 2+ [facility] renal negative on admission.    Plan:   - Consider urology consult as outpatient.        Diabetes (CMS-HCC)- (present on admission)   Assessment & Plan    Patient checks blood glucose weekly and are normally < 200's. Home regimen includes Lantus 45 units at bedtime. Hemoglobin A1c was 6.2 on November 2017 and 6.6 on January 26, 2018.    Plan;  -Continue home Lantus at 15 units at bedtime.  -Continue regular insulin sliding scale.   -Continue hypoglycemia protocol.        Hypertension- (present on admission)   Assessment & Plan    -170s/ 60-80s. Was previously on losartan but [facility]    Plan:  -Continue home amlodipine 10 mg by mouth daily. Hold for BP< 90/60.  -Continue home hydralazine 25 mg by mouth 3 times daily.Hold for BP< 90/60.  -Continue home metoprolol  mg per mouth daily. Hold for BP<90/60 or heart rate <60.          Anemia- (present on admission)   Assessment & Plan    Likely anemia of chronic disease secondary to end-stage renal disease.  Ferritin 43.1, iron low at 36, TIBC 262, %saturation 14, vitamin B-12 370, folate> 235.    Plan:  -Consider erythropoietin on outpatient basis.        Renal osteodystrophy- (present on admission)   Assessment & Plan    Low vitamin D level and elevated PTH with phosphate less than 10. Likely due to renal disease.    Plan:  -Continue home calcitriol 0.25 µg daily        Complete heart block (CMS-HCC)- (present on admission)   Assessment & Plan    - History of complete heart block secondary s/p pacemaker placement in 2007  - Echocardiogram in 11/2016 showed EF 50-55% with normal left ventricular size and function.  - Repeat ECHO pending        Hepatitis C- (present on admission)   Assessment & Plan    History of hepatitis B infection status post her bony treatment. Hepatitis B negative.         H/O aortic valve  replacement- (present on admission)   Assessment & Plan    History of prosthetic aortic valve replacement in 1993. INR was 2.9 on admission. Home regimen of warfarin 5 mg on Tuesdays and Thursdays and 2.5 mg all other days.    Plan:  -Continue warfarin dosing per pharmacy.        History of mechanical fall without injury- (present on admission)   Assessment & Plan    Status post carrying case of water and losing balance due to how heavy it was. Head CT was negative. No rib fractures on chest x-ray. 0 right rib contusion.    Plan:  -Continue Tylenol as needed for pain.        Obstructive sleep apnea- (present on admission)   Assessment & Plan    History of obstructive sleep apnea complying with CPAP at night.    Plan:  -Continue CPAP per RT at night.        Hypothyroidism- (present on admission)   Assessment & Plan    TSH 4.22 on 11/2017.    Plan:  -Continue home levothyroxine 1.5 µg mouth daily.        Hyperlipidemia- (present on admission)   Assessment & Plan    Total cholesterol 145, HDL 19, LDL 85. ASCVD risk 29.7% in 10 years.    Plan:  -Continue home lovastatin 40 mg by mouth daily.            Ania Roberson M.D.

## 2018-01-29 NOTE — THERAPY
"Occupational Therapy Evaluation completed.   Functional Status: SBA sit to stands and pivot with FWW, SBA LB dressing without AE, supv standing grooming   Plan of Care: Patient with no further skilled OT needs in the acute care setting at this time  Discharge Recommendations:  Equipment: Will Continue to Assess for Equipment Needs (Pt may need FWW; PT to assess gait). Post-acute therapy Discharge to home with outpatient or home health for additional skilled therapy services.    See \"Rehab Therapy-Acute\" Patient Summary Report for complete documentation.    68 y.o. male with h/o CKD, DMII, HLD, HTN, hep C, AVR, CVA, recent GLF, now admitted with generalized weakness. Dx with influenza. Pt seen now for OT eval. Pt complete sit to stands, transfers with FWW, amb to/from bathroom, standing grooming, LB dressing. Pt uses shower chair at home. Was able to don socks with some difficulty, but states she does not wear socks at home. OT provided education and hand-out on energy conservation/pacing strategies. Pt appears to be functioning at or near baseline from OT standpoint. He has not further acute OT needs at this time, but may benefit from HH OT on DC.     "

## 2018-01-30 ENCOUNTER — APPOINTMENT (OUTPATIENT)
Dept: RADIOLOGY | Facility: MEDICAL CENTER | Age: 68
DRG: 871 | End: 2018-01-30
Attending: INTERNAL MEDICINE
Payer: MEDICARE

## 2018-01-30 PROBLEM — I50.23 ACUTE ON CHRONIC SYSTOLIC HEART FAILURE (HCC): Status: ACTIVE | Noted: 2018-01-30

## 2018-01-30 LAB
ANION GAP SERPL CALC-SCNC: 7 MMOL/L (ref 0–11.9)
BACTERIA BLD CULT: NORMAL
BACTERIA BLD CULT: NORMAL
BASOPHILS # BLD AUTO: 0 % (ref 0–1.8)
BASOPHILS # BLD: 0 K/UL (ref 0–0.12)
BUN SERPL-MCNC: 49 MG/DL (ref 8–22)
CALCIUM SERPL-MCNC: 9.2 MG/DL (ref 8.5–10.5)
CHLORIDE SERPL-SCNC: 120 MMOL/L (ref 96–112)
CO2 SERPL-SCNC: 15 MMOL/L (ref 20–33)
CREAT SERPL-MCNC: 4.68 MG/DL (ref 0.5–1.4)
EOSINOPHIL # BLD AUTO: 0.42 K/UL (ref 0–0.51)
EOSINOPHIL NFR BLD: 6.2 % (ref 0–6.9)
ERYTHROCYTE [DISTWIDTH] IN BLOOD BY AUTOMATED COUNT: 45 FL (ref 35.9–50)
GLUCOSE BLD-MCNC: 110 MG/DL (ref 65–99)
GLUCOSE BLD-MCNC: 122 MG/DL (ref 65–99)
GLUCOSE BLD-MCNC: 123 MG/DL (ref 65–99)
GLUCOSE BLD-MCNC: 85 MG/DL (ref 65–99)
GLUCOSE SERPL-MCNC: 81 MG/DL (ref 65–99)
HCT VFR BLD AUTO: 29.5 % (ref 42–52)
HGB BLD-MCNC: 9.5 G/DL (ref 14–18)
LV EJECT FRACT  99904: 45
LV EJECT FRACT MOD 2C 99903: 42.71
LV EJECT FRACT MOD 4C 99902: 51.61
LV EJECT FRACT MOD BP 99901: 45.86
LYMPHOCYTES # BLD AUTO: 1.2 K/UL (ref 1–4.8)
LYMPHOCYTES NFR BLD: 17.9 % (ref 22–41)
MAGNESIUM SERPL-MCNC: 1.6 MG/DL (ref 1.5–2.5)
MANUAL DIFF BLD: NORMAL
MCH RBC QN AUTO: 29.3 PG (ref 27–33)
MCHC RBC AUTO-ENTMCNC: 32.2 G/DL (ref 33.7–35.3)
MCV RBC AUTO: 91 FL (ref 81.4–97.8)
MONOCYTES # BLD AUTO: 0.18 K/UL (ref 0–0.85)
MONOCYTES NFR BLD AUTO: 2.7 % (ref 0–13.4)
MORPHOLOGY BLD-IMP: NORMAL
NEUTROPHILS # BLD AUTO: 4.9 K/UL (ref 1.82–7.42)
NEUTROPHILS NFR BLD: 73.2 % (ref 44–72)
NRBC # BLD AUTO: 0 K/UL
NRBC BLD-RTO: 0 /100 WBC
PLATELET # BLD AUTO: 173 K/UL (ref 164–446)
PLATELET BLD QL SMEAR: NORMAL
PMV BLD AUTO: 11.8 FL (ref 9–12.9)
POTASSIUM SERPL-SCNC: 5.2 MMOL/L (ref 3.6–5.5)
RBC # BLD AUTO: 3.24 M/UL (ref 4.7–6.1)
RBC BLD AUTO: NORMAL
SIGNIFICANT IND 70042: NORMAL
SIGNIFICANT IND 70042: NORMAL
SITE SITE: NORMAL
SITE SITE: NORMAL
SODIUM SERPL-SCNC: 142 MMOL/L (ref 135–145)
SOURCE SOURCE: NORMAL
SOURCE SOURCE: NORMAL
WBC # BLD AUTO: 6.7 K/UL (ref 4.8–10.8)

## 2018-01-30 PROCEDURE — 700111 HCHG RX REV CODE 636 W/ 250 OVERRIDE (IP): Performed by: STUDENT IN AN ORGANIZED HEALTH CARE EDUCATION/TRAINING PROGRAM

## 2018-01-30 PROCEDURE — 97162 PT EVAL MOD COMPLEX 30 MIN: CPT

## 2018-01-30 PROCEDURE — 83735 ASSAY OF MAGNESIUM: CPT

## 2018-01-30 PROCEDURE — A9270 NON-COVERED ITEM OR SERVICE: HCPCS | Performed by: STUDENT IN AN ORGANIZED HEALTH CARE EDUCATION/TRAINING PROGRAM

## 2018-01-30 PROCEDURE — 85027 COMPLETE CBC AUTOMATED: CPT

## 2018-01-30 PROCEDURE — G8978 MOBILITY CURRENT STATUS: HCPCS | Mod: CJ

## 2018-01-30 PROCEDURE — 700102 HCHG RX REV CODE 250 W/ 637 OVERRIDE(OP): Performed by: STUDENT IN AN ORGANIZED HEALTH CARE EDUCATION/TRAINING PROGRAM

## 2018-01-30 PROCEDURE — 36415 COLL VENOUS BLD VENIPUNCTURE: CPT

## 2018-01-30 PROCEDURE — 700102 HCHG RX REV CODE 250 W/ 637 OVERRIDE(OP): Performed by: HOSPITALIST

## 2018-01-30 PROCEDURE — 94660 CPAP INITIATION&MGMT: CPT

## 2018-01-30 PROCEDURE — 82962 GLUCOSE BLOOD TEST: CPT | Mod: 91

## 2018-01-30 PROCEDURE — G8979 MOBILITY GOAL STATUS: HCPCS | Mod: CJ

## 2018-01-30 PROCEDURE — 80048 BASIC METABOLIC PNL TOTAL CA: CPT

## 2018-01-30 PROCEDURE — G8980 MOBILITY D/C STATUS: HCPCS | Mod: CJ

## 2018-01-30 PROCEDURE — 99232 SBSQ HOSP IP/OBS MODERATE 35: CPT | Mod: GC | Performed by: INTERNAL MEDICINE

## 2018-01-30 PROCEDURE — 85007 BL SMEAR W/DIFF WBC COUNT: CPT

## 2018-01-30 PROCEDURE — 93306 TTE W/DOPPLER COMPLETE: CPT

## 2018-01-30 PROCEDURE — 71045 X-RAY EXAM CHEST 1 VIEW: CPT

## 2018-01-30 PROCEDURE — 93306 TTE W/DOPPLER COMPLETE: CPT | Mod: 26 | Performed by: INTERNAL MEDICINE

## 2018-01-30 PROCEDURE — 770020 HCHG ROOM/CARE - TELE (206)

## 2018-01-30 PROCEDURE — A9270 NON-COVERED ITEM OR SERVICE: HCPCS | Performed by: HOSPITALIST

## 2018-01-30 RX ORDER — FUROSEMIDE 10 MG/ML
40 INJECTION INTRAMUSCULAR; INTRAVENOUS
Status: DISCONTINUED | OUTPATIENT
Start: 2018-01-30 | End: 2018-01-30

## 2018-01-30 RX ORDER — MAGNESIUM SULFATE HEPTAHYDRATE 40 MG/ML
2 INJECTION, SOLUTION INTRAVENOUS ONCE
Status: COMPLETED | OUTPATIENT
Start: 2018-01-30 | End: 2018-01-30

## 2018-01-30 RX ADMIN — HYDRALAZINE HYDROCHLORIDE 25 MG: 25 TABLET, FILM COATED ORAL at 20:26

## 2018-01-30 RX ADMIN — MAGNESIUM SULFATE IN WATER 2 G: 40 INJECTION, SOLUTION INTRAVENOUS at 08:23

## 2018-01-30 RX ADMIN — DOXYCYCLINE 100 MG: 100 TABLET ORAL at 20:25

## 2018-01-30 RX ADMIN — INSULIN GLARGINE 15 UNITS: 100 INJECTION, SOLUTION SUBCUTANEOUS at 20:28

## 2018-01-30 RX ADMIN — HYDRALAZINE HYDROCHLORIDE 25 MG: 25 TABLET, FILM COATED ORAL at 15:31

## 2018-01-30 RX ADMIN — DOXYCYCLINE 100 MG: 100 TABLET ORAL at 08:23

## 2018-01-30 RX ADMIN — PREGABALIN 150 MG: 75 CAPSULE ORAL at 20:27

## 2018-01-30 RX ADMIN — LEVOTHYROXINE SODIUM 125 MCG: 125 TABLET ORAL at 08:24

## 2018-01-30 RX ADMIN — METOPROLOL SUCCINATE 150 MG: 50 TABLET, FILM COATED, EXTENDED RELEASE ORAL at 08:24

## 2018-01-30 RX ADMIN — CEFTRIAXONE 2 G: 2 INJECTION, POWDER, FOR SOLUTION INTRAMUSCULAR; INTRAVENOUS at 08:17

## 2018-01-30 RX ADMIN — WARFARIN SODIUM 5 MG: 5 TABLET ORAL at 18:05

## 2018-01-30 RX ADMIN — ASPIRIN 81 MG: 81 TABLET, COATED ORAL at 20:25

## 2018-01-30 RX ADMIN — LOVASTATIN 40 MG: 20 TABLET ORAL at 20:26

## 2018-01-30 RX ADMIN — CALCITRIOL 0.25 MCG: 0.25 CAPSULE, LIQUID FILLED ORAL at 08:23

## 2018-01-30 RX ADMIN — AMLODIPINE BESYLATE 10 MG: 10 TABLET ORAL at 08:24

## 2018-01-30 RX ADMIN — HYDRALAZINE HYDROCHLORIDE 25 MG: 25 TABLET, FILM COATED ORAL at 08:24

## 2018-01-30 ASSESSMENT — ENCOUNTER SYMPTOMS
DIZZINESS: 0
COUGH: 0
DOUBLE VISION: 0
PALPITATIONS: 0
WEAKNESS: 1
DEPRESSION: 0
CHILLS: 0
ABDOMINAL PAIN: 0
SHORTNESS OF BREATH: 1
ORTHOPNEA: 0
SORE THROAT: 0
WHEEZING: 0
FEVER: 0
VOMITING: 0
BLURRED VISION: 0
SENSORY CHANGE: 0
NAUSEA: 0

## 2018-01-30 ASSESSMENT — PAIN SCALES - GENERAL
PAINLEVEL_OUTOF10: 0
PAINLEVEL_OUTOF10: 2
PAINLEVEL_OUTOF10: 0

## 2018-01-30 ASSESSMENT — GAIT ASSESSMENTS
GAIT LEVEL OF ASSIST: CONTACT GUARD ASSIST
DISTANCE (FEET): 200

## 2018-01-30 ASSESSMENT — COGNITIVE AND FUNCTIONAL STATUS - GENERAL
CLIMB 3 TO 5 STEPS WITH RAILING: A LOT
WALKING IN HOSPITAL ROOM: A LITTLE
SUGGESTED CMS G CODE MODIFIER MOBILITY: CJ
MOBILITY SCORE: 21

## 2018-01-30 ASSESSMENT — LIFESTYLE VARIABLES
EVER HAD A DRINK FIRST THING IN THE MORNING TO STEADY YOUR NERVES TO GET RID OF A HANGOVER: NO
TOTAL SCORE: 0
EVER FELT BAD OR GUILTY ABOUT YOUR DRINKING: NO
AVERAGE NUMBER OF DAYS PER WEEK YOU HAVE A DRINK CONTAINING ALCOHOL: 0
DO YOU DRINK ALCOHOL: YES
HAVE YOU EVER FELT YOU SHOULD CUT DOWN ON YOUR DRINKING: NO
CONSUMPTION TOTAL: NEGATIVE
HOW MANY TIMES IN THE PAST YEAR HAVE YOU HAD 5 OR MORE DRINKS IN A DAY: 0
TOTAL SCORE: 0
ON A TYPICAL DAY WHEN YOU DRINK ALCOHOL HOW MANY DRINKS DO YOU HAVE: 0
TOTAL SCORE: 0
HAVE PEOPLE ANNOYED YOU BY CRITICIZING YOUR DRINKING: NO

## 2018-01-30 ASSESSMENT — COPD QUESTIONNAIRES
DO YOU EVER COUGH UP ANY MUCUS OR PHLEGM?: NO/ONLY WITH OCCASIONAL COLDS OR INFECTIONS
DURING THE PAST 4 WEEKS HOW MUCH DID YOU FEEL SHORT OF BREATH: SOME OF THE TIME
COPD SCREENING SCORE: 6
HAVE YOU SMOKED AT LEAST 100 CIGARETTES IN YOUR ENTIRE LIFE: YES

## 2018-01-30 ASSESSMENT — PATIENT HEALTH QUESTIONNAIRE - PHQ9
2. FEELING DOWN, DEPRESSED, IRRITABLE, OR HOPELESS: NOT AT ALL
1. LITTLE INTEREST OR PLEASURE IN DOING THINGS: NOT AT ALL
SUM OF ALL RESPONSES TO PHQ QUESTIONS 1-9: 0
SUM OF ALL RESPONSES TO PHQ9 QUESTIONS 1 AND 2: 0

## 2018-01-30 NOTE — THERAPY
"Physical Therapy Evaluation completed.   Bed Mobility:  Supine to Sit:  (NT, up to chair pre and post )  Transfers: Sit to Stand: Stand by Assist  Gait: Level Of Assist: Contact Guard Assist with No Equipment Needed       Plan of Care: Patient with no further skilled PT needs in the acute care setting at this time  Discharge Recommendations: Equipment: 4-Wheel Walker. Post-acute therapy Discharge to home with outpatient or home health for additional skilled therapy services.    See \"Rehab Therapy-Acute\" Patient Summary Report for complete documentation.     "

## 2018-01-30 NOTE — DISCHARGE PLANNING
Medical Social Worker    KASEY following up with [facility] red team on pt orders.  SW left message on call line.     Plan:  KASEY will continue to follow-up with [facility] red team on pt orders.

## 2018-01-30 NOTE — FACE TO FACE
Face to Face Note  -  Durable Medical Equipment    Ania Roberson M.D. - NPI: [id number]  I certify that this patient is under my care and that they had a durable medical equipment(DME)face to face encounter by myself that meets the physician DME face-to-face encounter requirements with this patient on:    Date of encounter:   Patient:                    MRN:                       YOB: 2020  Efrain Martines  [id number]  1950     The encounter with the patient was in whole, or in part, for the following medical condition, which is the primary reason for durable medical equipment:  CHF    I certify that, based on my findings, the following durable medical equipment is medically necessary:  Walkers, Oxygen    HOME O2 Saturation Measurements:(Values must be present for Home Oxygen orders)  Room air sat at rest: 93  Room air sat with amb: 69  With liters of O2: 1, O2 sat at rest with O2: 95  With Liters of O2: 2, O2 sat with amb with O2 : 91  Is the patient mobile?: Yes    My Clinical findings support the need for the above equipment due to:  Abnormal Gait   Hypoxia    Supporting Symptoms: Patient unsteady with gait, hypoxic during ambulation

## 2018-01-30 NOTE — PROGRESS NOTES
Dr. Coulter updated via phone of BP 160s/80s; per Dr. Coulter continue to monitor, recheck BP in ~ 1 hour.

## 2018-01-30 NOTE — PROGRESS NOTES
Inpatient Anticoagulation Service Note    Date: 1/30/2018  Reason for Anticoagulation: Bioprosthetic Valve Replacement (Aortic valve replacement)   Hemoglobin Value: (!) 9.5  Hematocrit Value: (!) 29.5  Lab Platelet Value: 173  Target INR: 2.0 to 3.0    INR from last 7 days     Date/Time INR Value    01/29/18 0232 (!)  2.09    01/28/18 0416 (!)  2.03    01/27/18 0248 (!)  2.29    01/26/18 0853 (!)  2.41    01/25/18 1950 (!)  2.9        Dose from last 7 days     Date/Time Dose (mg)    01/30/18 1000  5    01/29/18 1100  2.5    01/28/18 1100  5    01/27/18 1600  2.5    01/26/18 1400  2.5    01/26/18 0500  2.5        Average Dose (mg):  (Home dose: 5mg Sun/Tues and 2.5mg AOD per med rec)  Significant Interactions: Antibiotics, Statin, Thyroid Medications, Aspirin  Bridge Therapy: No     Comments: No new INR today. Other labs look good. Doxycycline stops tomorrow, shouldn't really affect INR. Will continue current plan. INR in AM.     Education Material Provided?: No (Chronic warfarin therapy)    Pharmacist suggested discharge dosing: Warfarin 5 mg PO every Sun/Tues and 2.5 mg on all other days with repeat INR within 4-5 days of discharge.      Reid Thakkar, PharmD, BCPS

## 2018-01-30 NOTE — RESPIRATORY CARE
Nocturnal Oximetry    Setup Time: 2230    O2 (LPM): 0    Study End Time: 2235    PATIENT WEARS CPAP UNIT AT NIGHT.    While pt was wearing Noc CPAP, RT removed pt from 1 LPM via Nasal Cannula oxygen therapy.    Pt SpO2 values remained at/above 93% off oxygen therapy while pt was wearing Noc CPAP.    No respiratory distress/discomfort/apnea was noted during this study.

## 2018-01-30 NOTE — PROGRESS NOTES
Internal Medicine Interval Note  Note Author: Ania Roberson M.D.     Name Efrain Martines 1950   Age/Sex 68 y.o. male   MRN [id number]   Code Status Full Code     After 5PM or if no immediate response to page, please call for cross-coverage  Attending/Team: Dr. Minor/Michael See Patient List for primary contact information  Call [phone] to page    1st Call - Day Intern (R1):   Dr. Roberson 2nd Call - Day Sr. Resident (R2/R3):   Dr. Trinidad     Reason for interval visit  (Principal Problem)   Hypoxia      Interval Problem Daily Status Update  (24 hours)   - Patient stable, denies any complaints at this time, continues to require oxygen during the day  - ABG shows no evidence of chronic hypercapnia  - ECHO shows LVEF of 45%, hypokinetic apex  - Continue Rocephin, Azithromycin, Tamiflu (renal dosing) last doses  - Start IV lasix to encourage diuresis   - Nephrology on consult, appreciate rec's      Review of Systems   Constitutional: Negative for chills and fever.   HENT: Negative for congestion and sore throat.    Eyes: Negative for blurred vision and double vision.   Respiratory: Positive for shortness of breath. Negative for cough and wheezing.    Cardiovascular: Negative for chest pain, palpitations and orthopnea.   Gastrointestinal: Negative for abdominal pain, nausea and vomiting.   Genitourinary: Negative for dysuria and urgency.   Musculoskeletal: Negative for joint pain.   Skin: Negative for rash.   Neurological: Positive for weakness. Negative for dizziness and sensory change.   Psychiatric/Behavioral: Negative for depression.       Consultants/Specialty  None    Disposition  Inpatient requiring IV abx, resolution of hypoxia    Quality Measures    Reviewed items::  EKG reviewed, Radiology images reviewed, Labs reviewed and Medications reviewed  Leach catheter::  No Leach  : No central line.  DVT prophylaxis pharmacological::  Warfarin (Coumadin)  DVT prophylaxis - mechanical:   SCDs  Ulcer Prophylaxis::  No  Antibiotics:  Treating active infection/contamination beyond 24 hours perioperative coverage      Physical Exam     Vitals:    01/30/18 0200 01/30/18 0325 01/30/18 0442 01/30/18 0803   BP:  (!) 166/82 143/69 (!) 173/83   Pulse:  75  67   Resp: 18 16 18   Temp:  37.1 °C (98.8 °F)  36.9 °C (98.4 °F)   SpO2:  94%  97%   Weight:       Height:         Body mass index is 29.52 kg/m². Weight: 96 kg (211 lb 10.3 oz)  Oxygen Therapy:  Pulse Oximetry: 97 %, O2 (LPM): 1, O2 Delivery: Silicone Nasal Cannula      Physical Exam   Constitutional: He is oriented to person, place, and time and well-developed, well-nourished, and in no distress.   HENT:   Head: Normocephalic and atraumatic.   Eyes: Conjunctivae are normal. Pupils are equal, round, and reactive to light. No scleral icterus.   Neck: Normal range of motion. Neck supple. No JVD present.   Cardiovascular: Normal rate, regular rhythm and normal heart sounds.    No murmur heard.  Pulmonary/Chest: Effort normal and breath sounds normal. No respiratory distress. He has no wheezes. He has no rales.   Abdominal: Soft. Bowel sounds are normal. He exhibits no distension. There is no tenderness.   Musculoskeletal: Normal range of motion. He exhibits no edema.   Lymphadenopathy:     He has no cervical adenopathy.   Neurological: He is alert and oriented to person, place, and time. No cranial nerve deficit.   Skin: No rash noted. No erythema. No pallor.   Psychiatric: Affect normal.       Lab Data Review:   1/26/2018  1:45 PM    Recent Labs      01/28/18   0416  01/28/18   0823  01/29/18   0232  01/30/18   0314   SODIUM   --   141  139  142   POTASSIUM   --   5.2  4.9  5.2   CHLORIDE   --   115*  116*  120*   CO2   --   17*  16*  15*   BUN   --   60*  58*  49*   CREATININE   --   5.50*  5.15*  4.68*   MAGNESIUM  1.9   --   1.9  1.6   CALCIUM   --   9.6  9.3  9.2       Recent Labs      01/28/18   0823  01/29/18   0232  01/30/18   0314   GLUCOSE  125*   111*  81       Recent Labs      01/28/18   0416  01/28/18   0823  01/29/18   0231  01/29/18   0232  01/30/18   0314   RBC   --   3.64*  3.42*   --   3.24*   HEMOGLOBIN   --   10.3*  10.2*   --   9.5*   HEMATOCRIT   --   33.8*  31.3*   --   29.5*   PLATELETCT   --   204  183   --   173   PROTHROMBTM  22.6*   --    --   23.2*   --    INR  2.03*   --    --   2.09*   --        Recent Labs      01/28/18   0823  01/29/18   0231  01/30/18   0314   WBC  7.0  8.1  6.7   NEUTSPOLYS  59.40  62.20  73.20*   LYMPHOCYTES  22.90  20.80*  17.90*   MONOCYTES  9.50  8.00  2.70   EOSINOPHILS  7.50*  8.00*  6.20   BASOPHILS  0.30  0.40  0.00         Assessment/Plan     * Acute hypoxic respiratory failure - (present on admission)   Assessment & Plan    Likely secondary to influenza A infection with possible superimposed community acquired pneumonia. Chest x-ray shows bibasilar infiltrates. However, white count was normal on admission and patient was afebrile. Blood cultures were negative. Pro-calcitonin was 0.26, elevated.    - blood cultures show NGTD  - continue to wean oxygen as tolerated  - ECHO shows LVEF of 45%, hypokinetic apex  - ABG shows pH of 7.36, pCO2 of 26, HCO3 of 15, no evidence of chronic hypercapnia however possibly compensated due to chronically elevated BUN due to kidney failure  - continue tamiflu, rocephin, doxy - last doses  - rt protocol, continuous O2, IS  - continue diuresis         Sepsis (CMS-HCC)- (present on admission)   Assessment & Plan    Resolved.  On admission, 2/4 SIRS, 0/3 qSOFA on admit. Influenza positive with possible superimposed CAP. Procalcitonin elevated on admit. Blood cultures show NGTD    Plan:  -Continue to hold furosemide.  -Continue Tamiflu, ceftriaxone, and doxycycline.        ESRD (end stage renal disease) (CMS-HCC)- (present on admission)   Assessment & Plan    Creatinine 5.66 on admission. CK disease stage IV nearing dialysis.    Plan:  -Avoid nephrotoxic medications.  -Continue to  monitor ins and outs and daily weights.  -Continue to hold furosemide and metolazone  -Continue calcitriol  - Vein mapping completed on 1/29  - Nephrology on consult, appreciate rec's        Acute on chronic systolic heart failure (CMS-HCC)- (present on admission)   Assessment & Plan    - History of CAD, CABG, AVR  - ECHO on 1/30 shows LVEF of 45%, hypokinetic apex  - Remains hypoxic during ambulation, possible fluid overload vs. Resolving PNA  - Continue BB, ASA, hold ACE/ARB due to transient hyperK  - Start IV lasix        Hematuria- (present on admission)   Assessment & Plan    Patient has [facility] 2+ [facility] renal negative on admission.    Plan:   - Consider urology consult as outpatient.        Diabetes (CMS-HCC)- (present on admission)   Assessment & Plan    Patient checks blood glucose weekly and are normally < 200's. Home regimen includes Lantus 45 units at bedtime. Hemoglobin A1c was 6.2 on November 2017 and 6.6 on January 26, 2018.    Plan;  -Continue home Lantus at 15 units at bedtime.  -Continue regular insulin sliding scale.   -Continue hypoglycemia protocol.        Hypertension- (present on admission)   Assessment & Plan    -170s/ 60-80s. Was previously on [facility]artan but [facility]    Plan:   -Continue home amlodipine 10 mg  -Continue home hydralazine 25 mg   -Continue home metoprolol  mg per mouth daily        Anemia- (present on admission)   Assessment & Plan    Likely anemia of chronic disease secondary to end-stage renal disease.  Ferritin 43.1, iron low at 36, TIBC 262, %saturation 14, vitamin B-12 370, folate> 235.    Plan:  -Consider erythropoietin on outpatient basis.        Renal osteodystrophy- (present on admission)   Assessment & Plan    Low vitamin D level and elevated PTH with phosphate less than 10. Likely due to renal disease.    Plan:  -Continue home calcitriol 0.25 µg daily        Complete heart block (CMS-HCC)- (present on  admission)   Assessment & Plan    - History of complete heart block secondary s/p pacemaker placement in 2007  - Echocardiogram in 11/2016 showed EF 50-55% with normal left ventricular size and function.  - Repeat ECHO pending        Hepatitis C- (present on admission)   Assessment & Plan    History of hepatitis B infection status post her bony treatment. Hepatitis B negative.         H/O aortic valve replacement- (present on admission)   Assessment & Plan    History of prosthetic aortic valve replacement in 1993. INR was 2.9 on admission. Home regimen of warfarin 5 mg on Tuesdays and Thursdays and 2.5 mg all other days.    Plan:  -Continue warfarin dosing per pharmacy.        History of mechanical fall without injury- (present on admission)   Assessment & Plan    Status post carrying case of water and losing balance due to how heavy it was. Head CT was negative. No rib fractures on chest x-ray. 0 right rib contusion.    Plan:  -Continue Tylenol as needed for pain.        Obstructive sleep apnea- (present on admission)   Assessment & Plan    History of obstructive sleep apnea complying with CPAP at night.    Plan:  -Continue CPAP per RT at night.        Hypothyroidism- (present on admission)   Assessment & Plan    TSH 4.22 on 11/2017.    Plan:  -Continue home levothyroxine 1.5 µg mouth daily.        Hyperlipidemia- (present on admission)   Assessment & Plan    Total cholesterol 145, HDL 19, LDL 85. ASCVD risk 29.7% in 10 years.    Plan:  -Continue home lovastatin 40 mg by mouth daily.            Ania Roberson M.D.

## 2018-01-30 NOTE — ASSESSMENT & PLAN NOTE
- History of CAD, CABG, AVR  - ECHO on 1/30 shows LVEF of 45%, hypokinetic apex  - Remains hypoxic during ambulation, possible fluid overload vs. Resolving PNA  - Continue BB, ASA, hold ACE/ARB due to transient hyperK  - Start IV lasix

## 2018-01-30 NOTE — CARE PLAN
Problem: Communication  Goal: The ability to communicate needs accurately and effectively will improve  Outcome: PROGRESSING AS EXPECTED  Pt using call light appropriately; pt communicating needs effectively to staff; pt receptive of care plan and understanding hospital processes.

## 2018-01-30 NOTE — DISCHARGE PLANNING
Medical Social Worker    KASEY communicated with [facility] team on the need for an order to with the  face to face on oxygen.  [facility] team reports they will attach an order.    Plan:  SW will receive choice on oxygen.  SW will continue to assist with pt's needs.

## 2018-01-30 NOTE — PROGRESS NOTES
Report received from NANY Valente. Assumed care of patient at 1845. Telemetry functioning properly. Fall precautions in place. Will continue to monitor.

## 2018-01-30 NOTE — CONSULTS
DATE OF SERVICE:  01/30/2018    This is nephrology consultation for evaluation and management of chronic   kidney disease.    REQUESTING PHYSICIAN:  From [facility].    HISTORY OF PRESENT ILLNESS:  The patient is a pleasant 68-year-old gentleman   with history of chronic kidney disease stage V, status post left nephrectomy   [facility], has   also history of diabetes, hypertension, hepatitis C, hypothyroidism,   obstructive sleep apnea with CPAP, history of aortic valve replacement, on   warfarin, presented to [facility] on January 25th with   complaints of generalized weakness status post fall with some bruising on the   right side of the chest.  Initially in the emergency room, patient was   saturating over 90% on 2 L nasal cannula.  He does not use oxygen at home.    His chest x-ray at that time showed bilateral opacities in the lung bases and   he was also found to be influenza A positive.  Hence, he was admitted for   treatment of possible pulmonary edema and the flu versus possible   community-acquired pneumonia.  His serum creatinine on admission was 5.66 with   a potassium of 5.5 and over the course of his stay, he was treated with   diuretics.  His creatinine today is 4.68, potassium 5.2, bicarbonate was 15.    Nephrology was hence called to evaluate him for his chronic kidney disease.    He has a GFR around 15 and is followed [facility].    He was in preparation for a fistula creation in anticipation for dialysis in   the near future.  He is currently nonoliguric.  He has diarrhea for the past   3-4 days going about 3-4 times a day, watery in nature.  He is still receiving   Lasix and potassium.    PAST MEDICAL HISTORY:  Significant for diabetes, hypertension, hyperlipidemia,   cerebrovascular accident, history of cerebral aneurysm, left nephrectomy,   chronic hepatitis C, chronic  kidney disease stage IV, borderline V;   hyperlipidemia, hypothyroidism, obstructive sleep apnea and uses CPAP.  He has   a history of aortic valve replacement, mitral valve replacement.  He is on   chronic warfarin therapy and history of peripheral neuropathy.    CURRENT MEDICATIONS:  Include Norvasc, Ecotrin, Rocaltrol, hydralazine, Lantus   insulin, Synthroid, Mevacor, Coumadin, Toprol, Lyrica, and Coumadin.    REVIEW OF SYSTEMS:  PULMONARY:  He does have shortness of breath, but he says it significantly   improved since his admission.  CARDIOVASCULAR:  No pedal edema, no orthopnea, no chest pain.  GASTROINTESTINAL:  No nausea or vomiting.  He does have diarrhea, watery in   nature.  No constipation.  NEUROLOGIC:  No dizziness, syncope, or generalized weakness.  SKIN:  With no evidence of rash, pruritus.  All other systems reviewed and   negative.    SOCIAL HISTORY:  He is , was a 20-pack-year smoker, quit in 1993, does   not drink alcohol or use IV drugs.    FAMILY HISTORY:  Not contributory for kidney disease.    ALLERGIES:  No known drug allergies noted.    PHYSICAL EXAMINATION:  VITAL SIGNS:  On examination, vitals revealing a blood pressure of 173/83 with   a pulse of 67, temperature 36.9.  GENERAL:  He is lying in bed, no major hemodynamic distress.  HEENT:  Normocephalic, atraumatic.  NECK:  Supple, no JVD, no thyromegaly.  CHEST:  Clear bilaterally.  CARDIOVASCULAR:  S1, S2 heard.  ABDOMEN:  Soft, nontender, nondistended.  Bowel sounds are present.  EXTREMITIES:  Without any evidence of cyanosis or clubbing.  He does have   pedal edema on the left lower extremity, which is chronic.  No edema on the   right.  SKIN:  With no evidence of rash, pruritus or wounds.    LABORATORY DATA:  His white count is 6.7 with hemoglobin 9.5, platelet count   173.  His chemistry shows a sodium of 142, potassium 5.2, 120 chloride, 15   bicarbonate.  BUN and creatinine of 49/4.68.  He had an echocardiogram today    showing a left ventricular ejection fraction of 45%, hypokinetic apex,   mild-to-moderate mitral regurg.  His last x-ray on January 26th shows stable   interstitial edema and bibasilar atelectasis and stable cardiomegaly.    ASSESSMENT AND PLAN:  1.  Chronic kidney disease stage V, has improved since admission somewhat.    Right now, there is no acute need to start hemodialysis.  We will continue to   watch him.  He did get ultrasound vein mapping yesterday.  Since he is here   might as well call vascular surgery, [facility], Dr. Andrews or Dr. Evans whoever is available to please create a fistula.  We will likely start   him on hemodialysis as an outpatient.  Place him on a renal low sodium   diabetic diet, avoid nephrotoxins, and renal dose medications as necessary.  I   would stop the Lasix for now and the potassium pills given he is mildly   hyperkalemic consistently over the past several days, given he has diarrhea as   well and his lungs are clear.  He will likely not need diuretics for now.  2.  Shortness of breath.  He has had this exertional dyspnea for years, not on   home O2.  He likely will require oxygen as an outpatient on a chronic basis   even if he starts dialysis.  3.  Acidosis secondary to his renal failure.  I would start him on some sodium   bicarbonate t.i.d.  4.  Hyperkalemia, mild.  Stop his potassium pills.  5.  Anemia likely secondary to his renal failure.  Check iron status.  We have   to see if he requires iron supplement/IV iron.  6.  Renal osteodystrophy.  Check PTH and vitamin D.  7.  Influenza A.  8.  Possible community-acquired pneumonia.  9.  Diabetes.  10.  Hypertension.  Continue antihypertensives for now.  11.  Hypothyroidism.  12.  Hyperlipidemia.  13.  Obstructive sleep apnea, continue continuous positive airway pressure.    As mentioned above, no plans for dialysis.  At this time, there is no urgent   need.  There is no uremic.  He is not volume overloaded at this  time.  We will   continue to watch him.  As mentioned above, we would get vascular surgery to   create AV fistula during his stay here.  Otherwise, this could be done as an   outpatient.    Thank you for allowing us to care for the patient.  We will follow him closely   with you.       ____________________________________     MD MILTON GALLARDO / SALVATORE    DD:  01/30/2018 12:27:53  DT:  01/30/2018 14:17:18    D#:  [id number]  Job#:  [id number]

## 2018-01-31 VITALS
RESPIRATION RATE: 19 BRPM | HEIGHT: 71 IN | WEIGHT: 216.71 LBS | DIASTOLIC BLOOD PRESSURE: 77 MMHG | BODY MASS INDEX: 30.34 KG/M2 | SYSTOLIC BLOOD PRESSURE: 149 MMHG | TEMPERATURE: 98 F | OXYGEN SATURATION: 96 % | HEART RATE: 61 BPM

## 2018-01-31 PROBLEM — R09.02 HYPOXIA: Status: RESOLVED | Noted: 2018-01-26 | Resolved: 2018-01-31

## 2018-01-31 PROBLEM — A41.9 SEPSIS (HCC): Status: RESOLVED | Noted: 2018-01-26 | Resolved: 2018-01-31

## 2018-01-31 LAB
ANION GAP SERPL CALC-SCNC: 8 MMOL/L (ref 0–11.9)
BUN SERPL-MCNC: 45 MG/DL (ref 8–22)
CALCIUM SERPL-MCNC: 8.4 MG/DL (ref 8.5–10.5)
CHLORIDE SERPL-SCNC: 120 MMOL/L (ref 96–112)
CO2 SERPL-SCNC: 15 MMOL/L (ref 20–33)
CREAT SERPL-MCNC: 4.14 MG/DL (ref 0.5–1.4)
GLUCOSE BLD-MCNC: 133 MG/DL (ref 65–99)
GLUCOSE BLD-MCNC: 88 MG/DL (ref 65–99)
GLUCOSE SERPL-MCNC: 93 MG/DL (ref 65–99)
INR PPP: 2.54 (ref 0.87–1.13)
MAGNESIUM SERPL-MCNC: 1.7 MG/DL (ref 1.5–2.5)
POTASSIUM SERPL-SCNC: 5.1 MMOL/L (ref 3.6–5.5)
PROTHROMBIN TIME: 27 SEC (ref 12–14.6)
SODIUM SERPL-SCNC: 143 MMOL/L (ref 135–145)

## 2018-01-31 PROCEDURE — 85610 PROTHROMBIN TIME: CPT

## 2018-01-31 PROCEDURE — 700111 HCHG RX REV CODE 636 W/ 250 OVERRIDE (IP): Performed by: STUDENT IN AN ORGANIZED HEALTH CARE EDUCATION/TRAINING PROGRAM

## 2018-01-31 PROCEDURE — 82962 GLUCOSE BLOOD TEST: CPT

## 2018-01-31 PROCEDURE — 99239 HOSP IP/OBS DSCHRG MGMT >30: CPT | Mod: GC | Performed by: INTERNAL MEDICINE

## 2018-01-31 PROCEDURE — 36415 COLL VENOUS BLD VENIPUNCTURE: CPT

## 2018-01-31 PROCEDURE — A9270 NON-COVERED ITEM OR SERVICE: HCPCS | Performed by: STUDENT IN AN ORGANIZED HEALTH CARE EDUCATION/TRAINING PROGRAM

## 2018-01-31 PROCEDURE — 700102 HCHG RX REV CODE 250 W/ 637 OVERRIDE(OP): Performed by: STUDENT IN AN ORGANIZED HEALTH CARE EDUCATION/TRAINING PROGRAM

## 2018-01-31 PROCEDURE — A9270 NON-COVERED ITEM OR SERVICE: HCPCS | Performed by: HOSPITALIST

## 2018-01-31 PROCEDURE — 83735 ASSAY OF MAGNESIUM: CPT

## 2018-01-31 PROCEDURE — 700102 HCHG RX REV CODE 250 W/ 637 OVERRIDE(OP): Performed by: HOSPITALIST

## 2018-01-31 PROCEDURE — 80048 BASIC METABOLIC PNL TOTAL CA: CPT

## 2018-01-31 RX ORDER — FUROSEMIDE 40 MG/1
40 TABLET ORAL EVERY MORNING
Qty: 60 TAB | Refills: 1 | Status: SHIPPED | OUTPATIENT
Start: 2018-01-31 | End: 2019-01-01

## 2018-01-31 RX ORDER — FUROSEMIDE 10 MG/ML
40 INJECTION INTRAMUSCULAR; INTRAVENOUS
Status: DISCONTINUED | OUTPATIENT
Start: 2018-01-31 | End: 2018-01-31

## 2018-01-31 RX ORDER — SODIUM BICARBONATE 650 MG/1
650 TABLET ORAL 3 TIMES DAILY
Qty: 90 TAB | Refills: 3 | Status: SHIPPED | OUTPATIENT
Start: 2018-01-31 | End: 2019-01-01

## 2018-01-31 RX ORDER — SODIUM BICARBONATE 650 MG/1
650 TABLET ORAL 3 TIMES DAILY
Status: DISCONTINUED | OUTPATIENT
Start: 2018-01-31 | End: 2018-01-31 | Stop reason: HOSPADM

## 2018-01-31 RX ADMIN — FUROSEMIDE 40 MG: 10 INJECTION, SOLUTION INTRAVENOUS at 07:24

## 2018-01-31 RX ADMIN — METOPROLOL SUCCINATE 150 MG: 50 TABLET, FILM COATED, EXTENDED RELEASE ORAL at 09:11

## 2018-01-31 RX ADMIN — CALCITRIOL 0.25 MCG: 0.25 CAPSULE, LIQUID FILLED ORAL at 09:12

## 2018-01-31 RX ADMIN — WARFARIN SODIUM 2.5 MG: 2.5 TABLET ORAL at 16:08

## 2018-01-31 RX ADMIN — AMLODIPINE BESYLATE 10 MG: 10 TABLET ORAL at 09:00

## 2018-01-31 RX ADMIN — HYDRALAZINE HYDROCHLORIDE 25 MG: 25 TABLET, FILM COATED ORAL at 09:14

## 2018-01-31 RX ADMIN — SODIUM BICARBONATE 650 MG: 650 TABLET ORAL at 16:07

## 2018-01-31 RX ADMIN — LEVOTHYROXINE SODIUM 125 MCG: 125 TABLET ORAL at 09:14

## 2018-01-31 RX ADMIN — HYDRALAZINE HYDROCHLORIDE 25 MG: 25 TABLET, FILM COATED ORAL at 16:07

## 2018-01-31 ASSESSMENT — ENCOUNTER SYMPTOMS
PSYCHIATRIC NEGATIVE: 1
NEUROLOGICAL NEGATIVE: 1
CONSTITUTIONAL NEGATIVE: 1
ORTHOPNEA: 1
SHORTNESS OF BREATH: 1
DIARRHEA: 1
MUSCULOSKELETAL NEGATIVE: 1
EYES NEGATIVE: 1

## 2018-01-31 ASSESSMENT — PAIN SCALES - GENERAL: PAINLEVEL_OUTOF10: 0

## 2018-01-31 ASSESSMENT — LIFESTYLE VARIABLES
HOW MANY TIMES IN THE PAST YEAR HAVE YOU HAD 5 OR MORE DRINKS IN A DAY: 0
HAVE PEOPLE ANNOYED YOU BY CRITICIZING YOUR DRINKING: NO
TOTAL SCORE: 0
DO YOU DRINK ALCOHOL: YES
EVER HAD A DRINK FIRST THING IN THE MORNING TO STEADY YOUR NERVES TO GET RID OF A HANGOVER: NO
TOTAL SCORE: 0
AVERAGE NUMBER OF DAYS PER WEEK YOU HAVE A DRINK CONTAINING ALCOHOL: 0
ON A TYPICAL DAY WHEN YOU DRINK ALCOHOL HOW MANY DRINKS DO YOU HAVE: 0
HAVE YOU EVER FELT YOU SHOULD CUT DOWN ON YOUR DRINKING: NO
EVER_SMOKED: NEVER
CONSUMPTION TOTAL: NEGATIVE
EVER FELT BAD OR GUILTY ABOUT YOUR DRINKING: NO
TOTAL SCORE: 0

## 2018-01-31 NOTE — DISCHARGE PLANNING
Medical Social Worker    KASEY followed-up on O2 order with the [id number].  Cyn at the Home [facility], reported his O2 should be sent to the hospital around 12:30pm today.     Plan:  KASEY will continue to follow-up on pt's referrals and assist with pt's needs.

## 2018-01-31 NOTE — DISCHARGE PLANNING
Medical Social Worker    KASEY met with pt to discuss walker and find someone to  [facility].  Pt reports his wife is unable to  walker till 8:00am tomorrow.  KASEY communicated this with [facility] doctor and bedside nurse.  [facility] doctor reported this was okay.  Bedside nurse reports pt will need to be wheel chaired down to car at d/c.      KASEY [facility] nurse [facility] and left a message on this updated information on walker pick-up tomorrow.      Pt has O2 in room.  SW and CCS are waiting for follow-up on [facility].     Plan:  SW will continue to assist with pt's needs.

## 2018-01-31 NOTE — DISCHARGE PLANNING
Care Transition Team Assessment    SW met with pt at bedside.  Pt reports his spouse is his support system.  [facility]  Pt reports his PCP is Dr. Villagran at the [facility].  Pt reports he utilizes a cane.  Pt reports he will be getting 02 and communicated his need to use this according to his doctor's orders.      Information Source  Orientation : Oriented x 4  Information Given By: Patient  Who is responsible for making decisions for patient? : Patient    Elopement Risk  Legal Hold: No  Ambulatory or Self Mobile in Wheelchair: Yes  Disoriented: No  Psychiatric Symptoms: None  History of Wandering: No  Elopement this Admit: No  Vocalizing Wanting to Leave: No  Displays Behaviors, Body Language Wanting to Leave: No-Not at Risk for Elopement  Elopement Risk: Not at Risk for Elopement    Interdisciplinary Discharge Planning  Does Admitting Nurse Feel This Could be a Complex Discharge?: No  Primary Care Physician: Dr. Villagran  Lives with - Patient's Self Care Capacity: Spouse  Patient or legal guardian wants to designate a caregiver (see row info): No  Support Systems: Spouse / Significant Other  Housing / Facility: [address]  Do You Take your Prescribed Medications Regularly: Yes  Able to Return to Previous ADL's: Future Time w/Therapy  Mobility Issues: Yes  Prior Services: Home-Independent  Patient Expects to be Discharged to:: home  Assistance Needed: Unknown at this Time  Durable Medical Equipment: Not Applicable    Discharge Preparedness  What is your plan after discharge?: Home with help  What are your discharge supports?: Spouse  Prior Functional Level: Uses Cane    Functional Assesment  Prior Functional Level: Uses Cane    Finances  Financial Barriers to Discharge: No  Prescription Coverage: Yes    Vision / Hearing Impairment  Vision Impairment : Yes  Right Eye Vision: Impaired, Wears Glasses  Left Eye Vision: Impaired, Wears Glasses  Hearing Impairment : No    Values /  Beliefs / Concerns  Values / Beliefs Concerns : No         Domestic Abuse  Have you ever been the victim of abuse or violence?: No  Physical Abuse or Sexual Abuse: No  Verbal Abuse or Emotional Abuse: No         Discharge Risks or Barriers  Discharge risks or barriers?: No    Anticipated Discharge Information  Anticipated discharge disposition: DME, HHC, Home, Oxygen

## 2018-01-31 NOTE — DISCHARGE PLANNING
Medical Social Worker    KASEY met with pt to sign choice on HH and DME.  [facility] to [phone].  KASEY faxed over HH to Jenn, [phone].  KASEY called [facility] sussy to assist with [facility] needs and she provided pt's nurse and doctor's contact information.  [facility] nurse, [phone]. [phone].      KASEY communicated with [facility]'s nurse, [facility], and she reported she could assist with the walker and shared pt's spouse could  and bring to hospital.  Aleah reported she would need additional documentation for oxygen and Hh. KASEY will call Aleah back if her assistance is needed for O2 and Hh.      Plan:  KASEY will assist with pt's needs.

## 2018-01-31 NOTE — DISCHARGE INSTRUCTIONS
Discharge Instructions    Discharged to home by car with relative. Discharged via wheelchair, hospital escort: Yes.  Special equipment needed: Oxygen and Walker    Be sure to schedule a follow-up appointment with your primary care doctor or any specialists as instructed.     Discharge Plan:   Diet Plan: Discussed  Activity Level: Discussed  Confirmed Symptoms Management: Discussed  Medication Reconciliation Updated: Yes  Pneumococcal Vaccine Given - only chart on this line when given:  (Cancelled by pharmacy)  Influenza Vaccine Indication: Patient Refuses    I understand that a diet low in cholesterol, fat, and sodium is recommended for good health. Unless I have been given specific instructions below for another diet, I accept this instruction as my diet prescription.   Other diet: Diabetic (low potassium and low phosphorus)    Special Instructions: None    · Is patient discharged on Warfarin / Coumadin?   Yes    You are receiving the drug warfarin. Please understand the importance of monitoring warfarin with scheduled PT/INR blood draws.  Follow-up with a call to your personal Doctor's office in 3 days to schedule a PT/INR. .    IMPORTANT: HOW TO USE THIS INFORMATION:  This is a summary and does NOT have all possible information about this product. This information does not assure that this product is safe, effective, or appropriate for you. This information is not individual medical advice and does not substitute for the advice of your health care professional. Always ask your health care professional for complete information about this product and your specific health needs.      WARFARIN - ORAL (WARF-uh-rin)      COMMON BRAND NAME(S): Coumadin      WARNING:  Warfarin can cause very serious (possibly fatal) bleeding. This is more likely to occur when you first start taking this medication or if you take too much warfarin. To decrease your risk for bleeding, your doctor or other health care provider will monitor  "you closely and check your lab results (INR test) to make sure you are not taking too much warfarin. Keep all medical and laboratory appointments. Tell your doctor right away if you notice any signs of serious bleeding. See also Side Effects section.      USES:  This medication is used to treat blood clots (such as in deep vein thrombosis-DVT or pulmonary embolus-PE) and/or to prevent new clots from forming in your body. Preventing harmful blood clots helps to reduce the risk of a stroke or heart attack. Conditions that increase your risk of developing blood clots include a certain type of irregular heart rhythm (atrial fibrillation), heart valve replacement, recent heart attack, and certain surgeries (such as hip/knee replacement). Warfarin is commonly called a \"blood thinner,\" but the more correct term is \"anticoagulant.\" It helps to keep blood flowing smoothly in your body by decreasing the amount of certain substances (clotting proteins) in your blood.      HOW TO USE:  Read the Medication Guide provided by your pharmacist before you start taking warfarin and each time you get a refill. If you have any questions, ask your doctor or pharmacist. Take this medication by mouth with or without food as directed by your doctor or other health care professional, usually once a day. It is very important to take it exactly as directed. Do not increase the dose, take it more frequently, or stop using it unless directed by your doctor. Dosage is based on your medical condition, laboratory tests (such as INR), and response to treatment. Your doctor or other health care provider will monitor you closely while you are taking this medication to determine the right dose for you. Use this medication regularly to get the most benefit from it. To help you remember, take it at the same time each day. It is important to eat a balanced, consistent diet while taking warfarin. Some foods can affect how warfarin works in your body and " may affect your treatment and dose. Avoid sudden large increases or decreases in your intake of foods high in vitamin K (such as broccoli, cauliflower, cabbage, brussels sprouts, kale, spinach, and other green leafy vegetables, liver, green tea, certain vitamin supplements). If you are trying to lose weight, check with your doctor before you try to go on a diet. Cranberry products may also affect how your warfarin works. Limit the amount of cranberry juice (16 ounces/480 milliliters a day) or other cranberry products you may drink or eat.      SIDE EFFECTS:  Nausea, loss of appetite, or stomach/abdominal pain may occur. If any of these effects persist or worsen, tell your doctor or pharmacist promptly. Remember that your doctor has prescribed this medication because he or she has judged that the benefit to you is greater than the risk of side effects. Many people using this medication do not have serious side effects. This medication can cause serious bleeding if it affects your blood clotting proteins too much (shown by unusually high INR lab results). Even if your doctor stops your medication, this risk of bleeding can continue for up to a week. Tell your doctor right away if you have any signs of serious bleeding, including: unusual pain/swelling/discomfort, unusual/easy bruising, prolonged bleeding from cuts or gums, persistent/frequent nosebleeds, unusually heavy/prolonged menstrual flow, pink/dark urine, coughing up blood, vomit that is bloody or looks like coffee grounds, severe headache, dizziness/fainting, unusual or persistent tiredness/weakness, bloody/black/tarry stools, chest pain, shortness of breath, difficulty swallowing. Tell your doctor right away if any of these unlikely but serious side effects occur: persistent nausea/vomiting, severe stomach/abdominal pain, yellowing eyes/skin. This drug rarely has caused very serious (possibly fatal) problems if its effects lead to small blood clots (usually  at the beginning of treatment). This can lead to severe skin/tissue damage that may require surgery or amputation if left untreated. Patients with certain blood conditions (protein C or S deficiency) may be at greater risk. Get medical help right away if any of these rare but serious side effects occur: painful/red/purplish patches on the skin (such as on the toe, breast, abdomen), change in the amount of urine, vision changes, confusion, slurred speech, weakness on one side of the body. A very serious allergic reaction to this drug is rare. However, get medical help right away if you notice any symptoms of a serious allergic reaction, including: rash, itching/swelling (especially of the face/tongue/throat), severe dizziness, trouble breathing. This is not a complete list of possible side effects. If you notice other effects not listed above, contact your doctor or pharmacist. In the [place] - Call your doctor for medical advice about side effects. You may report side effects to FDA at 9-046-RHE-3360. In [place] - Call your doctor for medical advice about side effects. You may report side effects to [facility] [place] at 1-468.994.6715.      PRECAUTIONS:  Before taking warfarin, tell your doctor or pharmacist if you are allergic to it; or if you have any other allergies. This product may contain inactive ingredients, which can cause allergic reactions or other problems. Talk to your pharmacist for more details. Before using this medication, tell your doctor or pharmacist your medical history, especially of: blood disorders (such as anemia, hemophilia), bleeding problems (such as bleeding of the stomach/intestines, bleeding in the brain), blood vessel disorders (such as aneurysms), recent major injury/surgery, liver disease, alcohol use, mental/mood disorders (including memory problems), frequent falls/injuries. It is important that all your doctors and dentists know that you take warfarin. Before having surgery or any  medical/dental procedures, tell your doctor or dentist that you are taking this medication and about all the products you use (including prescription drugs, nonprescription drugs, and herbal products). Avoid getting injections into the muscles. If you must have an injection into a muscle (for example, a flu shot), it should be given in the arm. This way, it will be easier to check for bleeding and/or apply pressure bandages. This medication may cause stomach bleeding. Daily use of alcohol while using this medicine will increase your risk for stomach bleeding and may also affect how this medication works. Limit or avoid alcoholic beverages. If you have not been eating well, if you have an illness or infection that causes fever, vomiting, or diarrhea for more than 2 days, or if you start using any antibiotic medications, contact your doctor or pharmacist immediately because these conditions can affect how warfarin works. This medication can cause heavy bleeding. To lower the chance of getting cut, bruised, or injured, use great caution with sharp objects like safety razors and nail cutters. Use an electric razor when shaving and a soft toothbrush when brushing your teeth. Avoid activities such as contact sports. If you fall or injure yourself, especially if you hit your head, call your doctor immediately. Your doctor may need to check you. The Food & Drug Administration has stated that generic warfarin products are interchangeable. However, consult your doctor or pharmacist before switching warfarin products. Be careful not to take more than one medication that contains warfarin unless specifically directed by the doctor or health care provider who is monitoring your warfarin treatment. Older adults may be at greater risk for bleeding while using this drug. This medication is not recommended for use during pregnancy because of serious (possibly fatal) harm to an unborn baby. Discuss the use of reliable forms of birth  "control with your doctor. If you become pregnant or think you may be pregnant, tell your doctor immediately. If you are planning pregnancy, discuss a plan for managing your condition with your doctor before you become pregnant. Your doctor may switch the type of medication you use during pregnancy. Very small amounts of this medication may pass into breast milk but is unlikely to harm a nursing infant. Consult your doctor before breast-feeding.      DRUG INTERACTIONS:  Drug interactions may change how your medications work or increase your risk for serious side effects. This document does not contain all possible drug interactions. Keep a list of all the products you use (including prescription/nonprescription drugs and herbal products) and share it with your doctor and pharmacist. Do not start, stop, or change the dosage of any medicines without your doctor's approval. Warfarin interacts with many prescription, nonprescription, vitamin, and herbal products. This includes medications that are applied to the skin or inside the vagina or rectum. The interactions with warfarin usually result in an increase or decrease in the \"blood-thinning\" (anticoagulant) effect. Your doctor or other health care professional should closely monitor you to prevent serious bleeding or clotting problems. While taking warfarin, it is very important to tell your doctor or pharmacist of any changes in medications, vitamins, or herbal products that you are taking. Some products that may interact with this drug include: capecitabine, imatinib, mifepristone. Aspirin, aspirin-like drugs (salicylates), and nonsteroidal anti-inflammatory drugs (NSAIDs such as ibuprofen, naproxen, celecoxib) may have effects similar to warfarin. These drugs may increase the risk of bleeding problems if taken during treatment with warfarin. Carefully check all prescription/nonprescription product labels (including drugs applied to the skin such as pain-relieving " creams) since the products may contain NSAIDs or salicylates. Talk to your doctor about using a different medication (such as acetaminophen) to treat pain/fever. Low-dose aspirin and related drugs (such as clopidogrel, ticlopidine) should be continued if prescribed by your doctor for specific medical reasons such as heart attack or stroke prevention. Consult your doctor or pharmacist for more details. Many herbal products interact with warfarin. Tell your doctor before taking any herbal products, especially bromelains, coenzyme Q10, cranberry, danshen, dong quai, fenugreek, garlic, ginkgo biloba, ginseng, and [place]'s wort, among others. This medication may interfere with a certain laboratory test to measure theophylline levels, possibly causing false test results. Make sure laboratory personnel and all your doctors know you use this drug.      OVERDOSE:  If overdose is suspected, contact a poison control center or emergency room immediately.  residents can call the [place] Poison Hotline at 1-615.348.3836. [place] residents can call a provincial poison control center. Symptoms of overdose may include: bloody/black/tarry stools, pink/dark urine, unusual/prolonged bleeding.      NOTES:  Do not share this medication with others. Laboratory and/or medical tests (such as INR, complete blood count) must be performed periodically to monitor your progress or check for side effects. Consult your doctor for more details.      MISSED DOSE:  For the best possible benefit, do not miss any doses. If you do miss a dose and remember on the same day, take it as soon as you remember. If you remember on the next day, skip the missed dose and resume your usual dosing schedule. Do not double the dose to catch up because this could increase your risk for bleeding. Keep a record of missed doses to give to your doctor or pharmacist. Contact your doctor or pharmacist if you miss 2 or more doses in a row.      STORAGE:  Store at room  temperature away from light and moisture. Do not store in the bathroom. Keep all medications away from children and pets. Do not flush medications down the toilet or pour them into a drain unless instructed to do so. Properly discard this product when it is  or no longer needed. Consult your pharmacist or local waste disposal company for more details about how to safely discard your product.      MEDICAL ALERT:  Your condition and medication can cause complications in a medical emergency. For information about enrolling in MedicAlert, call 1-399.945.8073 ([place]) or 1-357.437.4067 ([place]).      Information last revised 2010 Copyright(c) 2010 [employer]             Depression / Suicide Risk    As you are discharged from this [facility] facility, it is important to learn how to keep safe from harming yourself.    Recognize the warning signs:  · Abrupt changes in personality, positive or negative- including increase in energy   · Giving away possessions  · Change in eating patterns- significant weight changes-  positive or negative  · Change in sleeping patterns- unable to sleep or sleeping all the time   · Unwillingness or inability to communicate  · Depression  · Unusual sadness, discouragement and loneliness  · Talk of wanting to die  · Neglect of personal appearance   · Rebelliousness- reckless behavior  · Withdrawal from people/activities they love  · Confusion- inability to concentrate     If you or a loved one observes any of these behaviors or has concerns about self-harm, here's what you can do:  · Talk about it- your feelings and reasons for harming yourself  · Remove any means that you might use to hurt yourself (examples: pills, rope, extension cords, firearm)  · Get professional help from the community (Mental Health, Substance Abuse, psychological counseling)  · Do not be alone:Call your Safe Contact- someone whom you trust who will be there for you.  · Call your local CRISIS  HOTLINE 862-9016 or 549-346-1867  · Call your local Children's Mobile Crisis Response Team [place] (304) 635-7087 or www.[employer] Conne[employer]  · Call the toll free National Suicide Prevention Hotlines   · National Suicide Prevention Lifeline 605-023-IOFF (1318)  · National [employer] Line Network 800-SUICIDE (967-7307)

## 2018-01-31 NOTE — PROGRESS NOTES
Discharge instructions printed and AVS reviewed with patient. IV removed. Patient states no further questions at this time. [facility].    Prescriptions given to patient.

## 2018-01-31 NOTE — CARE PLAN
Problem: Safety  Goal: Will remain free from injury  Outcome: PROGRESSING AS EXPECTED  Pt remains free from falls or injuries. Bed alarm set and pt calls for assistance appropriately.     Problem: Venous Thromboembolism (VTW)/Deep Vein Thrombosis (DVT) Prevention:  Goal: Patient will participate in Venous Thrombosis (VTE)/Deep Vein Thrombosis (DVT)Prevention Measures  Outcome: PROGRESSING AS EXPECTED  Pt free from s/sx of DVT. Pt receiving Coumadin for VTE prophylaxis coverage.

## 2018-01-31 NOTE — DISCHARGE PLANNING
Follow up with [facility] spoke to Piper Daniela will need to have skilled nursing added to the order and then they will accept patient. Notified KASEY Gee.

## 2018-01-31 NOTE — PROGRESS NOTES
Pt care assumed. Pt lying comfortably in bed. A&O x 4. No distress present; no pain. Call light, phone, and bedside table within reach. White board updated and plan of care discussed with patient. Bed alarm set and pt calls for assistance appropriately. No concerns present at this time. Will continue to monitor.

## 2018-01-31 NOTE — PROGRESS NOTES
Inpatient Anticoagulation Service Note    Date: 1/31/2018  Reason for Anticoagulation: Bioprosthetic Valve Replacement (Aortic valve replacement)        Hemoglobin Value: (!) 9.5  Hematocrit Value: (!) 29.5  Lab Platelet Value: 173  Target INR: 2.0 to 3.0    INR from last 7 days     Date/Time INR Value    01/31/18 0311 (!)  2.54    01/29/18 0232 (!)  2.09    01/28/18 0416 (!)  2.03    01/27/18 0248 (!)  2.29    01/26/18 0853 (!)  2.41    01/25/18 1950 (!)  2.9        Dose from last 7 days     Date/Time Dose (mg)    01/31/18 1100  2.5    01/30/18 1000  5    01/29/18 1100  2.5    01/28/18 1100  5    01/27/18 1600  2.5    01/26/18 1400  2.5    01/26/18 0500  2.5        Average Dose (mg):  (Home dose: 5mg Sun/Tues and 2.5mg AOD per medrec)  Significant Interactions: Statin, Thyroid Medications, Aspirin  Bridge Therapy: No     Reversal Agent Administered: Not Applicable  Comments: Therapeutic INR. No new CBC available for review. No s/sx of overt bleeding noted per chart review. Doxycycline therapy completed yesterday. No other changes to warfarin-drug interaction profile at this time. Give warfarin 2.5 mg po tonight per home dose regimen. Next INR due Friday 2/2.     Plan:  Give warfarin 2.5 mg po tonight per home dose regimen. Next INR due Friday 2/2.   Education Material Provided?: No (Chronic tx)  Pharmacist suggested discharge dosing: Warfarin 5 mg PO every Sun/Tues and 2.5 mg on all other days with repeat INR within 4-5 days of discharge.      Amanda Quinn, PharmD, BCOP

## 2018-01-31 NOTE — PROGRESS NOTES
[facility] Nephrology Progress Note, Adult, Complex               Author: Ha Colunga Date & Time created: 1/31/2018  9:26 AM     Interval History:  The patient is a pleasant 68-year-old gentleman   with history of chronic kidney disease stage V, status post left nephrectomy   [facility], has   also history of diabetes, hypertension, hepatitis C, hypothyroidism,   obstructive sleep apnea with CPAP, history of aortic valve replacement, on   warfarin, presented to [facility] on January 25th with   complaints of generalized weakness status post fall with some bruising on the   right side of the chest.  Initially in the emergency room, patient was   saturating over 90% on 2 L nasal cannula.  He does not use oxygen at home.    His chest x-ray at that time showed bilateral opacities in the lung bases and   he was also found to be influenza A positive.  Hence, he was admitted for   treatment of possible pulmonary edema and the flu versus possible   community-acquired pneumonia.  His serum creatinine on admission was 5.66 with   a potassium of 5.5 and over the course of his stay, he was treated with   diuretics.  His creatinine today is 4.68, potassium 5.2, bicarbonate was 15.    Nephrology was hence called to evaluate him for his chronic kidney disease.    He has a GFR around 15 and is followed by [facility].    He was in preparation for a fistula creation in anticipation for dialysis in   the near future.  He is currently nonoliguric.  He has diarrhea for the past   3-4 days going about 3-4 times a day, watery in nature.  He is still receiving   Lasix and potassium.    Daily Nephrology Summary:  1/31/18 - No new overnight renal events. Non-oliguric and Scr is trending down.    Review of Systems:  Review of Systems   Constitutional: Negative.    HENT: Negative.    Eyes: Negative.    Respiratory: Positive for  shortness of breath.    Cardiovascular: Positive for orthopnea.   Gastrointestinal: Positive for diarrhea.   Musculoskeletal: Negative.    Skin: Negative.    Neurological: Negative.    Endo/Heme/Allergies: Negative.    Psychiatric/Behavioral: Negative.        Physical Exam:  Physical Exam   Constitutional: He is oriented to person, place, and time. He appears well-developed and well-nourished.   HENT:   Head: Normocephalic and atraumatic.   Eyes: Pupils are equal, round, and reactive to light.   Neck: Normal range of motion. Neck supple.   Cardiovascular: Normal rate and regular rhythm.    Pulmonary/Chest: No respiratory distress. He has no wheezes. He exhibits no tenderness.   Abdominal: Soft. Bowel sounds are normal.   Musculoskeletal: He exhibits edema.   Neurological: He is alert and oriented to person, place, and time.   Skin: Skin is warm and dry.   Nursing note and vitals reviewed.      Labs:  Recent Labs      01/29/18   1016   [id number]  7.36*   [id number]  26.4   [id number]  83.3   [id number]  95.5   ARTHCO3  15*   ARTBE  -10*         Recent Labs      01/29/18 0232 01/30/18 0314 01/31/18 0311   SODIUM  139  142  143   POTASSIUM  4.9  5.2  5.1   CHLORIDE  116*  120*  120*   CO2  16*  15*  15*   BUN  58*  49*  45*   CREATININE  5.15*  4.68*  4.14*   MAGNESIUM  1.9  1.6  1.7   CALCIUM  9.3  9.2  8.4*     Recent Labs      01/29/18   0232 01/30/18 0314  01/31/18 0311   GLUCOSE  111*  81  93     Recent Labs      01/29/18   0231 01/29/18   0232  01/30/18 0314  01/31/18 0311   RBC  3.42*   --   3.24*   --    HEMOGLOBIN  10.2*   --   9.5*   --    HEMATOCRIT  31.3*   --   29.5*   --    PLATELETCT  183   --   173   --    PROTHROMBTM   --   23.2*   --   27.0*   INR   --   2.09*   --   2.54*     Recent Labs      01/29/18 0231 01/30/18 0314   WBC  8.1  6.7   NEUTSPOLYS  62.20  73.20*   LYMPHOCYTES  20.80*  17.90*   MONOCYTES  8.00  2.70   EOSINOPHILS  8.00*  6.20   BASOPHILS  0.40  0.00            Hemodynamics:  Temp (24hrs), Av.6 °C (97.9 °F), Min:36.2 °C (97.2 °F), Max:37 °C (98.6 °F)  Temperature: 36.3 °C (97.4 °F)  Pulse  Av.6  Min: 59  Max: 107   Blood Pressure : 140/63     Respiratory:    Respiration: 20, Pulse Oximetry: 95 %     Work Of Breathing / Effort: Mild  RUL Breath Sounds: Diminished, RML Breath Sounds: Diminished, RLL Breath Sounds: Diminished, JET Breath Sounds: Diminished, LLL Breath Sounds: Diminished  Fluids:    Intake/Output Summary (Last 24 hours) at 18 0926  Last data filed at 18 0800   Gross per 24 hour   Intake              720 ml   Output              980 ml   Net             -260 ml     Weight: 98.3 kg (216 lb 11.4 oz)  GI/Nutrition:  Orders Placed This Encounter   Procedures   • Diet Order     Standing Status:   Standing     Number of Occurrences:   1     Order Specific Question:   Diet:     Answer:   Diabetic [3]     Order Specific Question:   Electrolyte modifications:     Answer:   Low Potassium [3]     Order Specific Question:   Electrolyte modifications:     Answer:   Low Phosphorus [5]     Medical Decision Making, by Problem:  Active Hospital Problems    Diagnosis   • *Acute hypoxic respiratory failure  [R09.02]   • ESRD (end stage renal disease) (CMS-[facility]) [N18.6]   • Sepsis (CMS-H[facility]) [A41.9]   • Acute on chronic systolic heart failure (CMS-[facility]) [I50.23]   • Hypertension [I10]   • Diabetes (CMS-[facility]) [E11.9]   • Hematuria [R31.9]   • Hyperlipidemia [E78.5]   • Hypothyroidism [E03.9]   • Obstructive sleep apnea [G47.33]   • History of mechanical fall without injury [W19.XXXA]   • H/O aortic valve replacement [Z95.2]   • Hepatitis C [B19.20]   • Complete heart block (CMS-[facility]) [I44.2]   • Obesity [E66.9]   • Renal osteodystrophy [N25.0]   • Anemia [D64.9]         Reviewed items::  Labs reviewed and Medications reviewed    ASSESSMENT AND PLAN:  1.  Chronic kidney disease stage V, has improved since admission.    Right now, there is no acute need to  start hemodialysis.  We will continue to   watch him.    2.  Shortness of breath.  He has had this exertional dyspnea for years, not on   home O2.  He likely will require oxygen as an outpatient on a chronic basis   even if he starts dialysis.  3.  Acidosis secondary to his renal failure.  I would start him on some sodium   bicarbonate t.i.d.  4.  Hyperkalemia, mild. No K+ supplement  5.  Anemia likely secondary to his renal failure.  Check iron status.  We have   to see if he requires iron supplement/IV iron.  6.  Renal osteodystrophy.  Check PTH and vitamin D.  7.  Influenza A.  8.  Possible community-acquired pneumonia.  9.  Diabetes.  10.  Hypertension.  Continue antihypertensives for now.  11.  Hypothyroidism.  12.  Hyperlipidemia.  13.  Obstructive sleep apnea, continue continuous positive airway pressure.    No changes to renal recommendations today.  Scr better  Will likely need to restart diuretics tomorrow at once daily dosing.

## 2018-01-31 NOTE — FACE TO FACE
Face to Face Supporting Documentation - Home Health    The encounter with this patient was in whole or in part the primary reason for home health admission.    Date of encounter:   Patient:                    MRN:                       YOB: 2020  Efrain Martines  [id number]  1950     Home health to see patient for:  Physical Therapy evaluation and treatment   Occupational Therapy evaluation and treatment    Skilled need for:  Exacerbation of Chronic Disease State - CHF    Homebound status evidenced by:  Need the aid of supportive devices such as crutches, canes, wheelchairs or walkers. Leaving home requires a considerable and taxing effort. There is a normal inability to leave the home.    Community Physician to provide follow up care: No primary care provider on file.     Optional Interventions? No      I certify the face to face encounter for this home health care referral meets the CMS requirements and the encounter/clinical assessment with the patient was, in whole, or in part, for the medical condition(s) listed above, which is the primary reason for home health care. Based on my clinical findings: the service(s) are medically necessary, support the need for home health care, and the homebound criteria are met.  I certify that this patient has had a face to face encounter by myself.  Ania Roberson M.D. - NPI: [id number]

## 2018-02-01 NOTE — PROGRESS NOTES
Patient discharged home via wheelchair to private auto at this time accompanied by wife. Belongings with patient.

## 2018-02-01 NOTE — DISCHARGE PLANNING
Medical Social Worker    BERNABE Estrada communicated with SW on the need to have R red team communicate with [facility] and provide a verbal confirmation on the need for Hh.  SW provided admissions [facility]'s number [phone] at  and asked [facility] to call.  [facility] red team reported they will call her to provide the necessary information to set up Hh.    Plan:  SW will continue to assist with pt's d/c needs.

## 2018-02-01 NOTE — DOCUMENTATION QUERY
DOCUMENTATION QUERY    PROVIDERS: Please select “Cosign w/ note”to reply to query.    To better represent the severity of illness of your patient, please review the following information and exercise your independent professional judgment in responding to this query.     There is conflicting documentation documented in the Medical Record.  1.  CKD 5 documented  per the nephrology consult and progress notes.  2.  ESRD and CKD 4 nearing dialysis per the [facility] resident's  DC summary. Based upon the clinical findings, risk factors, and treatment, can these  diagnoses be further specified?    • CKD 4 has been ruled in;  CKD 5 & ESRD ruled out  • CKD 5 has been ruled in; CKD 4 & ESRD ruled out  • ESRD ruled in; CKD 4 & CKD 5 ruled out  • Other explanation of clinical findings  • Unable to determine    The medical record reflects the following:   Clinical Findings Per the Medical Record; ESRD,  CKD 4  and CKD 5 has been documented;    1/25- BUN/Cr- 59 & 5.66 w/GFR of 10;  1/31- Bun/Cr- 45 & 4.14 w/GFR of 14;     Treatment  per nephrology- nop immediate need for dialysis   Risk Factors Age; HTN, acute on chronic systolic HF; current medical condition   Location within medical record  History and Physical, Progress Notes and Discharge Summary     Thank you,  Angeles Machuca RN, CCDS   Sr. Clinical    ph- [phone]

## 2018-02-06 NOTE — DOCUMENTATION QUERY
"DOCUMENTATION QUERY    PROVIDERS: Please select “Cosign w/ note” to reply to query.    To better represent the severity of illness of your patient, please review the following information and exercise your independent professional judgment in responding to this query.     Acute hypoxic respiratory failure secondary to Influenza A and superimposed pneumonia is documented in the Discharge Summary.  Sepsis due to Influenza A with superimposed pneumonia is also documented in the Progress Notes. Patient diagnosed with acute renal failure as well.     Per coding guidelines, the clinical indicator for severe sepsis is Major ORGAN dysfunction/failure/shock DUE to Sepsis.  Based upon the clinical findings, risk factors, and treatment, can the relationship between these conditions be further specified?    • Severe sepsis with associated:  o Acute respiratory failure  o Acute renal failure  o Other associated organ dysfunction/failure (please specify)  • Other explanation of clinical findings (please document)  • Unable to determine        The medical record reflects the following:   Clinical Findings  BP (!) 166/87   Pulse 76   Temp 37.3 °C (99.1 °F)   Resp 18   Ht 1.803 m (5' 11\")   Wt 108 kg (238 lb 1.6 oz)   SpO2 95%   BMI 33.21 kg/m²    Treatment  Tamiflu   IV Fluids   IV antibiotics   Respiratory protocol   incentive spirometry   Risk Factors     Location within medical record  Progress Notes   Discharge Summary   H & P     Thank you,   Elizabeth Sawallisch        "

## 2019-01-01 ENCOUNTER — APPOINTMENT (OUTPATIENT)
Dept: RADIOLOGY | Facility: MEDICAL CENTER | Age: 69
DRG: 981 | End: 2019-01-01
Attending: STUDENT IN AN ORGANIZED HEALTH CARE EDUCATION/TRAINING PROGRAM
Payer: MEDICARE

## 2019-01-01 ENCOUNTER — APPOINTMENT (OUTPATIENT)
Dept: RADIOLOGY | Facility: MEDICAL CENTER | Age: 69
DRG: 981 | End: 2019-01-01
Attending: INTERNAL MEDICINE
Payer: MEDICARE

## 2019-01-01 ENCOUNTER — APPOINTMENT (OUTPATIENT)
Dept: RADIOLOGY | Facility: MEDICAL CENTER | Age: 69
DRG: 981 | End: 2019-01-01
Attending: EMERGENCY MEDICINE
Payer: MEDICARE

## 2019-01-01 ENCOUNTER — HOSPITAL ENCOUNTER (INPATIENT)
Facility: MEDICAL CENTER | Age: 69
LOS: 10 days | DRG: 981 | End: 2019-04-23
Attending: EMERGENCY MEDICINE | Admitting: INTERNAL MEDICINE
Payer: MEDICARE

## 2019-01-01 ENCOUNTER — APPOINTMENT (OUTPATIENT)
Dept: RADIOLOGY | Facility: MEDICAL CENTER | Age: 69
DRG: 981 | End: 2019-01-01
Attending: PSYCHIATRY & NEUROLOGY
Payer: MEDICARE

## 2019-01-01 VITALS
OXYGEN SATURATION: 41 % | TEMPERATURE: 98.4 F | SYSTOLIC BLOOD PRESSURE: 113 MMHG | HEIGHT: 71 IN | WEIGHT: 182.54 LBS | DIASTOLIC BLOOD PRESSURE: 59 MMHG | RESPIRATION RATE: 22 BRPM | BODY MASS INDEX: 25.56 KG/M2

## 2019-01-01 DIAGNOSIS — R79.89 ELEVATED TROPONIN: ICD-10-CM

## 2019-01-01 DIAGNOSIS — R47.1 DYSARTHRIA: ICD-10-CM

## 2019-01-01 DIAGNOSIS — R41.82 ALTERED MENTAL STATUS, UNSPECIFIED ALTERED MENTAL STATUS TYPE: ICD-10-CM

## 2019-01-01 LAB
ABO GROUP BLD: NORMAL
ABO GROUP BLD: NORMAL
ACTION RANGE TRIGGERED IACRT: NO
ALBUMIN SERPL BCP-MCNC: 3.1 G/DL (ref 3.2–4.9)
ALBUMIN SERPL BCP-MCNC: 3.4 G/DL (ref 3.2–4.9)
ALBUMIN SERPL BCP-MCNC: 3.5 G/DL (ref 3.2–4.9)
ALBUMIN SERPL BCP-MCNC: 3.6 G/DL (ref 3.2–4.9)
ALBUMIN SERPL BCP-MCNC: 3.7 G/DL (ref 3.2–4.9)
ALBUMIN SERPL BCP-MCNC: 3.8 G/DL (ref 3.2–4.9)
ALBUMIN SERPL BCP-MCNC: 4.1 G/DL (ref 3.2–4.9)
ALBUMIN/GLOB SERPL: 0.9 G/DL
ALBUMIN/GLOB SERPL: 0.9 G/DL
ALBUMIN/GLOB SERPL: 1 G/DL
ALBUMIN/GLOB SERPL: 1.1 G/DL
ALBUMIN/GLOB SERPL: 1.1 G/DL
ALP SERPL-CCNC: 102 U/L (ref 30–99)
ALP SERPL-CCNC: 104 U/L (ref 30–99)
ALP SERPL-CCNC: 107 U/L (ref 30–99)
ALP SERPL-CCNC: 79 U/L (ref 30–99)
ALP SERPL-CCNC: 82 U/L (ref 30–99)
ALP SERPL-CCNC: 85 U/L (ref 30–99)
ALP SERPL-CCNC: 87 U/L (ref 30–99)
ALP SERPL-CCNC: 91 U/L (ref 30–99)
ALP SERPL-CCNC: 93 U/L (ref 30–99)
ALP SERPL-CCNC: 95 U/L (ref 30–99)
ALT SERPL-CCNC: 12 U/L (ref 2–50)
ALT SERPL-CCNC: 9 U/L (ref 2–50)
ALT SERPL-CCNC: <5 U/L (ref 2–50)
ANION GAP SERPL CALC-SCNC: 13 MMOL/L (ref 0–11.9)
ANION GAP SERPL CALC-SCNC: 13 MMOL/L (ref 0–11.9)
ANION GAP SERPL CALC-SCNC: 14 MMOL/L (ref 0–11.9)
ANION GAP SERPL CALC-SCNC: 15 MMOL/L (ref 0–11.9)
ANION GAP SERPL CALC-SCNC: 16 MMOL/L (ref 0–11.9)
ANION GAP SERPL CALC-SCNC: 18 MMOL/L (ref 0–11.9)
ANION GAP SERPL CALC-SCNC: 19 MMOL/L (ref 0–11.9)
APPEARANCE FLD: CLEAR
APPEARANCE UR: CLEAR
APTT PPP: 28.2 SEC (ref 24.7–36)
APTT PPP: 42.8 SEC (ref 24.7–36)
APTT PPP: 51.6 SEC (ref 24.7–36)
APTT PPP: 53.2 SEC (ref 24.7–36)
APTT PPP: 54 SEC (ref 24.7–36)
APTT PPP: 67.8 SEC (ref 24.7–36)
APTT PPP: 90.8 SEC (ref 24.7–36)
AST SERPL-CCNC: 10 U/L (ref 12–45)
AST SERPL-CCNC: 11 U/L (ref 12–45)
AST SERPL-CCNC: 11 U/L (ref 12–45)
AST SERPL-CCNC: 7 U/L (ref 12–45)
AST SERPL-CCNC: 8 U/L (ref 12–45)
AST SERPL-CCNC: 8 U/L (ref 12–45)
AST SERPL-CCNC: 9 U/L (ref 12–45)
BACTERIA #/AREA URNS HPF: NEGATIVE /HPF
BACTERIA BLD CULT: NORMAL
BACTERIA BLD CULT: NORMAL
BACTERIA FLD AEROBE CULT: NORMAL
BASE EXCESS BLDA CALC-SCNC: -1 MMOL/L (ref -4–3)
BASE EXCESS BLDA CALC-SCNC: -4 MMOL/L (ref -4–3)
BASE EXCESS BLDA CALC-SCNC: -5 MMOL/L (ref -4–3)
BASE EXCESS BLDA CALC-SCNC: -6 MMOL/L (ref -4–3)
BASOPHILS # BLD AUTO: 0.3 % (ref 0–1.8)
BASOPHILS # BLD AUTO: 0.4 % (ref 0–1.8)
BASOPHILS # BLD AUTO: 0.5 % (ref 0–1.8)
BASOPHILS # BLD AUTO: 0.5 % (ref 0–1.8)
BASOPHILS # BLD AUTO: 0.6 % (ref 0–1.8)
BASOPHILS # BLD: 0.03 K/UL (ref 0–0.12)
BASOPHILS # BLD: 0.04 K/UL (ref 0–0.12)
BASOPHILS # BLD: 0.05 K/UL (ref 0–0.12)
BASOPHILS # BLD: 0.06 K/UL (ref 0–0.12)
BILIRUB SERPL-MCNC: 0.3 MG/DL (ref 0.1–1.5)
BILIRUB SERPL-MCNC: 0.4 MG/DL (ref 0.1–1.5)
BILIRUB SERPL-MCNC: 0.5 MG/DL (ref 0.1–1.5)
BILIRUB SERPL-MCNC: 0.6 MG/DL (ref 0.1–1.5)
BILIRUB SERPL-MCNC: 0.6 MG/DL (ref 0.1–1.5)
BILIRUB UR QL STRIP.AUTO: NEGATIVE
BLD GP AB INVEST PLASRBC-IMP: NORMAL
BLD GP AB INVEST PLASRBC-IMP: NORMAL
BLD GP AB SCN SERPL QL: NORMAL
BODY FLD TYPE: NORMAL
BODY TEMPERATURE: ABNORMAL DEGREES
BODY TEMPERATURE: NORMAL CENTIGRADE
BUN SERPL-MCNC: 55 MG/DL (ref 8–22)
BUN SERPL-MCNC: 56 MG/DL (ref 8–22)
BUN SERPL-MCNC: 59 MG/DL (ref 8–22)
BUN SERPL-MCNC: 61 MG/DL (ref 8–22)
BUN SERPL-MCNC: 63 MG/DL (ref 8–22)
BUN SERPL-MCNC: 63 MG/DL (ref 8–22)
BUN SERPL-MCNC: 69 MG/DL (ref 8–22)
BUN SERPL-MCNC: 72 MG/DL (ref 8–22)
BUN SERPL-MCNC: 73 MG/DL (ref 8–22)
BUN SERPL-MCNC: 74 MG/DL (ref 8–22)
BUN SERPL-MCNC: 77 MG/DL (ref 8–22)
BUN SERPL-MCNC: 77 MG/DL (ref 8–22)
CALCIUM SERPL-MCNC: 10 MG/DL (ref 8.5–10.5)
CALCIUM SERPL-MCNC: 10.1 MG/DL (ref 8.5–10.5)
CALCIUM SERPL-MCNC: 10.3 MG/DL (ref 8.5–10.5)
CALCIUM SERPL-MCNC: 10.4 MG/DL (ref 8.5–10.5)
CALCIUM SERPL-MCNC: 10.4 MG/DL (ref 8.5–10.5)
CALCIUM SERPL-MCNC: 10.8 MG/DL (ref 8.5–10.5)
CALCIUM SERPL-MCNC: 10.9 MG/DL (ref 8.5–10.5)
CALCIUM SERPL-MCNC: 11.2 MG/DL (ref 8.5–10.5)
CALCIUM SERPL-MCNC: 9.4 MG/DL (ref 8.5–10.5)
CALCIUM SERPL-MCNC: 9.6 MG/DL (ref 8.5–10.5)
CALCIUM SERPL-MCNC: 9.9 MG/DL (ref 8.5–10.5)
CALCIUM SERPL-MCNC: 9.9 MG/DL (ref 8.5–10.5)
CFT BLD TEG: 4.8 MIN (ref 5–10)
CHLORIDE SERPL-SCNC: 100 MMOL/L (ref 96–112)
CHLORIDE SERPL-SCNC: 101 MMOL/L (ref 96–112)
CHLORIDE SERPL-SCNC: 102 MMOL/L (ref 96–112)
CHLORIDE SERPL-SCNC: 104 MMOL/L (ref 96–112)
CHLORIDE SERPL-SCNC: 105 MMOL/L (ref 96–112)
CHLORIDE SERPL-SCNC: 106 MMOL/L (ref 96–112)
CHLORIDE SERPL-SCNC: 106 MMOL/L (ref 96–112)
CHLORIDE SERPL-SCNC: 99 MMOL/L (ref 96–112)
CHOLEST SERPL-MCNC: 103 MG/DL (ref 100–199)
CHOLEST SERPL-MCNC: 99 MG/DL (ref 100–199)
CLOT ANGLE BLD TEG: 75 DEGREES (ref 53–72)
CLOT LYSIS 30M P MA LENFR BLD TEG: 0 % (ref 0–8)
CO2 BLDA-SCNC: 17 MMOL/L (ref 20–33)
CO2 BLDA-SCNC: 20 MMOL/L (ref 20–33)
CO2 BLDA-SCNC: 22 MMOL/L (ref 20–33)
CO2 SERPL-SCNC: 19 MMOL/L (ref 20–33)
CO2 SERPL-SCNC: 20 MMOL/L (ref 20–33)
CO2 SERPL-SCNC: 21 MMOL/L (ref 20–33)
CO2 SERPL-SCNC: 22 MMOL/L (ref 20–33)
CO2 SERPL-SCNC: 23 MMOL/L (ref 20–33)
CO2 SERPL-SCNC: 24 MMOL/L (ref 20–33)
CO2 SERPL-SCNC: 24 MMOL/L (ref 20–33)
CO2 SERPL-SCNC: 25 MMOL/L (ref 20–33)
COLOR FLD: NORMAL
COLOR UR: YELLOW
CREAT SERPL-MCNC: 10.58 MG/DL (ref 0.5–1.4)
CREAT SERPL-MCNC: 8.01 MG/DL (ref 0.5–1.4)
CREAT SERPL-MCNC: 8.23 MG/DL (ref 0.5–1.4)
CREAT SERPL-MCNC: 8.44 MG/DL (ref 0.5–1.4)
CREAT SERPL-MCNC: 8.56 MG/DL (ref 0.5–1.4)
CREAT SERPL-MCNC: 8.67 MG/DL (ref 0.5–1.4)
CREAT SERPL-MCNC: 8.71 MG/DL (ref 0.5–1.4)
CREAT SERPL-MCNC: 8.84 MG/DL (ref 0.5–1.4)
CREAT SERPL-MCNC: 9.27 MG/DL (ref 0.5–1.4)
CREAT SERPL-MCNC: 9.34 MG/DL (ref 0.5–1.4)
CREAT SERPL-MCNC: 9.48 MG/DL (ref 0.5–1.4)
CREAT SERPL-MCNC: 9.63 MG/DL (ref 0.5–1.4)
CRP SERPL HS-MCNC: 3.23 MG/DL (ref 0–0.75)
CRP SERPL HS-MCNC: 4.03 MG/DL (ref 0–0.75)
CRP SERPL HS-MCNC: 4.07 MG/DL (ref 0–0.75)
CT.EXTRINSIC BLD ROTEM: 1 MIN (ref 1–3)
CYTOLOGY REG CYTOL: NORMAL
EKG IMPRESSION: NORMAL
EOSINOPHIL # BLD AUTO: 0.04 K/UL (ref 0–0.51)
EOSINOPHIL # BLD AUTO: 0.08 K/UL (ref 0–0.51)
EOSINOPHIL # BLD AUTO: 0.17 K/UL (ref 0–0.51)
EOSINOPHIL # BLD AUTO: 0.18 K/UL (ref 0–0.51)
EOSINOPHIL # BLD AUTO: 0.25 K/UL (ref 0–0.51)
EOSINOPHIL # BLD AUTO: 0.25 K/UL (ref 0–0.51)
EOSINOPHIL # BLD AUTO: 0.31 K/UL (ref 0–0.51)
EOSINOPHIL # BLD AUTO: 0.4 K/UL (ref 0–0.51)
EOSINOPHIL # BLD AUTO: 0.44 K/UL (ref 0–0.51)
EOSINOPHIL NFR BLD: 0.3 % (ref 0–6.9)
EOSINOPHIL NFR BLD: 0.6 % (ref 0–6.9)
EOSINOPHIL NFR BLD: 1.3 % (ref 0–6.9)
EOSINOPHIL NFR BLD: 1.7 % (ref 0–6.9)
EOSINOPHIL NFR BLD: 2.2 % (ref 0–6.9)
EOSINOPHIL NFR BLD: 2.7 % (ref 0–6.9)
EOSINOPHIL NFR BLD: 3.7 % (ref 0–6.9)
EOSINOPHIL NFR BLD: 3.7 % (ref 0–6.9)
EOSINOPHIL NFR BLD: 4.9 % (ref 0–6.9)
EOSINOPHIL NFR FLD: 7 %
EPI CELLS #/AREA URNS HPF: NEGATIVE /HPF
ERYTHROCYTE [DISTWIDTH] IN BLOOD BY AUTOMATED COUNT: 44.5 FL (ref 35.9–50)
ERYTHROCYTE [DISTWIDTH] IN BLOOD BY AUTOMATED COUNT: 45.1 FL (ref 35.9–50)
ERYTHROCYTE [DISTWIDTH] IN BLOOD BY AUTOMATED COUNT: 45.1 FL (ref 35.9–50)
ERYTHROCYTE [DISTWIDTH] IN BLOOD BY AUTOMATED COUNT: 45.7 FL (ref 35.9–50)
ERYTHROCYTE [DISTWIDTH] IN BLOOD BY AUTOMATED COUNT: 46.2 FL (ref 35.9–50)
ERYTHROCYTE [DISTWIDTH] IN BLOOD BY AUTOMATED COUNT: 46.7 FL (ref 35.9–50)
ERYTHROCYTE [DISTWIDTH] IN BLOOD BY AUTOMATED COUNT: 47.1 FL (ref 35.9–50)
ERYTHROCYTE [DISTWIDTH] IN BLOOD BY AUTOMATED COUNT: 48 FL (ref 35.9–50)
ERYTHROCYTE [DISTWIDTH] IN BLOOD BY AUTOMATED COUNT: 48.2 FL (ref 35.9–50)
EST. AVERAGE GLUCOSE BLD GHB EST-MCNC: 220 MG/DL
GLOBULIN SER CALC-MCNC: 3.1 G/DL (ref 1.9–3.5)
GLOBULIN SER CALC-MCNC: 3.4 G/DL (ref 1.9–3.5)
GLOBULIN SER CALC-MCNC: 3.4 G/DL (ref 1.9–3.5)
GLOBULIN SER CALC-MCNC: 3.5 G/DL (ref 1.9–3.5)
GLOBULIN SER CALC-MCNC: 3.6 G/DL (ref 1.9–3.5)
GLOBULIN SER CALC-MCNC: 3.7 G/DL (ref 1.9–3.5)
GLOBULIN SER CALC-MCNC: 4 G/DL (ref 1.9–3.5)
GLOBULIN SER CALC-MCNC: 4.1 G/DL (ref 1.9–3.5)
GLUCOSE BLD-MCNC: 105 MG/DL (ref 65–99)
GLUCOSE BLD-MCNC: 160 MG/DL (ref 65–99)
GLUCOSE BLD-MCNC: 172 MG/DL (ref 65–99)
GLUCOSE BLD-MCNC: 190 MG/DL (ref 65–99)
GLUCOSE BLD-MCNC: 192 MG/DL (ref 65–99)
GLUCOSE BLD-MCNC: 198 MG/DL (ref 65–99)
GLUCOSE BLD-MCNC: 213 MG/DL (ref 65–99)
GLUCOSE BLD-MCNC: 215 MG/DL (ref 65–99)
GLUCOSE BLD-MCNC: 216 MG/DL (ref 65–99)
GLUCOSE BLD-MCNC: 220 MG/DL (ref 65–99)
GLUCOSE BLD-MCNC: 225 MG/DL (ref 65–99)
GLUCOSE BLD-MCNC: 226 MG/DL (ref 65–99)
GLUCOSE BLD-MCNC: 230 MG/DL (ref 65–99)
GLUCOSE BLD-MCNC: 231 MG/DL (ref 65–99)
GLUCOSE BLD-MCNC: 246 MG/DL (ref 65–99)
GLUCOSE BLD-MCNC: 252 MG/DL (ref 65–99)
GLUCOSE BLD-MCNC: 271 MG/DL (ref 65–99)
GLUCOSE BLD-MCNC: 313 MG/DL (ref 65–99)
GLUCOSE BLD-MCNC: 324 MG/DL (ref 65–99)
GLUCOSE BLD-MCNC: 326 MG/DL (ref 65–99)
GLUCOSE BLD-MCNC: 333 MG/DL (ref 65–99)
GLUCOSE BLD-MCNC: 379 MG/DL (ref 65–99)
GLUCOSE BLD-MCNC: 87 MG/DL (ref 65–99)
GLUCOSE SERPL-MCNC: 114 MG/DL (ref 65–99)
GLUCOSE SERPL-MCNC: 137 MG/DL (ref 65–99)
GLUCOSE SERPL-MCNC: 177 MG/DL (ref 65–99)
GLUCOSE SERPL-MCNC: 191 MG/DL (ref 65–99)
GLUCOSE SERPL-MCNC: 203 MG/DL (ref 65–99)
GLUCOSE SERPL-MCNC: 220 MG/DL (ref 65–99)
GLUCOSE SERPL-MCNC: 227 MG/DL (ref 65–99)
GLUCOSE SERPL-MCNC: 232 MG/DL (ref 65–99)
GLUCOSE SERPL-MCNC: 239 MG/DL (ref 65–99)
GLUCOSE SERPL-MCNC: 253 MG/DL (ref 65–99)
GLUCOSE SERPL-MCNC: 295 MG/DL (ref 65–99)
GLUCOSE SERPL-MCNC: 363 MG/DL (ref 65–99)
GLUCOSE UR STRIP.AUTO-MCNC: 500 MG/DL
GRAM STN SPEC: NORMAL
HAV IGM SERPL QL IA: POSITIVE
HBA1C MFR BLD: 9.3 % (ref 0–5.6)
HBV CORE IGM SER QL: NEGATIVE
HBV SURFACE AB SERPL IA-ACNC: <3.1 MIU/ML (ref 0–10)
HBV SURFACE AG SER QL: NEGATIVE
HCO3 BLDA-SCNC: 16.4 MMOL/L (ref 17–25)
HCO3 BLDA-SCNC: 19.5 MMOL/L (ref 17–25)
HCO3 BLDA-SCNC: 20.6 MMOL/L (ref 17–25)
HCO3 BLDA-SCNC: 21 MMOL/L (ref 17–25)
HCT VFR BLD AUTO: 33.9 % (ref 42–52)
HCT VFR BLD AUTO: 38 % (ref 42–52)
HCT VFR BLD AUTO: 40.4 % (ref 42–52)
HCT VFR BLD AUTO: 40.5 % (ref 42–52)
HCT VFR BLD AUTO: 40.7 % (ref 42–52)
HCT VFR BLD AUTO: 41.2 % (ref 42–52)
HCT VFR BLD AUTO: 41.7 % (ref 42–52)
HCT VFR BLD AUTO: 43.3 % (ref 42–52)
HCT VFR BLD AUTO: 46.7 % (ref 42–52)
HCV AB SER QL: REACTIVE
HCV RNA SERPL NAA+PROBE-ACNC: NOT DETECTED IU/ML
HCV RNA SERPL NAA+PROBE-LOG IU: NOT DETECTED LOG IU/ML
HCV RNA SERPL QL NAA+PROBE: NOT DETECTED
HDLC SERPL-MCNC: 16 MG/DL
HDLC SERPL-MCNC: 21 MG/DL
HGB BLD-MCNC: 10.8 G/DL (ref 14–18)
HGB BLD-MCNC: 12.6 G/DL (ref 14–18)
HGB BLD-MCNC: 12.8 G/DL (ref 14–18)
HGB BLD-MCNC: 12.8 G/DL (ref 14–18)
HGB BLD-MCNC: 13 G/DL (ref 14–18)
HGB BLD-MCNC: 13.1 G/DL (ref 14–18)
HGB BLD-MCNC: 13.4 G/DL (ref 14–18)
HGB BLD-MCNC: 14.3 G/DL (ref 14–18)
HGB BLD-MCNC: 15.4 G/DL (ref 14–18)
HOROWITZ INDEX BLDA+IHG-RTO: 225 MM[HG]
HOROWITZ INDEX BLDA+IHG-RTO: 317 MM[HG]
HOROWITZ INDEX BLDA+IHG-RTO: 370 MM[HG]
HYALINE CASTS #/AREA URNS LPF: ABNORMAL /LPF
IMM GRANULOCYTES # BLD AUTO: 0.02 K/UL (ref 0–0.11)
IMM GRANULOCYTES # BLD AUTO: 0.03 K/UL (ref 0–0.11)
IMM GRANULOCYTES # BLD AUTO: 0.04 K/UL (ref 0–0.11)
IMM GRANULOCYTES # BLD AUTO: 0.04 K/UL (ref 0–0.11)
IMM GRANULOCYTES # BLD AUTO: 0.05 K/UL (ref 0–0.11)
IMM GRANULOCYTES # BLD AUTO: 0.05 K/UL (ref 0–0.11)
IMM GRANULOCYTES # BLD AUTO: 0.06 K/UL (ref 0–0.11)
IMM GRANULOCYTES # BLD AUTO: 0.06 K/UL (ref 0–0.11)
IMM GRANULOCYTES # BLD AUTO: 0.08 K/UL (ref 0–0.11)
IMM GRANULOCYTES NFR BLD AUTO: 0.2 % (ref 0–0.9)
IMM GRANULOCYTES NFR BLD AUTO: 0.3 % (ref 0–0.9)
IMM GRANULOCYTES NFR BLD AUTO: 0.4 % (ref 0–0.9)
IMM GRANULOCYTES NFR BLD AUTO: 0.4 % (ref 0–0.9)
IMM GRANULOCYTES NFR BLD AUTO: 0.5 % (ref 0–0.9)
IMM GRANULOCYTES NFR BLD AUTO: 0.6 % (ref 0–0.9)
INR PPP: 1.27 (ref 0.87–1.13)
INR PPP: 1.3 (ref 0.87–1.13)
INR PPP: 1.31 (ref 0.87–1.13)
INR PPP: 1.41 (ref 0.87–1.13)
INR PPP: 1.43 (ref 0.87–1.13)
INR PPP: 1.47 (ref 0.87–1.13)
INR PPP: 1.52 (ref 0.87–1.13)
INR PPP: 2.23 (ref 0.87–1.13)
INR PPP: 2.27 (ref 0.87–1.13)
INR PPP: 3.22 (ref 0.87–1.13)
INR PPP: 4.8 (ref 0.87–1.13)
INR PPP: 5.56 (ref 0.87–1.13)
INST. QUALIFIED PATIENT IIQPT: YES
KETONES UR STRIP.AUTO-MCNC: NEGATIVE MG/DL
LACTATE BLD-SCNC: 1.1 MMOL/L (ref 0.5–2)
LDLC SERPL CALC-MCNC: 27 MG/DL
LDLC SERPL CALC-MCNC: 46 MG/DL
LEUKOCYTE ESTERASE UR QL STRIP.AUTO: NEGATIVE
LYMPHOCYTES # BLD AUTO: 0.66 K/UL (ref 1–4.8)
LYMPHOCYTES # BLD AUTO: 0.93 K/UL (ref 1–4.8)
LYMPHOCYTES # BLD AUTO: 0.93 K/UL (ref 1–4.8)
LYMPHOCYTES # BLD AUTO: 1.04 K/UL (ref 1–4.8)
LYMPHOCYTES # BLD AUTO: 1.28 K/UL (ref 1–4.8)
LYMPHOCYTES # BLD AUTO: 1.3 K/UL (ref 1–4.8)
LYMPHOCYTES # BLD AUTO: 1.32 K/UL (ref 1–4.8)
LYMPHOCYTES # BLD AUTO: 1.32 K/UL (ref 1–4.8)
LYMPHOCYTES # BLD AUTO: 1.36 K/UL (ref 1–4.8)
LYMPHOCYTES NFR BLD: 11.4 % (ref 22–41)
LYMPHOCYTES NFR BLD: 12.2 % (ref 22–41)
LYMPHOCYTES NFR BLD: 14.1 % (ref 22–41)
LYMPHOCYTES NFR BLD: 15.3 % (ref 22–41)
LYMPHOCYTES NFR BLD: 15.4 % (ref 22–41)
LYMPHOCYTES NFR BLD: 5.7 % (ref 22–41)
LYMPHOCYTES NFR BLD: 6.8 % (ref 22–41)
LYMPHOCYTES NFR BLD: 7.4 % (ref 22–41)
LYMPHOCYTES NFR BLD: 9.7 % (ref 22–41)
LYMPHOCYTES NFR FLD: 72 %
MAGNESIUM SERPL-MCNC: 1.8 MG/DL (ref 1.5–2.5)
MAGNESIUM SERPL-MCNC: 1.9 MG/DL (ref 1.5–2.5)
MAGNESIUM SERPL-MCNC: 2 MG/DL (ref 1.5–2.5)
MAGNESIUM SERPL-MCNC: 2 MG/DL (ref 1.5–2.5)
MCF BLD TEG: 77 MM (ref 50–70)
MCH RBC QN AUTO: 30 PG (ref 27–33)
MCH RBC QN AUTO: 30.2 PG (ref 27–33)
MCH RBC QN AUTO: 30.4 PG (ref 27–33)
MCH RBC QN AUTO: 30.5 PG (ref 27–33)
MCH RBC QN AUTO: 30.5 PG (ref 27–33)
MCH RBC QN AUTO: 30.7 PG (ref 27–33)
MCH RBC QN AUTO: 30.7 PG (ref 27–33)
MCH RBC QN AUTO: 30.9 PG (ref 27–33)
MCH RBC QN AUTO: 30.9 PG (ref 27–33)
MCHC RBC AUTO-ENTMCNC: 31.4 G/DL (ref 33.7–35.3)
MCHC RBC AUTO-ENTMCNC: 31.6 G/DL (ref 33.7–35.3)
MCHC RBC AUTO-ENTMCNC: 31.6 G/DL (ref 33.7–35.3)
MCHC RBC AUTO-ENTMCNC: 31.9 G/DL (ref 33.7–35.3)
MCHC RBC AUTO-ENTMCNC: 32.1 G/DL (ref 33.7–35.3)
MCHC RBC AUTO-ENTMCNC: 32.4 G/DL (ref 33.7–35.3)
MCHC RBC AUTO-ENTMCNC: 33 G/DL (ref 33.7–35.3)
MCHC RBC AUTO-ENTMCNC: 33 G/DL (ref 33.7–35.3)
MCHC RBC AUTO-ENTMCNC: 33.2 G/DL (ref 33.7–35.3)
MCV RBC AUTO: 93 FL (ref 81.4–97.8)
MCV RBC AUTO: 93.1 FL (ref 81.4–97.8)
MCV RBC AUTO: 93.5 FL (ref 81.4–97.8)
MCV RBC AUTO: 93.9 FL (ref 81.4–97.8)
MCV RBC AUTO: 94.6 FL (ref 81.4–97.8)
MCV RBC AUTO: 95.2 FL (ref 81.4–97.8)
MCV RBC AUTO: 95.5 FL (ref 81.4–97.8)
MCV RBC AUTO: 96.7 FL (ref 81.4–97.8)
MCV RBC AUTO: 97.1 FL (ref 81.4–97.8)
MICRO URNS: ABNORMAL
MONOCYTES # BLD AUTO: 0.76 K/UL (ref 0–0.85)
MONOCYTES # BLD AUTO: 0.83 K/UL (ref 0–0.85)
MONOCYTES # BLD AUTO: 0.87 K/UL (ref 0–0.85)
MONOCYTES # BLD AUTO: 0.89 K/UL (ref 0–0.85)
MONOCYTES # BLD AUTO: 0.94 K/UL (ref 0–0.85)
MONOCYTES # BLD AUTO: 0.96 K/UL (ref 0–0.85)
MONOCYTES # BLD AUTO: 1.05 K/UL (ref 0–0.85)
MONOCYTES # BLD AUTO: 1.17 K/UL (ref 0–0.85)
MONOCYTES # BLD AUTO: 1.25 K/UL (ref 0–0.85)
MONOCYTES NFR BLD AUTO: 10.2 % (ref 0–13.4)
MONOCYTES NFR BLD AUTO: 7.7 % (ref 0–13.4)
MONOCYTES NFR BLD AUTO: 8.3 % (ref 0–13.4)
MONOCYTES NFR BLD AUTO: 8.4 % (ref 0–13.4)
MONOCYTES NFR BLD AUTO: 8.8 % (ref 0–13.4)
MONOCYTES NFR BLD AUTO: 8.9 % (ref 0–13.4)
MONOCYTES NFR BLD AUTO: 9.1 % (ref 0–13.4)
MONOCYTES NFR BLD AUTO: 9.8 % (ref 0–13.4)
MONOCYTES NFR BLD AUTO: 9.8 % (ref 0–13.4)
MONONUC CELLS NFR FLD: 3 %
NEUTROPHILS # BLD AUTO: 10.35 K/UL (ref 1.82–7.42)
NEUTROPHILS # BLD AUTO: 11.33 K/UL (ref 1.82–7.42)
NEUTROPHILS # BLD AUTO: 5.92 K/UL (ref 1.82–7.42)
NEUTROPHILS # BLD AUTO: 6.15 K/UL (ref 1.82–7.42)
NEUTROPHILS # BLD AUTO: 6.76 K/UL (ref 1.82–7.42)
NEUTROPHILS # BLD AUTO: 8.07 K/UL (ref 1.82–7.42)
NEUTROPHILS # BLD AUTO: 8.48 K/UL (ref 1.82–7.42)
NEUTROPHILS # BLD AUTO: 9.05 K/UL (ref 1.82–7.42)
NEUTROPHILS # BLD AUTO: 9.52 K/UL (ref 1.82–7.42)
NEUTROPHILS NFR BLD: 69.3 % (ref 44–72)
NEUTROPHILS NFR BLD: 70 % (ref 44–72)
NEUTROPHILS NFR BLD: 74.3 % (ref 44–72)
NEUTROPHILS NFR BLD: 74.4 % (ref 44–72)
NEUTROPHILS NFR BLD: 77.9 % (ref 44–72)
NEUTROPHILS NFR BLD: 78.8 % (ref 44–72)
NEUTROPHILS NFR BLD: 82 % (ref 44–72)
NEUTROPHILS NFR BLD: 82.8 % (ref 44–72)
NEUTROPHILS NFR BLD: 82.8 % (ref 44–72)
NEUTROPHILS NFR FLD: 18 %
NITRITE UR QL STRIP.AUTO: NEGATIVE
NRBC # BLD AUTO: 0 K/UL
NRBC BLD-RTO: 0 /100 WBC
O2/TOTAL GAS SETTING VFR VENT: 100 %
O2/TOTAL GAS SETTING VFR VENT: 30 %
O2/TOTAL GAS SETTING VFR VENT: 60 %
PA AA BLD-ACNC: 6.8 %
PA ADP BLD-ACNC: 36.2 %
PCO2 BLDA: 23.3 MMHG (ref 26–37)
PCO2 BLDA: 28.3 MMHG (ref 26–37)
PCO2 BLDA: 31.9 MMHG (ref 26–37)
PCO2 BLDA: 36.9 MMHG (ref 26–37)
PCO2 TEMP ADJ BLDA: 24.6 MMHG (ref 26–37)
PCO2 TEMP ADJ BLDA: 31 MMHG (ref 26–37)
PCO2 TEMP ADJ BLDA: 37.4 MMHG (ref 26–37)
PH BLDA: 7.35 [PH] (ref 7.4–7.5)
PH BLDA: 7.39 [PH] (ref 7.4–7.5)
PH BLDA: 7.46 [PH] (ref 7.4–7.5)
PH BLDA: 7.48 [PH] (ref 7.4–7.5)
PH TEMP ADJ BLDA: 7.35 [PH] (ref 7.4–7.5)
PH TEMP ADJ BLDA: 7.4 [PH] (ref 7.4–7.5)
PH TEMP ADJ BLDA: 7.44 [PH] (ref 7.4–7.5)
PH UR STRIP.AUTO: 6 [PH]
PHOSPHATE SERPL-MCNC: 6 MG/DL (ref 2.5–4.5)
PHOSPHATE SERPL-MCNC: 6.6 MG/DL (ref 2.5–4.5)
PHOSPHATE SERPL-MCNC: 7.9 MG/DL (ref 2.5–4.5)
PHOSPHATE SERPL-MCNC: 8.7 MG/DL (ref 2.5–4.5)
PLATELET # BLD AUTO: 108 K/UL (ref 164–446)
PLATELET # BLD AUTO: 113 K/UL (ref 164–446)
PLATELET # BLD AUTO: 136 K/UL (ref 164–446)
PLATELET # BLD AUTO: 140 K/UL (ref 164–446)
PLATELET # BLD AUTO: 145 K/UL (ref 164–446)
PLATELET # BLD AUTO: 160 K/UL (ref 164–446)
PLATELET # BLD AUTO: 167 K/UL (ref 164–446)
PLATELET # BLD AUTO: 173 K/UL (ref 164–446)
PLATELET # BLD AUTO: 189 K/UL (ref 164–446)
PLATELET # BLD AUTO: 199 K/UL (ref 164–446)
PMV BLD AUTO: 11.3 FL (ref 9–12.9)
PMV BLD AUTO: 11.4 FL (ref 9–12.9)
PMV BLD AUTO: 11.6 FL (ref 9–12.9)
PMV BLD AUTO: 12.3 FL (ref 9–12.9)
PMV BLD AUTO: 12.4 FL (ref 9–12.9)
PMV BLD AUTO: 12.4 FL (ref 9–12.9)
PMV BLD AUTO: 12.5 FL (ref 9–12.9)
PMV BLD AUTO: 12.8 FL (ref 9–12.9)
PMV BLD AUTO: 13.3 FL (ref 9–12.9)
PO2 BLDA: 222 MMHG (ref 64–87)
PO2 BLDA: 225 MMHG (ref 64–87)
PO2 BLDA: 80.4 MMHG (ref 64–87)
PO2 BLDA: 95 MMHG (ref 64–87)
PO2 TEMP ADJ BLDA: 102 MMHG (ref 64–87)
PO2 TEMP ADJ BLDA: 222 MMHG (ref 64–87)
PO2 TEMP ADJ BLDA: 224 MMHG (ref 64–87)
POTASSIUM SERPL-SCNC: 4.1 MMOL/L (ref 3.6–5.5)
POTASSIUM SERPL-SCNC: 4.2 MMOL/L (ref 3.6–5.5)
POTASSIUM SERPL-SCNC: 4.3 MMOL/L (ref 3.6–5.5)
POTASSIUM SERPL-SCNC: 4.3 MMOL/L (ref 3.6–5.5)
POTASSIUM SERPL-SCNC: 4.5 MMOL/L (ref 3.6–5.5)
POTASSIUM SERPL-SCNC: 4.6 MMOL/L (ref 3.6–5.5)
POTASSIUM SERPL-SCNC: 4.6 MMOL/L (ref 3.6–5.5)
POTASSIUM SERPL-SCNC: 4.7 MMOL/L (ref 3.6–5.5)
POTASSIUM SERPL-SCNC: 4.7 MMOL/L (ref 3.6–5.5)
POTASSIUM SERPL-SCNC: 4.9 MMOL/L (ref 3.6–5.5)
POTASSIUM SERPL-SCNC: 5 MMOL/L (ref 3.6–5.5)
POTASSIUM SERPL-SCNC: 5.1 MMOL/L (ref 3.6–5.5)
PREALB SERPL-MCNC: 20 MG/DL (ref 18–38)
PREALB SERPL-MCNC: 21 MG/DL (ref 18–38)
PREALB SERPL-MCNC: 22 MG/DL (ref 18–38)
PROT SERPL-MCNC: 6.2 G/DL (ref 6–8.2)
PROT SERPL-MCNC: 6.9 G/DL (ref 6–8.2)
PROT SERPL-MCNC: 7 G/DL (ref 6–8.2)
PROT SERPL-MCNC: 7.2 G/DL (ref 6–8.2)
PROT SERPL-MCNC: 7.2 G/DL (ref 6–8.2)
PROT SERPL-MCNC: 7.3 G/DL (ref 6–8.2)
PROT SERPL-MCNC: 7.4 G/DL (ref 6–8.2)
PROT SERPL-MCNC: 7.4 G/DL (ref 6–8.2)
PROT SERPL-MCNC: 7.8 G/DL (ref 6–8.2)
PROT SERPL-MCNC: 8.1 G/DL (ref 6–8.2)
PROT UR QL STRIP: 300 MG/DL
PROTHROMBIN TIME: 16.1 SEC (ref 12–14.6)
PROTHROMBIN TIME: 16.3 SEC (ref 12–14.6)
PROTHROMBIN TIME: 16.4 SEC (ref 12–14.6)
PROTHROMBIN TIME: 17.3 SEC (ref 12–14.6)
PROTHROMBIN TIME: 17.5 SEC (ref 12–14.6)
PROTHROMBIN TIME: 17.9 SEC (ref 12–14.6)
PROTHROMBIN TIME: 18.4 SEC (ref 12–14.6)
PROTHROMBIN TIME: 24.7 SEC (ref 12–14.6)
PROTHROMBIN TIME: 25.1 SEC (ref 12–14.6)
PROTHROMBIN TIME: 32.9 SEC (ref 12–14.6)
PROTHROMBIN TIME: 44.8 SEC (ref 12–14.6)
PROTHROMBIN TIME: 50.3 SEC (ref 12–14.6)
RBC # BLD AUTO: 3.55 M/UL (ref 4.7–6.1)
RBC # BLD AUTO: 4.08 M/UL (ref 4.7–6.1)
RBC # BLD AUTO: 4.19 M/UL (ref 4.7–6.1)
RBC # BLD AUTO: 4.19 M/UL (ref 4.7–6.1)
RBC # BLD AUTO: 4.27 M/UL (ref 4.7–6.1)
RBC # BLD AUTO: 4.33 M/UL (ref 4.7–6.1)
RBC # BLD AUTO: 4.44 M/UL (ref 4.7–6.1)
RBC # BLD AUTO: 4.63 M/UL (ref 4.7–6.1)
RBC # BLD AUTO: 5.02 M/UL (ref 4.7–6.1)
RBC # FLD: <2000 CELLS/UL
RBC # URNS HPF: ABNORMAL /HPF
RBC UR QL AUTO: ABNORMAL
RH BLD: NORMAL
RH BLD: NORMAL
RHODAMINE-AURAMINE STN SPEC: NORMAL
SAO2 % BLDA: 100 % (ref 93–99)
SAO2 % BLDA: 100 % (ref 93–99)
SAO2 % BLDA: 95.5 % (ref 93–99)
SAO2 % BLDA: 98 % (ref 93–99)
SIGNIFICANT IND 70042: NORMAL
SITE SITE: NORMAL
SODIUM SERPL-SCNC: 135 MMOL/L (ref 135–145)
SODIUM SERPL-SCNC: 137 MMOL/L (ref 135–145)
SODIUM SERPL-SCNC: 137 MMOL/L (ref 135–145)
SODIUM SERPL-SCNC: 139 MMOL/L (ref 135–145)
SODIUM SERPL-SCNC: 139 MMOL/L (ref 135–145)
SODIUM SERPL-SCNC: 140 MMOL/L (ref 135–145)
SODIUM SERPL-SCNC: 140 MMOL/L (ref 135–145)
SODIUM SERPL-SCNC: 141 MMOL/L (ref 135–145)
SODIUM SERPL-SCNC: 142 MMOL/L (ref 135–145)
SODIUM SERPL-SCNC: 142 MMOL/L (ref 135–145)
SODIUM SERPL-SCNC: 143 MMOL/L (ref 135–145)
SODIUM SERPL-SCNC: 143 MMOL/L (ref 135–145)
SOURCE SOURCE: NORMAL
SP GR UR STRIP.AUTO: 1.04
SPECIMEN DRAWN FROM PATIENT: ABNORMAL
TEG ALGORITHM TGALG: ABNORMAL
TRIGL SERPL-MCNC: 186 MG/DL (ref 0–149)
TRIGL SERPL-MCNC: 195 MG/DL (ref 0–149)
TRIGL SERPL-MCNC: 275 MG/DL (ref 0–149)
TROPONIN I SERPL-MCNC: 0.82 NG/ML (ref 0–0.04)
TSH SERPL DL<=0.005 MIU/L-ACNC: 2.78 UIU/ML (ref 0.38–5.33)
UROBILINOGEN UR STRIP.AUTO-MCNC: 0.2 MG/DL
WBC # BLD AUTO: 10.7 K/UL (ref 4.8–10.8)
WBC # BLD AUTO: 10.8 K/UL (ref 4.8–10.8)
WBC # BLD AUTO: 11.5 K/UL (ref 4.8–10.8)
WBC # BLD AUTO: 11.6 K/UL (ref 4.8–10.8)
WBC # BLD AUTO: 12.6 K/UL (ref 4.8–10.8)
WBC # BLD AUTO: 13.7 K/UL (ref 4.8–10.8)
WBC # BLD AUTO: 8.5 K/UL (ref 4.8–10.8)
WBC # BLD AUTO: 8.9 K/UL (ref 4.8–10.8)
WBC # BLD AUTO: 9.1 K/UL (ref 4.8–10.8)
WBC # FLD: 18 CELLS/UL
WBC #/AREA URNS HPF: ABNORMAL /HPF

## 2019-01-01 PROCEDURE — A9270 NON-COVERED ITEM OR SERVICE: HCPCS | Performed by: INTERNAL MEDICINE

## 2019-01-01 PROCEDURE — 87186 SC STD MICRODIL/AGAR DIL: CPT

## 2019-01-01 PROCEDURE — 93005 ELECTROCARDIOGRAM TRACING: CPT | Performed by: INTERNAL MEDICINE

## 2019-01-01 PROCEDURE — 90945 DIALYSIS ONE EVALUATION: CPT

## 2019-01-01 PROCEDURE — 71045 X-RAY EXAM CHEST 1 VIEW: CPT

## 2019-01-01 PROCEDURE — 36415 COLL VENOUS BLD VENIPUNCTURE: CPT

## 2019-01-01 PROCEDURE — 85610 PROTHROMBIN TIME: CPT

## 2019-01-01 PROCEDURE — 700101 HCHG RX REV CODE 250: Performed by: INTERNAL MEDICINE

## 2019-01-01 PROCEDURE — 0B9J8ZX DRAINAGE OF LEFT LOWER LUNG LOBE, VIA NATURAL OR ARTIFICIAL OPENING ENDOSCOPIC, DIAGNOSTIC: ICD-10-PCS | Performed by: INTERNAL MEDICINE

## 2019-01-01 PROCEDURE — 85025 COMPLETE CBC W/AUTO DIFF WBC: CPT

## 2019-01-01 PROCEDURE — 99233 SBSQ HOSP IP/OBS HIGH 50: CPT | Mod: GC | Performed by: INTERNAL MEDICINE

## 2019-01-01 PROCEDURE — 99232 SBSQ HOSP IP/OBS MODERATE 35: CPT | Performed by: PSYCHIATRY & NEUROLOGY

## 2019-01-01 PROCEDURE — 87102 FUNGUS ISOLATION CULTURE: CPT

## 2019-01-01 PROCEDURE — 700102 HCHG RX REV CODE 250 W/ 637 OVERRIDE(OP): Performed by: INTERNAL MEDICINE

## 2019-01-01 PROCEDURE — 88305 TISSUE EXAM BY PATHOLOGIST: CPT

## 2019-01-01 PROCEDURE — 87206 SMEAR FLUORESCENT/ACID STAI: CPT

## 2019-01-01 PROCEDURE — 94760 N-INVAS EAR/PLS OXIMETRY 1: CPT

## 2019-01-01 PROCEDURE — 99291 CRITICAL CARE FIRST HOUR: CPT | Performed by: INTERNAL MEDICINE

## 2019-01-01 PROCEDURE — 5A1D70Z PERFORMANCE OF URINARY FILTRATION, INTERMITTENT, LESS THAN 6 HOURS PER DAY: ICD-10-PCS | Performed by: INTERNAL MEDICINE

## 2019-01-01 PROCEDURE — 83605 ASSAY OF LACTIC ACID: CPT

## 2019-01-01 PROCEDURE — 770001 HCHG ROOM/CARE - MED/SURG/GYN PRIV*

## 2019-01-01 PROCEDURE — 700101 HCHG RX REV CODE 250: Performed by: NEUROLOGICAL SURGERY

## 2019-01-01 PROCEDURE — 97162 PT EVAL MOD COMPLEX 30 MIN: CPT

## 2019-01-01 PROCEDURE — 70450 CT HEAD/BRAIN W/O DYE: CPT

## 2019-01-01 PROCEDURE — 302136 NUTRITION PUMP: Performed by: INTERNAL MEDICINE

## 2019-01-01 PROCEDURE — 99232 SBSQ HOSP IP/OBS MODERATE 35: CPT | Mod: GC | Performed by: INTERNAL MEDICINE

## 2019-01-01 PROCEDURE — 82962 GLUCOSE BLOOD TEST: CPT

## 2019-01-01 PROCEDURE — 700102 HCHG RX REV CODE 250 W/ 637 OVERRIDE(OP): Performed by: STUDENT IN AN ORGANIZED HEALTH CARE EDUCATION/TRAINING PROGRAM

## 2019-01-01 PROCEDURE — 92526 ORAL FUNCTION THERAPY: CPT

## 2019-01-01 PROCEDURE — 94002 VENT MGMT INPAT INIT DAY: CPT

## 2019-01-01 PROCEDURE — 700111 HCHG RX REV CODE 636 W/ 250 OVERRIDE (IP): Performed by: STUDENT IN AN ORGANIZED HEALTH CARE EDUCATION/TRAINING PROGRAM

## 2019-01-01 PROCEDURE — 87015 SPECIMEN INFECT AGNT CONCNTJ: CPT

## 2019-01-01 PROCEDURE — 80053 COMPREHEN METABOLIC PANEL: CPT

## 2019-01-01 PROCEDURE — 31500 INSERT EMERGENCY AIRWAY: CPT | Performed by: INTERNAL MEDICINE

## 2019-01-01 PROCEDURE — 87205 SMEAR GRAM STAIN: CPT

## 2019-01-01 PROCEDURE — 93005 ELECTROCARDIOGRAM TRACING: CPT | Performed by: EMERGENCY MEDICINE

## 2019-01-01 PROCEDURE — 70496 CT ANGIOGRAPHY HEAD: CPT

## 2019-01-01 PROCEDURE — 85576 BLOOD PLATELET AGGREGATION: CPT

## 2019-01-01 PROCEDURE — 87040 BLOOD CULTURE FOR BACTERIA: CPT | Mod: 91

## 2019-01-01 PROCEDURE — 99153 MOD SED SAME PHYS/QHP EA: CPT

## 2019-01-01 PROCEDURE — 85730 THROMBOPLASTIN TIME PARTIAL: CPT

## 2019-01-01 PROCEDURE — 700117 HCHG RX CONTRAST REV CODE 255: Performed by: PSYCHIATRY & NEUROLOGY

## 2019-01-01 PROCEDURE — 700105 HCHG RX REV CODE 258: Performed by: STUDENT IN AN ORGANIZED HEALTH CARE EDUCATION/TRAINING PROGRAM

## 2019-01-01 PROCEDURE — 302214 INTUBATION BOX: Performed by: INTERNAL MEDICINE

## 2019-01-01 PROCEDURE — 87040 BLOOD CULTURE FOR BACTERIA: CPT

## 2019-01-01 PROCEDURE — 86870 RBC ANTIBODY IDENTIFICATION: CPT

## 2019-01-01 PROCEDURE — 94003 VENT MGMT INPAT SUBQ DAY: CPT

## 2019-01-01 PROCEDURE — 700105 HCHG RX REV CODE 258: Performed by: INTERNAL MEDICINE

## 2019-01-01 PROCEDURE — C9132 KCENTRA, PER I.U.: HCPCS | Mod: JG | Performed by: INTERNAL MEDICINE

## 2019-01-01 PROCEDURE — 302977 HCHG BRONCHOSCOPY PROC-THERAPEUTIC

## 2019-01-01 PROCEDURE — 88112 CYTOPATH CELL ENHANCE TECH: CPT

## 2019-01-01 PROCEDURE — 0B9F8ZZ DRAINAGE OF RIGHT LOWER LUNG LOBE, VIA NATURAL OR ARTIFICIAL OPENING ENDOSCOPIC: ICD-10-PCS | Performed by: INTERNAL MEDICINE

## 2019-01-01 PROCEDURE — 3E1M39Z IRRIGATION OF PERITONEAL CAVITY USING DIALYSATE, PERCUTANEOUS APPROACH: ICD-10-PCS | Performed by: INTERNAL MEDICINE

## 2019-01-01 PROCEDURE — 86140 C-REACTIVE PROTEIN: CPT

## 2019-01-01 PROCEDURE — B548ZZA ULTRASONOGRAPHY OF SUPERIOR VENA CAVA, GUIDANCE: ICD-10-PCS | Performed by: RADIOLOGY

## 2019-01-01 PROCEDURE — 87070 CULTURE OTHR SPECIMN AEROBIC: CPT

## 2019-01-01 PROCEDURE — 5A1945Z RESPIRATORY VENTILATION, 24-96 CONSECUTIVE HOURS: ICD-10-PCS | Performed by: INTERNAL MEDICINE

## 2019-01-01 PROCEDURE — 770022 HCHG ROOM/CARE - ICU (200)

## 2019-01-01 PROCEDURE — 80061 LIPID PANEL: CPT

## 2019-01-01 PROCEDURE — 82962 GLUCOSE BLOOD TEST: CPT | Mod: 91

## 2019-01-01 PROCEDURE — 97535 SELF CARE MNGMENT TRAINING: CPT

## 2019-01-01 PROCEDURE — A9270 NON-COVERED ITEM OR SERVICE: HCPCS | Performed by: STUDENT IN AN ORGANIZED HEALTH CARE EDUCATION/TRAINING PROGRAM

## 2019-01-01 PROCEDURE — 99291 CRITICAL CARE FIRST HOUR: CPT | Mod: 25 | Performed by: INTERNAL MEDICINE

## 2019-01-01 PROCEDURE — 99232 SBSQ HOSP IP/OBS MODERATE 35: CPT | Mod: GC | Performed by: STUDENT IN AN ORGANIZED HEALTH CARE EDUCATION/TRAINING PROGRAM

## 2019-01-01 PROCEDURE — 84134 ASSAY OF PREALBUMIN: CPT

## 2019-01-01 PROCEDURE — 80048 BASIC METABOLIC PNL TOTAL CA: CPT

## 2019-01-01 PROCEDURE — 93010 ELECTROCARDIOGRAM REPORT: CPT | Performed by: INTERNAL MEDICINE

## 2019-01-01 PROCEDURE — 0042T CT-CEREBRAL PERFUSION ANALYSIS: CPT

## 2019-01-01 PROCEDURE — 700111 HCHG RX REV CODE 636 W/ 250 OVERRIDE (IP): Performed by: INTERNAL MEDICINE

## 2019-01-01 PROCEDURE — 36600 WITHDRAWAL OF ARTERIAL BLOOD: CPT

## 2019-01-01 PROCEDURE — 86850 RBC ANTIBODY SCREEN: CPT

## 2019-01-01 PROCEDURE — 94640 AIRWAY INHALATION TREATMENT: CPT

## 2019-01-01 PROCEDURE — 99232 SBSQ HOSP IP/OBS MODERATE 35: CPT | Performed by: INTERNAL MEDICINE

## 2019-01-01 PROCEDURE — 97166 OT EVAL MOD COMPLEX 45 MIN: CPT

## 2019-01-01 PROCEDURE — 0B9J8ZZ DRAINAGE OF LEFT LOWER LUNG LOBE, VIA NATURAL OR ARTIFICIAL OPENING ENDOSCOPIC: ICD-10-PCS | Performed by: INTERNAL MEDICINE

## 2019-01-01 PROCEDURE — 87522 HEPATITIS C REVRS TRNSCRPJ: CPT

## 2019-01-01 PROCEDURE — 83036 HEMOGLOBIN GLYCOSYLATED A1C: CPT

## 2019-01-01 PROCEDURE — B518ZZA FLUOROSCOPY OF SUPERIOR VENA CAVA, GUIDANCE: ICD-10-PCS | Performed by: RADIOLOGY

## 2019-01-01 PROCEDURE — 86706 HEP B SURFACE ANTIBODY: CPT

## 2019-01-01 PROCEDURE — 31500 INSERT EMERGENCY AIRWAY: CPT

## 2019-01-01 PROCEDURE — 31624 DX BRONCHOSCOPE/LAVAGE: CPT | Performed by: INTERNAL MEDICINE

## 2019-01-01 PROCEDURE — 99223 1ST HOSP IP/OBS HIGH 75: CPT | Performed by: INTERNAL MEDICINE

## 2019-01-01 PROCEDURE — 93005 ELECTROCARDIOGRAM TRACING: CPT | Performed by: STUDENT IN AN ORGANIZED HEALTH CARE EDUCATION/TRAINING PROGRAM

## 2019-01-01 PROCEDURE — 0BH17EZ INSERTION OF ENDOTRACHEAL AIRWAY INTO TRACHEA, VIA NATURAL OR ARTIFICIAL OPENING: ICD-10-PCS | Performed by: INTERNAL MEDICINE

## 2019-01-01 PROCEDURE — 82803 BLOOD GASES ANY COMBINATION: CPT

## 2019-01-01 PROCEDURE — 85049 AUTOMATED PLATELET COUNT: CPT

## 2019-01-01 PROCEDURE — 700111 HCHG RX REV CODE 636 W/ 250 OVERRIDE (IP)

## 2019-01-01 PROCEDURE — 83735 ASSAY OF MAGNESIUM: CPT

## 2019-01-01 PROCEDURE — 70498 CT ANGIOGRAPHY NECK: CPT

## 2019-01-01 PROCEDURE — 84443 ASSAY THYROID STIM HORMONE: CPT

## 2019-01-01 PROCEDURE — 86905 BLOOD TYPING RBC ANTIGENS: CPT

## 2019-01-01 PROCEDURE — 97530 THERAPEUTIC ACTIVITIES: CPT

## 2019-01-01 PROCEDURE — 87077 CULTURE AEROBIC IDENTIFY: CPT | Mod: 91

## 2019-01-01 PROCEDURE — 84100 ASSAY OF PHOSPHORUS: CPT

## 2019-01-01 PROCEDURE — 99223 1ST HOSP IP/OBS HIGH 75: CPT | Mod: GC | Performed by: INTERNAL MEDICINE

## 2019-01-01 PROCEDURE — 80074 ACUTE HEPATITIS PANEL: CPT

## 2019-01-01 PROCEDURE — 770020 HCHG ROOM/CARE - TELE (206)

## 2019-01-01 PROCEDURE — 81001 URINALYSIS AUTO W/SCOPE: CPT

## 2019-01-01 PROCEDURE — 74176 CT ABD & PELVIS W/O CONTRAST: CPT

## 2019-01-01 PROCEDURE — 85347 COAGULATION TIME ACTIVATED: CPT

## 2019-01-01 PROCEDURE — 700117 HCHG RX CONTRAST REV CODE 255: Performed by: INTERNAL MEDICINE

## 2019-01-01 PROCEDURE — 89051 BODY FLUID CELL COUNT: CPT

## 2019-01-01 PROCEDURE — 99223 1ST HOSP IP/OBS HIGH 75: CPT | Performed by: PSYCHIATRY & NEUROLOGY

## 2019-01-01 PROCEDURE — 92610 EVALUATE SWALLOWING FUNCTION: CPT

## 2019-01-01 PROCEDURE — 700105 HCHG RX REV CODE 258: Performed by: NEUROLOGICAL SURGERY

## 2019-01-01 PROCEDURE — 90935 HEMODIALYSIS ONE EVALUATION: CPT

## 2019-01-01 PROCEDURE — C1752 CATH,HEMODIALYSIS,SHORT-TERM: HCPCS

## 2019-01-01 PROCEDURE — 99292 CRITICAL CARE ADDL 30 MIN: CPT | Performed by: INTERNAL MEDICINE

## 2019-01-01 PROCEDURE — 700117 HCHG RX CONTRAST REV CODE 255: Performed by: EMERGENCY MEDICINE

## 2019-01-01 PROCEDURE — 99285 EMERGENCY DEPT VISIT HI MDM: CPT

## 2019-01-01 PROCEDURE — 85384 FIBRINOGEN ACTIVITY: CPT

## 2019-01-01 PROCEDURE — 31720 CLEARANCE OF AIRWAYS: CPT

## 2019-01-01 PROCEDURE — 31645 BRNCHSC W/THER ASPIR 1ST: CPT | Performed by: INTERNAL MEDICINE

## 2019-01-01 PROCEDURE — 84478 ASSAY OF TRIGLYCERIDES: CPT

## 2019-01-01 PROCEDURE — 86900 BLOOD TYPING SEROLOGIC ABO: CPT

## 2019-01-01 PROCEDURE — 86901 BLOOD TYPING SEROLOGIC RH(D): CPT

## 2019-01-01 PROCEDURE — 99292 CRITICAL CARE ADDL 30 MIN: CPT | Mod: 25 | Performed by: INTERNAL MEDICINE

## 2019-01-01 PROCEDURE — 02HV33Z INSERTION OF INFUSION DEVICE INTO SUPERIOR VENA CAVA, PERCUTANEOUS APPROACH: ICD-10-PCS | Performed by: RADIOLOGY

## 2019-01-01 PROCEDURE — 87116 MYCOBACTERIA CULTURE: CPT

## 2019-01-01 PROCEDURE — 84484 ASSAY OF TROPONIN QUANT: CPT

## 2019-01-01 PROCEDURE — 85025 COMPLETE CBC W/AUTO DIFF WBC: CPT | Mod: 91

## 2019-01-01 PROCEDURE — 99152 MOD SED SAME PHYS/QHP 5/>YRS: CPT

## 2019-01-01 RX ORDER — ONDANSETRON 2 MG/ML
4 INJECTION INTRAMUSCULAR; INTRAVENOUS EVERY 4 HOURS PRN
Status: DISCONTINUED | OUTPATIENT
Start: 2019-01-01 | End: 2019-01-01

## 2019-01-01 RX ORDER — CHOLECALCIFEROL (VITAMIN D3) 125 MCG
500 CAPSULE ORAL DAILY
Status: DISCONTINUED | OUTPATIENT
Start: 2019-01-01 | End: 2019-01-01

## 2019-01-01 RX ORDER — CHOLECALCIFEROL (VITAMIN D3) 125 MCG
500 CAPSULE ORAL DAILY
Status: DISCONTINUED | OUTPATIENT
Start: 2019-01-01 | End: 2019-01-01 | Stop reason: HOSPADM

## 2019-01-01 RX ORDER — AMOXICILLIN 250 MG
2 CAPSULE ORAL 2 TIMES DAILY
Status: DISCONTINUED | OUTPATIENT
Start: 2019-01-01 | End: 2019-01-01

## 2019-01-01 RX ORDER — CHOLECALCIFEROL (VITAMIN D3) 125 MCG
500 CAPSULE ORAL DAILY
COMMUNITY

## 2019-01-01 RX ORDER — ATORVASTATIN CALCIUM 40 MG/1
40 TABLET, FILM COATED ORAL NIGHTLY
Status: DISCONTINUED | OUTPATIENT
Start: 2019-01-01 | End: 2019-01-01 | Stop reason: HOSPADM

## 2019-01-01 RX ORDER — CALCITRIOL 0.5 UG/1
0.5 CAPSULE, LIQUID FILLED ORAL
Status: DISCONTINUED | OUTPATIENT
Start: 2019-01-01 | End: 2019-01-01

## 2019-01-01 RX ORDER — WARFARIN SODIUM 5 MG/1
5 TABLET ORAL
Status: COMPLETED | OUTPATIENT
Start: 2019-01-01 | End: 2019-01-01

## 2019-01-01 RX ORDER — ANALGESIC BALM 1.74; 4.06 G/29G; G/29G
OINTMENT TOPICAL 3 TIMES DAILY PRN
Status: DISCONTINUED | OUTPATIENT
Start: 2019-01-01 | End: 2019-01-01 | Stop reason: HOSPADM

## 2019-01-01 RX ORDER — DEXTROSE MONOHYDRATE 25 G/50ML
25 INJECTION, SOLUTION INTRAVENOUS
Status: DISCONTINUED | OUTPATIENT
Start: 2019-01-01 | End: 2019-01-01

## 2019-01-01 RX ORDER — ACETAMINOPHEN 325 MG/1
650 TABLET ORAL EVERY 6 HOURS PRN
Status: DISCONTINUED | OUTPATIENT
Start: 2019-01-01 | End: 2019-01-01

## 2019-01-01 RX ORDER — MIDAZOLAM HYDROCHLORIDE 1 MG/ML
.5-2 INJECTION INTRAMUSCULAR; INTRAVENOUS PRN
Status: ACTIVE | OUTPATIENT
Start: 2019-01-01 | End: 2019-01-01

## 2019-01-01 RX ORDER — SEVELAMER CARBONATE 800 MG/1
800 TABLET, FILM COATED ORAL
Status: DISCONTINUED | OUTPATIENT
Start: 2019-01-01 | End: 2019-01-01

## 2019-01-01 RX ORDER — NIMODIPINE 30 MG/1
60 CAPSULE, LIQUID FILLED ORAL EVERY 4 HOURS
Status: DISCONTINUED | OUTPATIENT
Start: 2019-01-01 | End: 2019-01-01

## 2019-01-01 RX ORDER — LISINOPRIL 2.5 MG/1
2.5 TABLET ORAL
COMMUNITY

## 2019-01-01 RX ORDER — DEXTROSE MONOHYDRATE 25 G/50ML
25 INJECTION, SOLUTION INTRAVENOUS
Status: DISCONTINUED | OUTPATIENT
Start: 2019-01-01 | End: 2019-01-01 | Stop reason: HOSPADM

## 2019-01-01 RX ORDER — CALCITRIOL 0.25 UG/1
0.25 CAPSULE, LIQUID FILLED ORAL
Status: DISCONTINUED | OUTPATIENT
Start: 2019-01-01 | End: 2019-01-01

## 2019-01-01 RX ORDER — ACETAMINOPHEN 650 MG/1
650 SUPPOSITORY RECTAL EVERY 4 HOURS PRN
Status: DISCONTINUED | OUTPATIENT
Start: 2019-01-01 | End: 2019-01-01

## 2019-01-01 RX ORDER — AMOXICILLIN 250 MG
2 CAPSULE ORAL 2 TIMES DAILY
Status: DISCONTINUED | OUTPATIENT
Start: 2019-01-01 | End: 2019-01-01 | Stop reason: HOSPADM

## 2019-01-01 RX ORDER — ONDANSETRON 2 MG/ML
4 INJECTION INTRAMUSCULAR; INTRAVENOUS PRN
Status: ACTIVE | OUTPATIENT
Start: 2019-01-01 | End: 2019-01-01

## 2019-01-01 RX ORDER — FAMOTIDINE 20 MG/1
20 TABLET, FILM COATED ORAL 2 TIMES DAILY
Status: DISCONTINUED | OUTPATIENT
Start: 2019-01-01 | End: 2019-01-01

## 2019-01-01 RX ORDER — POLYETHYLENE GLYCOL 3350 17 G/17G
1 POWDER, FOR SOLUTION ORAL
Status: DISCONTINUED | OUTPATIENT
Start: 2019-01-01 | End: 2019-01-01 | Stop reason: HOSPADM

## 2019-01-01 RX ORDER — WARFARIN SODIUM 5 MG/1
5 TABLET ORAL
Status: DISCONTINUED | OUTPATIENT
Start: 2019-01-01 | End: 2019-01-01

## 2019-01-01 RX ORDER — CALCITRIOL 0.25 UG/1
0.5 CAPSULE, LIQUID FILLED ORAL
Status: DISCONTINUED | OUTPATIENT
Start: 2019-01-01 | End: 2019-01-01

## 2019-01-01 RX ORDER — TRAZODONE HYDROCHLORIDE 50 MG/1
50 TABLET ORAL NIGHTLY
COMMUNITY

## 2019-01-01 RX ORDER — ACETAMINOPHEN 325 MG/1
650 TABLET ORAL EVERY 4 HOURS PRN
Status: DISCONTINUED | OUTPATIENT
Start: 2019-01-01 | End: 2019-01-01

## 2019-01-01 RX ORDER — ROCURONIUM BROMIDE 10 MG/ML
60 INJECTION, SOLUTION INTRAVENOUS ONCE
Status: COMPLETED | OUTPATIENT
Start: 2019-01-01 | End: 2019-01-01

## 2019-01-01 RX ORDER — LEVETIRACETAM 250 MG/1
500 TABLET ORAL 2 TIMES DAILY
Status: DISCONTINUED | OUTPATIENT
Start: 2019-01-01 | End: 2019-01-01

## 2019-01-01 RX ORDER — ACETAMINOPHEN 650 MG/1
650 SUPPOSITORY RECTAL EVERY 4 HOURS PRN
Status: DISCONTINUED | OUTPATIENT
Start: 2019-01-01 | End: 2019-01-01 | Stop reason: HOSPADM

## 2019-01-01 RX ORDER — BISACODYL 10 MG
10 SUPPOSITORY, RECTAL RECTAL
Status: DISCONTINUED | OUTPATIENT
Start: 2019-01-01 | End: 2019-01-01

## 2019-01-01 RX ORDER — LEVETIRACETAM 500 MG/1
500 TABLET ORAL 2 TIMES DAILY
Status: DISCONTINUED | OUTPATIENT
Start: 2019-01-01 | End: 2019-01-01

## 2019-01-01 RX ORDER — AMLODIPINE BESYLATE 10 MG/1
10 TABLET ORAL DAILY
Qty: 30 TAB | Refills: 0 | Status: SHIPPED | OUTPATIENT
Start: 2019-01-01

## 2019-01-01 RX ORDER — ONDANSETRON 4 MG/1
4 TABLET, ORALLY DISINTEGRATING ORAL EVERY 4 HOURS PRN
Status: DISCONTINUED | OUTPATIENT
Start: 2019-01-01 | End: 2019-01-01

## 2019-01-01 RX ORDER — CALCITRIOL 0.5 UG/1
0.5 CAPSULE, LIQUID FILLED ORAL
Status: DISCONTINUED | OUTPATIENT
Start: 2019-01-01 | End: 2019-01-01 | Stop reason: HOSPADM

## 2019-01-01 RX ORDER — ONDANSETRON 2 MG/ML
4 INJECTION INTRAMUSCULAR; INTRAVENOUS EVERY 4 HOURS PRN
Status: DISCONTINUED | OUTPATIENT
Start: 2019-01-01 | End: 2019-01-01 | Stop reason: HOSPADM

## 2019-01-01 RX ORDER — ONDANSETRON 4 MG/1
4 TABLET, ORALLY DISINTEGRATING ORAL EVERY 4 HOURS PRN
Status: DISCONTINUED | OUTPATIENT
Start: 2019-01-01 | End: 2019-01-01 | Stop reason: HOSPADM

## 2019-01-01 RX ORDER — LISINOPRIL 5 MG/1
2.5 TABLET ORAL
Status: DISCONTINUED | OUTPATIENT
Start: 2019-01-01 | End: 2019-01-01

## 2019-01-01 RX ORDER — MIDAZOLAM HYDROCHLORIDE 1 MG/ML
INJECTION INTRAMUSCULAR; INTRAVENOUS
Status: COMPLETED
Start: 2019-01-01 | End: 2019-01-01

## 2019-01-01 RX ORDER — LIDOCAINE 50 MG/G
1 PATCH TOPICAL EVERY 24 HOURS
COMMUNITY

## 2019-01-01 RX ORDER — LEVOTHYROXINE SODIUM 0.12 MG/1
125 TABLET ORAL EVERY MORNING
Status: DISCONTINUED | OUTPATIENT
Start: 2019-01-01 | End: 2019-01-01

## 2019-01-01 RX ORDER — AMLODIPINE BESYLATE 10 MG/1
10 TABLET ORAL
Status: DISCONTINUED | OUTPATIENT
Start: 2019-01-01 | End: 2019-01-01

## 2019-01-01 RX ORDER — SODIUM CHLORIDE 9 MG/ML
500 INJECTION, SOLUTION INTRAVENOUS ONCE
Status: COMPLETED | OUTPATIENT
Start: 2019-01-01 | End: 2019-01-01

## 2019-01-01 RX ORDER — WARFARIN SODIUM 2.5 MG/1
2.5 TABLET ORAL
Status: DISCONTINUED | OUTPATIENT
Start: 2019-01-01 | End: 2019-01-01

## 2019-01-01 RX ORDER — INSULIN GLARGINE 100 [IU]/ML
15 INJECTION, SOLUTION SUBCUTANEOUS EVERY EVENING
Status: DISCONTINUED | OUTPATIENT
Start: 2019-01-01 | End: 2019-01-01

## 2019-01-01 RX ORDER — ANALGESIC BALM 1.74; 4.06 G/29G; G/29G
1 OINTMENT TOPICAL 3 TIMES DAILY PRN
Qty: 1 TUBE | Refills: 1 | Status: SHIPPED | OUTPATIENT
Start: 2019-01-01

## 2019-01-01 RX ORDER — PREGABALIN 75 MG/1
75 CAPSULE ORAL EVERY MORNING
Status: DISCONTINUED | OUTPATIENT
Start: 2019-01-01 | End: 2019-01-01

## 2019-01-01 RX ORDER — CALCITRIOL 0.25 UG/1
0.25 CAPSULE, LIQUID FILLED ORAL
Status: DISCONTINUED | OUTPATIENT
Start: 2019-01-01 | End: 2019-01-01 | Stop reason: HOSPADM

## 2019-01-01 RX ORDER — LEVOTHYROXINE SODIUM 0.12 MG/1
125 TABLET ORAL EVERY MORNING
Status: DISCONTINUED | OUTPATIENT
Start: 2019-01-01 | End: 2019-01-01 | Stop reason: HOSPADM

## 2019-01-01 RX ORDER — BISACODYL 10 MG
10 SUPPOSITORY, RECTAL RECTAL
Status: DISCONTINUED | OUTPATIENT
Start: 2019-01-01 | End: 2019-01-01 | Stop reason: HOSPADM

## 2019-01-01 RX ORDER — ERGOCALCIFEROL 1.25 MG/1
50000 CAPSULE ORAL
Status: DISCONTINUED | OUTPATIENT
Start: 2019-01-01 | End: 2019-01-01

## 2019-01-01 RX ORDER — POLYETHYLENE GLYCOL 3350 17 G/17G
1 POWDER, FOR SOLUTION ORAL
Status: DISCONTINUED | OUTPATIENT
Start: 2019-01-01 | End: 2019-01-01

## 2019-01-01 RX ORDER — HYDRALAZINE HYDROCHLORIDE 20 MG/ML
10 INJECTION INTRAMUSCULAR; INTRAVENOUS EVERY 6 HOURS PRN
Status: DISCONTINUED | OUTPATIENT
Start: 2019-01-01 | End: 2019-01-01

## 2019-01-01 RX ORDER — WARFARIN SODIUM 6 MG/1
6 TABLET ORAL
Status: DISCONTINUED | OUTPATIENT
Start: 2019-01-01 | End: 2019-01-01

## 2019-01-01 RX ORDER — ATORVASTATIN CALCIUM 80 MG/1
80 TABLET, FILM COATED ORAL NIGHTLY
Status: DISCONTINUED | OUTPATIENT
Start: 2019-01-01 | End: 2019-01-01

## 2019-01-01 RX ORDER — ETOMIDATE 2 MG/ML
20 INJECTION INTRAVENOUS ONCE
Status: COMPLETED | OUTPATIENT
Start: 2019-01-01 | End: 2019-01-01

## 2019-01-01 RX ORDER — LIDOCAINE 50 MG/G
1 PATCH TOPICAL EVERY 24 HOURS
Status: DISCONTINUED | OUTPATIENT
Start: 2019-01-01 | End: 2019-01-01 | Stop reason: HOSPADM

## 2019-01-01 RX ORDER — POLYVINYL ALCOHOL 14 MG/ML
1 SOLUTION/ DROPS OPHTHALMIC 4 TIMES DAILY PRN
Status: DISCONTINUED | OUTPATIENT
Start: 2019-01-01 | End: 2019-01-01

## 2019-01-01 RX ORDER — DEXTROSE MONOHYDRATE 25 G/50ML
12.5-25 INJECTION, SOLUTION INTRAVENOUS PRN
Status: DISCONTINUED | OUTPATIENT
Start: 2019-01-01 | End: 2019-01-01 | Stop reason: HOSPADM

## 2019-01-01 RX ORDER — SODIUM CHLORIDE 9 MG/ML
INJECTION, SOLUTION INTRAVENOUS
Status: DISCONTINUED
Start: 2019-01-01 | End: 2019-01-01

## 2019-01-01 RX ORDER — ACETAMINOPHEN 325 MG/1
650 TABLET ORAL EVERY 6 HOURS PRN
Qty: 30 TAB | Refills: 0 | Status: SHIPPED | OUTPATIENT
Start: 2019-01-01

## 2019-01-01 RX ORDER — SODIUM CHLORIDE 9 MG/ML
500 INJECTION, SOLUTION INTRAVENOUS
Status: ACTIVE | OUTPATIENT
Start: 2019-01-01 | End: 2019-01-01

## 2019-01-01 RX ORDER — HYDRALAZINE HYDROCHLORIDE 20 MG/ML
20 INJECTION INTRAMUSCULAR; INTRAVENOUS EVERY 6 HOURS PRN
Status: DISCONTINUED | OUTPATIENT
Start: 2019-01-01 | End: 2019-01-01

## 2019-01-01 RX ORDER — IPRATROPIUM BROMIDE AND ALBUTEROL SULFATE 2.5; .5 MG/3ML; MG/3ML
3 SOLUTION RESPIRATORY (INHALATION)
Status: DISCONTINUED | OUTPATIENT
Start: 2019-01-01 | End: 2019-01-01 | Stop reason: HOSPADM

## 2019-01-01 RX ORDER — INSULIN GLARGINE 100 [IU]/ML
10 INJECTION, SOLUTION SUBCUTANEOUS EVERY EVENING
Status: DISCONTINUED | OUTPATIENT
Start: 2019-01-01 | End: 2019-01-01

## 2019-01-01 RX ORDER — CALCITRIOL 0.25 UG/1
.25-.5 CAPSULE, LIQUID FILLED ORAL SEE ADMIN INSTRUCTIONS
Status: DISCONTINUED | OUTPATIENT
Start: 2019-01-01 | End: 2019-01-01

## 2019-01-01 RX ORDER — SEVELAMER HYDROCHLORIDE 800 MG/1
800 TABLET, FILM COATED ORAL
COMMUNITY

## 2019-01-01 RX ORDER — FAMOTIDINE 20 MG/1
20 TABLET, FILM COATED ORAL DAILY
Status: DISCONTINUED | OUTPATIENT
Start: 2019-01-01 | End: 2019-01-01 | Stop reason: HOSPADM

## 2019-01-01 RX ORDER — ACETAMINOPHEN 325 MG/1
650 TABLET ORAL EVERY 4 HOURS PRN
Status: DISCONTINUED | OUTPATIENT
Start: 2019-01-01 | End: 2019-01-01 | Stop reason: HOSPADM

## 2019-01-01 RX ORDER — HEPARIN SODIUM 1000 [USP'U]/ML
2600 INJECTION, SOLUTION INTRAVENOUS; SUBCUTANEOUS
Status: DISCONTINUED | OUTPATIENT
Start: 2019-01-01 | End: 2019-01-01

## 2019-01-01 RX ORDER — LEVETIRACETAM 500 MG/1
500 TABLET ORAL 2 TIMES DAILY
Status: DISCONTINUED | OUTPATIENT
Start: 2019-01-01 | End: 2019-01-01 | Stop reason: HOSPADM

## 2019-01-01 RX ORDER — FAMOTIDINE 20 MG/1
20 TABLET, FILM COATED ORAL EVERY 12 HOURS
Status: DISCONTINUED | OUTPATIENT
Start: 2019-01-01 | End: 2019-01-01

## 2019-01-01 RX ORDER — WARFARIN SODIUM 6 MG/1
6 TABLET ORAL
Status: COMPLETED | OUTPATIENT
Start: 2019-01-01 | End: 2019-01-01

## 2019-01-01 RX ORDER — FAMOTIDINE 20 MG/1
20 TABLET, FILM COATED ORAL 2 TIMES DAILY
Qty: 60 TAB | Refills: 0 | Status: SHIPPED | OUTPATIENT
Start: 2019-01-01

## 2019-01-01 RX ORDER — AMLODIPINE BESYLATE 10 MG/1
10 TABLET ORAL
Status: DISCONTINUED | OUTPATIENT
Start: 2019-01-01 | End: 2019-01-01 | Stop reason: HOSPADM

## 2019-01-01 RX ORDER — ATORVASTATIN CALCIUM 40 MG/1
40 TABLET, FILM COATED ORAL NIGHTLY
COMMUNITY

## 2019-01-01 RX ORDER — ATORVASTATIN CALCIUM 40 MG/1
40 TABLET, FILM COATED ORAL NIGHTLY
Status: DISCONTINUED | OUTPATIENT
Start: 2019-01-01 | End: 2019-01-01

## 2019-01-01 RX ORDER — CEFAZOLIN SODIUM 2 G/100ML
2 INJECTION, SOLUTION INTRAVENOUS ONCE
Status: COMPLETED | OUTPATIENT
Start: 2019-01-01 | End: 2019-01-01

## 2019-01-01 RX ORDER — LISINOPRIL 5 MG/1
5 TABLET ORAL
Status: DISCONTINUED | OUTPATIENT
Start: 2019-01-01 | End: 2019-01-01

## 2019-01-01 RX ADMIN — CEFAZOLIN SODIUM 2 G: 2 INJECTION, SOLUTION INTRAVENOUS at 14:46

## 2019-01-01 RX ADMIN — CYANOCOBALAMIN TAB 500 MCG 500 MCG: 500 TAB at 04:01

## 2019-01-01 RX ADMIN — IPRATROPIUM BROMIDE AND ALBUTEROL SULFATE 3 ML: .5; 3 SOLUTION RESPIRATORY (INHALATION) at 02:16

## 2019-01-01 RX ADMIN — IPRATROPIUM BROMIDE AND ALBUTEROL SULFATE 3 ML: .5; 3 SOLUTION RESPIRATORY (INHALATION) at 18:20

## 2019-01-01 RX ADMIN — IPRATROPIUM BROMIDE AND ALBUTEROL SULFATE 3 ML: .5; 3 SOLUTION RESPIRATORY (INHALATION) at 15:35

## 2019-01-01 RX ADMIN — ATORVASTATIN CALCIUM 40 MG: 40 TABLET, FILM COATED ORAL at 20:12

## 2019-01-01 RX ADMIN — LIDOCAINE 1 PATCH: 50 PATCH TOPICAL at 06:16

## 2019-01-01 RX ADMIN — SODIUM CHLORIDE 12 MG/HR: 9 INJECTION, SOLUTION INTRAVENOUS at 11:10

## 2019-01-01 RX ADMIN — LISINOPRIL 2.5 MG: 5 TABLET ORAL at 04:20

## 2019-01-01 RX ADMIN — MIDAZOLAM HYDROCHLORIDE 1 MG: 1 INJECTION INTRAMUSCULAR; INTRAVENOUS at 13:30

## 2019-01-01 RX ADMIN — ACETAMINOPHEN 650 MG: 325 TABLET, FILM COATED ORAL at 20:00

## 2019-01-01 RX ADMIN — PHYTONADIONE 10 MG: 10 INJECTION, EMULSION INTRAMUSCULAR; INTRAVENOUS; SUBCUTANEOUS at 02:02

## 2019-01-01 RX ADMIN — HEPARIN SODIUM 1600 UNITS/HR: 5000 INJECTION, SOLUTION INTRAVENOUS at 14:12

## 2019-01-01 RX ADMIN — INSULIN LISPRO 2 UNITS: 100 INJECTION, SOLUTION INTRAVENOUS; SUBCUTANEOUS at 18:23

## 2019-01-01 RX ADMIN — CYANOCOBALAMIN TAB 500 MCG 500 MCG: 500 TAB at 05:33

## 2019-01-01 RX ADMIN — SODIUM CHLORIDE 3 G: 900 INJECTION INTRAVENOUS at 12:37

## 2019-01-01 RX ADMIN — CYANOCOBALAMIN TAB 500 MCG 500 MCG: 500 TAB at 06:13

## 2019-01-01 RX ADMIN — IOHEXOL 40 ML: 350 INJECTION, SOLUTION INTRAVENOUS at 17:18

## 2019-01-01 RX ADMIN — IPRATROPIUM BROMIDE AND ALBUTEROL SULFATE 3 ML: .5; 3 SOLUTION RESPIRATORY (INHALATION) at 14:27

## 2019-01-01 RX ADMIN — WARFARIN SODIUM 5 MG: 5 TABLET ORAL at 18:28

## 2019-01-01 RX ADMIN — LEVOTHYROXINE SODIUM 125 MCG: 25 TABLET ORAL at 05:32

## 2019-01-01 RX ADMIN — GENTAMICIN SULFATE 226 MG: 40 INJECTION, SOLUTION INTRAMUSCULAR; INTRAVENOUS at 15:53

## 2019-01-01 RX ADMIN — IPRATROPIUM BROMIDE AND ALBUTEROL SULFATE 3 ML: .5; 3 SOLUTION RESPIRATORY (INHALATION) at 10:31

## 2019-01-01 RX ADMIN — LISINOPRIL 5 MG: 5 TABLET ORAL at 04:47

## 2019-01-01 RX ADMIN — ETOMIDATE 20 MG: 2 INJECTION INTRAVENOUS at 16:31

## 2019-01-01 RX ADMIN — CYANOCOBALAMIN TAB 500 MCG 500 MCG: 500 TAB at 04:20

## 2019-01-01 RX ADMIN — WARFARIN SODIUM 2.5 MG: 2.5 TABLET ORAL at 17:12

## 2019-01-01 RX ADMIN — SENNOSIDES, DOCUSATE SODIUM 2 TABLET: 50; 8.6 TABLET, FILM COATED ORAL at 05:32

## 2019-01-01 RX ADMIN — CALCITRIOL 0.5 MCG: 0.25 CAPSULE ORAL at 05:32

## 2019-01-01 RX ADMIN — LEVETIRACETAM 500 MG: 500 TABLET ORAL at 16:41

## 2019-01-01 RX ADMIN — SEVELAMER CARBONATE 800 MG: 800 TABLET, FILM COATED ORAL at 12:06

## 2019-01-01 RX ADMIN — LEVOTHYROXINE SODIUM 125 MCG: 125 TABLET ORAL at 06:31

## 2019-01-01 RX ADMIN — LEVETIRACETAM 500 MG: 500 TABLET ORAL at 06:13

## 2019-01-01 RX ADMIN — INSULIN LISPRO 3 UNITS: 100 INJECTION, SOLUTION INTRAVENOUS; SUBCUTANEOUS at 11:15

## 2019-01-01 RX ADMIN — NIMODIPINE 60 MG: 30 CAPSULE ORAL at 18:37

## 2019-01-01 RX ADMIN — HYDRALAZINE HYDROCHLORIDE 10 MG: 20 INJECTION INTRAMUSCULAR; INTRAVENOUS at 15:48

## 2019-01-01 RX ADMIN — NIMODIPINE 60 MG: 30 CAPSULE ORAL at 06:14

## 2019-01-01 RX ADMIN — LEVOTHYROXINE SODIUM 125 MCG: 125 TABLET ORAL at 06:18

## 2019-01-01 RX ADMIN — MIDAZOLAM HYDROCHLORIDE 1 MG: 1 INJECTION, SOLUTION INTRAMUSCULAR; INTRAVENOUS at 13:30

## 2019-01-01 RX ADMIN — Medication 50000 UNITS: at 06:15

## 2019-01-01 RX ADMIN — NIMODIPINE 60 MG: 30 CAPSULE ORAL at 21:53

## 2019-01-01 RX ADMIN — ATORVASTATIN CALCIUM 40 MG: 40 TABLET, FILM COATED ORAL at 21:02

## 2019-01-01 RX ADMIN — Medication 50000 UNITS: at 06:04

## 2019-01-01 RX ADMIN — CYANOCOBALAMIN TAB 500 MCG 500 MCG: 500 TAB at 06:31

## 2019-01-01 RX ADMIN — AMPICILLIN SODIUM AND SULBACTAM SODIUM 3 G: 2; 1 INJECTION, POWDER, FOR SOLUTION INTRAMUSCULAR; INTRAVENOUS at 22:35

## 2019-01-01 RX ADMIN — FAMOTIDINE 20 MG: 20 TABLET ORAL at 06:32

## 2019-01-01 RX ADMIN — LEVOTHYROXINE SODIUM 125 MCG: 25 TABLET ORAL at 05:41

## 2019-01-01 RX ADMIN — IPRATROPIUM BROMIDE AND ALBUTEROL SULFATE 3 ML: .5; 3 SOLUTION RESPIRATORY (INHALATION) at 22:04

## 2019-01-01 RX ADMIN — SEVELAMER CARBONATE 800 MG: 800 TABLET, FILM COATED ORAL at 17:13

## 2019-01-01 RX ADMIN — IOHEXOL 120 ML: 350 INJECTION, SOLUTION INTRAVENOUS at 21:15

## 2019-01-01 RX ADMIN — ATORVASTATIN CALCIUM 40 MG: 40 TABLET, FILM COATED ORAL at 21:39

## 2019-01-01 RX ADMIN — IOHEXOL 100 ML: 350 INJECTION, SOLUTION INTRAVENOUS at 09:15

## 2019-01-01 RX ADMIN — SODIUM CHLORIDE 500 ML: 9 INJECTION, SOLUTION INTRAVENOUS at 20:33

## 2019-01-01 RX ADMIN — LEVOTHYROXINE SODIUM 125 MCG: 25 TABLET ORAL at 04:47

## 2019-01-01 RX ADMIN — LEVETIRACETAM 500 MG: 500 TABLET ORAL at 14:43

## 2019-01-01 RX ADMIN — WARFARIN SODIUM 6 MG: 6 TABLET ORAL at 18:19

## 2019-01-01 RX ADMIN — ATORVASTATIN CALCIUM 40 MG: 40 TABLET, FILM COATED ORAL at 21:53

## 2019-01-01 RX ADMIN — CALCITRIOL 0.25 MCG: 0.25 CAPSULE ORAL at 05:41

## 2019-01-01 RX ADMIN — FENTANYL CITRATE 100 MCG: 50 INJECTION INTRAMUSCULAR; INTRAVENOUS at 19:31

## 2019-01-01 RX ADMIN — SODIUM CHLORIDE 8 MG/HR: 9 INJECTION, SOLUTION INTRAVENOUS at 04:00

## 2019-01-01 RX ADMIN — Medication 500 UNITS: at 13:49

## 2019-01-01 RX ADMIN — LEVOTHYROXINE SODIUM 125 MCG: 25 TABLET ORAL at 05:42

## 2019-01-01 RX ADMIN — SENNOSIDES, DOCUSATE SODIUM 2 TABLET: 50; 8.6 TABLET, FILM COATED ORAL at 18:37

## 2019-01-01 RX ADMIN — CYANOCOBALAMIN TAB 500 MCG 500 MCG: 500 TAB at 04:48

## 2019-01-01 RX ADMIN — INSULIN LISPRO 2 UNITS: 100 INJECTION, SOLUTION INTRAVENOUS; SUBCUTANEOUS at 17:18

## 2019-01-01 RX ADMIN — HEPARIN SODIUM 1200 UNITS/HR: 5000 INJECTION, SOLUTION INTRAVENOUS at 18:46

## 2019-01-01 RX ADMIN — IOHEXOL 100 ML: 350 INJECTION, SOLUTION INTRAVENOUS at 17:19

## 2019-01-01 RX ADMIN — SODIUM CHLORIDE 5 MG/HR: 9 INJECTION, SOLUTION INTRAVENOUS at 00:58

## 2019-01-01 RX ADMIN — AMLODIPINE BESYLATE 10 MG: 5 TABLET ORAL at 05:41

## 2019-01-01 RX ADMIN — INSULIN GLARGINE 10 UNITS: 100 INJECTION, SOLUTION SUBCUTANEOUS at 18:38

## 2019-01-01 RX ADMIN — IPRATROPIUM BROMIDE AND ALBUTEROL SULFATE 3 ML: .5; 3 SOLUTION RESPIRATORY (INHALATION) at 06:57

## 2019-01-01 RX ADMIN — CYANOCOBALAMIN TAB 500 MCG 500 MCG: 500 TAB at 05:41

## 2019-01-01 RX ADMIN — PROTHROMBIN, COAGULATION FACTOR VII HUMAN, COAGULATION FACTOR IX HUMAN, COAGULATION FACTOR X HUMAN, PROTEIN C, PROTEIN S HUMAN, AND WATER 2088 UNITS: KIT at 00:41

## 2019-01-01 RX ADMIN — LIDOCAINE 1 PATCH: 50 PATCH TOPICAL at 06:18

## 2019-01-01 RX ADMIN — AMLODIPINE BESYLATE 10 MG: 5 TABLET ORAL at 05:32

## 2019-01-01 RX ADMIN — SODIUM CHLORIDE 8 MG/HR: 9 INJECTION, SOLUTION INTRAVENOUS at 13:36

## 2019-01-01 RX ADMIN — SEVELAMER CARBONATE 800 MG: 800 TABLET, FILM COATED ORAL at 19:35

## 2019-01-01 RX ADMIN — IPRATROPIUM BROMIDE AND ALBUTEROL SULFATE 3 ML: .5; 3 SOLUTION RESPIRATORY (INHALATION) at 22:15

## 2019-01-01 RX ADMIN — SENNOSIDES, DOCUSATE SODIUM 2 TABLET: 50; 8.6 TABLET, FILM COATED ORAL at 05:41

## 2019-01-01 RX ADMIN — FENTANYL CITRATE 100 MCG: 50 INJECTION INTRAMUSCULAR; INTRAVENOUS at 11:31

## 2019-01-01 RX ADMIN — NIMODIPINE 60 MG: 30 CAPSULE ORAL at 14:50

## 2019-01-01 RX ADMIN — SODIUM CHLORIDE 3 G: 900 INJECTION INTRAVENOUS at 18:37

## 2019-01-01 RX ADMIN — LISINOPRIL 5 MG: 5 TABLET ORAL at 05:33

## 2019-01-01 RX ADMIN — LISINOPRIL 5 MG: 5 TABLET ORAL at 05:47

## 2019-01-01 RX ADMIN — IPRATROPIUM BROMIDE AND ALBUTEROL SULFATE 3 ML: .5; 3 SOLUTION RESPIRATORY (INHALATION) at 06:28

## 2019-01-01 RX ADMIN — LEVETIRACETAM 500 MG: 500 TABLET ORAL at 06:31

## 2019-01-01 RX ADMIN — FENTANYL CITRATE 100 MCG: 50 INJECTION INTRAMUSCULAR; INTRAVENOUS at 16:41

## 2019-01-01 RX ADMIN — LEVOTHYROXINE SODIUM 125 MCG: 25 TABLET ORAL at 04:20

## 2019-01-01 RX ADMIN — IPRATROPIUM BROMIDE AND ALBUTEROL SULFATE 3 ML: .5; 3 SOLUTION RESPIRATORY (INHALATION) at 18:27

## 2019-01-01 RX ADMIN — CYANOCOBALAMIN TAB 500 MCG 500 MCG: 500 TAB at 05:42

## 2019-01-01 RX ADMIN — SODIUM CHLORIDE 8 MG/HR: 9 INJECTION, SOLUTION INTRAVENOUS at 08:13

## 2019-01-01 RX ADMIN — IPRATROPIUM BROMIDE AND ALBUTEROL SULFATE 3 ML: .5; 3 SOLUTION RESPIRATORY (INHALATION) at 02:07

## 2019-01-01 RX ADMIN — FENTANYL CITRATE 50 MCG: 50 INJECTION, SOLUTION INTRAMUSCULAR; INTRAVENOUS at 10:00

## 2019-01-01 RX ADMIN — INSULIN GLARGINE 10 UNITS: 100 INJECTION, SOLUTION SUBCUTANEOUS at 17:49

## 2019-01-01 RX ADMIN — SENNOSIDES, DOCUSATE SODIUM 2 TABLET: 50; 8.6 TABLET, FILM COATED ORAL at 06:13

## 2019-01-01 RX ADMIN — ATORVASTATIN CALCIUM 40 MG: 40 TABLET, FILM COATED ORAL at 20:00

## 2019-01-01 RX ADMIN — FAMOTIDINE 20 MG: 20 TABLET ORAL at 05:41

## 2019-01-01 RX ADMIN — INSULIN GLARGINE 10 UNITS: 100 INJECTION, SOLUTION SUBCUTANEOUS at 18:26

## 2019-01-01 RX ADMIN — IOHEXOL 40 ML: 350 INJECTION, SOLUTION INTRAVENOUS at 09:13

## 2019-01-01 RX ADMIN — INSULIN GLARGINE 10 UNITS: 100 INJECTION, SOLUTION SUBCUTANEOUS at 16:48

## 2019-01-01 RX ADMIN — IPRATROPIUM BROMIDE AND ALBUTEROL SULFATE 3 ML: .5; 3 SOLUTION RESPIRATORY (INHALATION) at 10:43

## 2019-01-01 RX ADMIN — AMLODIPINE BESYLATE 10 MG: 10 TABLET ORAL at 06:32

## 2019-01-01 RX ADMIN — FENTANYL CITRATE 25 MCG: 50 INJECTION, SOLUTION INTRAMUSCULAR; INTRAVENOUS at 13:30

## 2019-01-01 RX ADMIN — SODIUM CHLORIDE 1000 MG: 9 INJECTION, SOLUTION INTRAVENOUS at 01:32

## 2019-01-01 RX ADMIN — LIDOCAINE 1 PATCH: 50 PATCH TOPICAL at 06:30

## 2019-01-01 RX ADMIN — FENTANYL CITRATE 100 MCG: 50 INJECTION INTRAMUSCULAR; INTRAVENOUS at 11:37

## 2019-01-01 RX ADMIN — INSULIN HUMAN 4 UNITS: 100 INJECTION, SOLUTION PARENTERAL at 12:30

## 2019-01-01 RX ADMIN — HEPARIN SODIUM 1750 UNITS/HR: 5000 INJECTION, SOLUTION INTRAVENOUS at 05:51

## 2019-01-01 RX ADMIN — SODIUM CHLORIDE 3 G: 900 INJECTION INTRAVENOUS at 06:30

## 2019-01-01 RX ADMIN — SODIUM CHLORIDE 3 G: 900 INJECTION INTRAVENOUS at 16:41

## 2019-01-01 RX ADMIN — SODIUM CHLORIDE 4 UNITS/HR: 9 INJECTION, SOLUTION INTRAVENOUS at 12:30

## 2019-01-01 RX ADMIN — FAMOTIDINE 20 MG: 20 TABLET ORAL at 06:00

## 2019-01-01 RX ADMIN — HEPARIN SODIUM 2600 UNITS: 1000 INJECTION, SOLUTION INTRAVENOUS; SUBCUTANEOUS at 16:15

## 2019-01-01 RX ADMIN — NOREPINEPHRINE BITARTRATE 3 MCG/MIN: 1 INJECTION INTRAVENOUS at 20:59

## 2019-01-01 RX ADMIN — ATORVASTATIN CALCIUM 80 MG: 80 TABLET, FILM COATED ORAL at 19:21

## 2019-01-01 RX ADMIN — CALCITRIOL CAPSULES 0.25 MCG 0.25 MCG: 0.25 CAPSULE ORAL at 06:30

## 2019-01-01 RX ADMIN — ROCURONIUM BROMIDE 60 MG: 10 INJECTION, SOLUTION INTRAVENOUS at 16:31

## 2019-01-01 RX ADMIN — WARFARIN SODIUM 6 MG: 6 TABLET ORAL at 19:35

## 2019-01-01 RX ADMIN — FAMOTIDINE 20 MG: 10 INJECTION INTRAVENOUS at 06:16

## 2019-01-01 RX ADMIN — SODIUM CHLORIDE 3 G: 900 INJECTION INTRAVENOUS at 06:14

## 2019-01-01 RX ADMIN — LEVOTHYROXINE SODIUM 125 MCG: 25 TABLET ORAL at 04:01

## 2019-01-01 RX ADMIN — CALCITRIOL CAPSULES 0.25 MCG 0.5 MCG: 0.25 CAPSULE ORAL at 06:20

## 2019-01-01 ASSESSMENT — ENCOUNTER SYMPTOMS
ORTHOPNEA: 1
PND: 0
DIARRHEA: 0
TREMORS: 0
VOMITING: 0
SINUS PAIN: 0
PSYCHIATRIC NEGATIVE: 1
WEIGHT LOSS: 0
CHILLS: 0
MYALGIAS: 0
PALPITATIONS: 0
BACK PAIN: 1
ORTHOPNEA: 0
FEVER: 0
BLURRED VISION: 1
DOUBLE VISION: 0
WHEEZING: 0
ABDOMINAL PAIN: 0
NAUSEA: 0
POLYDIPSIA: 0
NECK PAIN: 0
ABDOMINAL PAIN: 0
CONSTIPATION: 0
SORE THROAT: 0
HEARTBURN: 0
POLYDIPSIA: 0
ORTHOPNEA: 0
CHILLS: 0
NAUSEA: 0
VOMITING: 0
HEADACHES: 0
CARDIOVASCULAR NEGATIVE: 1
SINUS PAIN: 0
SHORTNESS OF BREATH: 0
PSYCHIATRIC NEGATIVE: 1
PSYCHIATRIC NEGATIVE: 1
HEMOPTYSIS: 0
BACK PAIN: 1
HEARTBURN: 0
DIZZINESS: 0
ORTHOPNEA: 0
SENSORY CHANGE: 0
PSYCHIATRIC NEGATIVE: 1
TREMORS: 1
ABDOMINAL PAIN: 0
ABDOMINAL PAIN: 0
PALPITATIONS: 0
DIZZINESS: 0
PND: 0
COUGH: 0
HEADACHES: 0
FALLS: 1
SHORTNESS OF BREATH: 0
WEAKNESS: 1
SORE THROAT: 0
CHILLS: 0
NEUROLOGICAL NEGATIVE: 1
HEARTBURN: 0
WEAKNESS: 0
DOUBLE VISION: 0
ORTHOPNEA: 1
BLURRED VISION: 0
NEUROLOGICAL NEGATIVE: 1
SPUTUM PRODUCTION: 0
WEAKNESS: 0
TREMORS: 1
CONSTITUTIONAL NEGATIVE: 1
COUGH: 0
DIZZINESS: 0
RESPIRATORY NEGATIVE: 1
HEMOPTYSIS: 0
VOMITING: 0
HEMOPTYSIS: 0
DIAPHORESIS: 0
DIARRHEA: 0
HEMOPTYSIS: 0
PND: 0
ABDOMINAL PAIN: 0
CONSTIPATION: 0
COUGH: 0
NAUSEA: 0
DIZZINESS: 0
POLYDIPSIA: 0
WHEEZING: 0
CHILLS: 0
SINUS PAIN: 0
SHORTNESS OF BREATH: 0
HEARTBURN: 1
DIZZINESS: 0
CONSTITUTIONAL NEGATIVE: 1
GASTROINTESTINAL NEGATIVE: 1
BLURRED VISION: 1
EYE PAIN: 0
HEADACHES: 0
HEARTBURN: 0
SPEECH CHANGE: 1
CHILLS: 0
HEARTBURN: 1
DOUBLE VISION: 0
PHOTOPHOBIA: 0
BRUISES/BLEEDS EASILY: 0
PSYCHIATRIC NEGATIVE: 1
DIARRHEA: 0
SORE THROAT: 0
GASTROINTESTINAL NEGATIVE: 1
FEVER: 0
CONSTIPATION: 0
ORTHOPNEA: 1
NERVOUS/ANXIOUS: 1
GASTROINTESTINAL NEGATIVE: 1
DOUBLE VISION: 0
VOMITING: 0
HEARTBURN: 0
DIZZINESS: 0
SORE THROAT: 0
ORTHOPNEA: 0
DIARRHEA: 0
SEIZURES: 0
SORE THROAT: 0
DIAPHORESIS: 0
SPUTUM PRODUCTION: 0
POLYDIPSIA: 0
FEVER: 0
CARDIOVASCULAR NEGATIVE: 1
DOUBLE VISION: 0
DIAPHORESIS: 0
SHORTNESS OF BREATH: 0
EYE PAIN: 0
FOCAL WEAKNESS: 1
PALPITATIONS: 0
SORE THROAT: 0
MYALGIAS: 0
SHORTNESS OF BREATH: 0
CHILLS: 0
TREMORS: 1
HEMOPTYSIS: 0
HEADACHES: 0
DIZZINESS: 1
PHOTOPHOBIA: 0
ORTHOPNEA: 1
WHEEZING: 0
PND: 0
BLURRED VISION: 1
BLURRED VISION: 0
NERVOUS/ANXIOUS: 1
WEAKNESS: 0
WEAKNESS: 0
SINUS PAIN: 0
TREMORS: 1
ORTHOPNEA: 0
WEAKNESS: 0
HEADACHES: 0
BACK PAIN: 1
TREMORS: 0
PSYCHIATRIC NEGATIVE: 1
DIARRHEA: 0
PALPITATIONS: 0
PHOTOPHOBIA: 0
FEVER: 0
BACK PAIN: 1
EYES NEGATIVE: 1
HEMOPTYSIS: 0
NAUSEA: 0
SPUTUM PRODUCTION: 0
BLURRED VISION: 1
CONSTIPATION: 0
PALPITATIONS: 1
PHOTOPHOBIA: 0
FEVER: 0
VOMITING: 0
HEADACHES: 0
CONSTIPATION: 0
RESPIRATORY NEGATIVE: 1
FEVER: 0
BLURRED VISION: 1
DOUBLE VISION: 0
HEARTBURN: 0
NERVOUS/ANXIOUS: 1
SENSORY CHANGE: 1
BACK PAIN: 1
COUGH: 0
CARDIOVASCULAR NEGATIVE: 1
CHILLS: 0
SINUS PAIN: 0
LOSS OF CONSCIOUSNESS: 0
HEARTBURN: 0
NERVOUS/ANXIOUS: 1
FALLS: 1
ORTHOPNEA: 1
HEMOPTYSIS: 0
NECK PAIN: 0
SENSORY CHANGE: 0
COUGH: 0
NEUROLOGICAL NEGATIVE: 1
BLURRED VISION: 0
SHORTNESS OF BREATH: 0
DIAPHORESIS: 0
MYALGIAS: 0
COUGH: 1
PALPITATIONS: 0
SENSORY CHANGE: 0
ABDOMINAL PAIN: 1
NECK PAIN: 0
BLURRED VISION: 1
COUGH: 0
SINUS PAIN: 0
NAUSEA: 0
BLURRED VISION: 0
DIZZINESS: 0
WHEEZING: 0
PALPITATIONS: 0
BLURRED VISION: 1
SHORTNESS OF BREATH: 0
FALLS: 1
PHOTOPHOBIA: 0
TREMORS: 1
VOMITING: 0
BLOOD IN STOOL: 0
PHOTOPHOBIA: 0
BACK PAIN: 1
NAUSEA: 0
PND: 0
ORTHOPNEA: 0
DOUBLE VISION: 0
EYE PAIN: 0
EYES NEGATIVE: 1
MUSCULOSKELETAL NEGATIVE: 1
TREMORS: 1
PSYCHIATRIC NEGATIVE: 1
COUGH: 1
MUSCULOSKELETAL NEGATIVE: 1
NAUSEA: 0
ORTHOPNEA: 1
ABDOMINAL PAIN: 0
ABDOMINAL PAIN: 0
TREMORS: 0
NERVOUS/ANXIOUS: 1
PND: 0
MUSCULOSKELETAL NEGATIVE: 1
COUGH: 1
CONSTIPATION: 0
POLYDIPSIA: 0
SPUTUM PRODUCTION: 0
WHEEZING: 0
WEAKNESS: 0
WHEEZING: 0
PALPITATIONS: 0
SPEECH CHANGE: 1
VOMITING: 0
SENSORY CHANGE: 0
RESPIRATORY NEGATIVE: 1
SORE THROAT: 0
CONSTITUTIONAL NEGATIVE: 1
COUGH: 0
EYES NEGATIVE: 1
MYALGIAS: 0
SPUTUM PRODUCTION: 0
WHEEZING: 0
FEVER: 0
COUGH: 1
PHOTOPHOBIA: 0

## 2019-01-01 ASSESSMENT — LIFESTYLE VARIABLES
ALCOHOL_USE: NO
EVER_SMOKED: NEVER
EVER_SMOKED: NEVER

## 2019-01-01 ASSESSMENT — COGNITIVE AND FUNCTIONAL STATUS - GENERAL
DRESSING REGULAR LOWER BODY CLOTHING: A LOT
HELP NEEDED FOR BATHING: A LOT
CLIMB 3 TO 5 STEPS WITH RAILING: A LOT
SUGGESTED CMS G CODE MODIFIER DAILY ACTIVITY: CK
EATING MEALS: A LITTLE
DRESSING REGULAR UPPER BODY CLOTHING: A LITTLE
MOVING FROM LYING ON BACK TO SITTING ON SIDE OF FLAT BED: A LITTLE
TURNING FROM BACK TO SIDE WHILE IN FLAT BAD: A LITTLE
DRESSING REGULAR UPPER BODY CLOTHING: A LOT
DRESSING REGULAR LOWER BODY CLOTHING: A LOT
TOILETING: A LOT
MOVING TO AND FROM BED TO CHAIR: A LITTLE
TOILETING: A LOT
PERSONAL GROOMING: A LITTLE
WALKING IN HOSPITAL ROOM: A LITTLE
MOBILITY SCORE: 17
EATING MEALS: A LITTLE
STANDING UP FROM CHAIR USING ARMS: A LITTLE
DAILY ACTIVITIY SCORE: 15
STANDING UP FROM CHAIR USING ARMS: A LITTLE
HELP NEEDED FOR BATHING: A LOT
SUGGESTED CMS G CODE MODIFIER DAILY ACTIVITY: CK
MOBILITY SCORE: 18
PERSONAL GROOMING: A LITTLE
PERSONAL GROOMING: A LITTLE
STANDING UP FROM CHAIR USING ARMS: A LOT
SUGGESTED CMS G CODE MODIFIER MOBILITY: CL
WALKING IN HOSPITAL ROOM: A LITTLE
MOBILITY SCORE: 13
SUGGESTED CMS G CODE MODIFIER MOBILITY: CK
WALKING IN HOSPITAL ROOM: A LOT
DRESSING REGULAR UPPER BODY CLOTHING: A LITTLE
TURNING FROM BACK TO SIDE WHILE IN FLAT BAD: A LITTLE
SUGGESTED CMS G CODE MODIFIER MOBILITY: CK
CLIMB 3 TO 5 STEPS WITH RAILING: A LITTLE
MOVING TO AND FROM BED TO CHAIR: A LITTLE
DRESSING REGULAR LOWER BODY CLOTHING: A LOT
HELP NEEDED FOR BATHING: A LOT
DAILY ACTIVITIY SCORE: 15
MOVING TO AND FROM BED TO CHAIR: A LITTLE
DAILY ACTIVITIY SCORE: 14
SUGGESTED CMS G CODE MODIFIER DAILY ACTIVITY: CK
MOVING FROM LYING ON BACK TO SITTING ON SIDE OF FLAT BED: A LOT
TURNING FROM BACK TO SIDE WHILE IN FLAT BAD: A LOT
MOVING FROM LYING ON BACK TO SITTING ON SIDE OF FLAT BED: A LITTLE
TOILETING: A LOT
CLIMB 3 TO 5 STEPS WITH RAILING: A LOT
EATING MEALS: A LITTLE

## 2019-01-01 ASSESSMENT — GAIT ASSESSMENTS
DISTANCE (FEET): 90
GAIT LEVEL OF ASSIST: MINIMAL ASSIST
DEVIATION: SHUFFLED GAIT;OTHER (COMMENT)
GAIT LEVEL OF ASSIST: MINIMAL ASSIST
ASSISTIVE DEVICE: FRONT WHEEL WALKER
DISTANCE (FEET): 80
DEVIATION: DECREASED BASE OF SUPPORT;DECREASED HEEL STRIKE;DECREASED TOE OFF;OTHER (COMMENT)
ASSISTIVE DEVICE: FRONT WHEEL WALKER

## 2019-01-01 ASSESSMENT — COPD QUESTIONNAIRES
HAVE YOU SMOKED AT LEAST 100 CIGARETTES IN YOUR ENTIRE LIFE: NO/DON'T KNOW
COPD SCREENING SCORE: 2
DO YOU EVER COUGH UP ANY MUCUS OR PHLEGM?: NO/ONLY WITH OCCASIONAL COLDS OR INFECTIONS
DURING THE PAST 4 WEEKS HOW MUCH DID YOU FEEL SHORT OF BREATH: NONE/LITTLE OF THE TIME

## 2019-01-01 ASSESSMENT — PATIENT HEALTH QUESTIONNAIRE - PHQ9
SUM OF ALL RESPONSES TO PHQ9 QUESTIONS 1 AND 2: 0
1. LITTLE INTEREST OR PLEASURE IN DOING THINGS: NOT AT ALL
2. FEELING DOWN, DEPRESSED, IRRITABLE, OR HOPELESS: NOT AT ALL

## 2019-01-01 ASSESSMENT — ACTIVITIES OF DAILY LIVING (ADL): TOILETING: INDEPENDENT

## 2019-04-13 PROBLEM — D72.829 LEUKOCYTOSIS: Status: ACTIVE | Noted: 2019-01-01

## 2019-04-13 PROBLEM — E87.29 HIGH ANION GAP METABOLIC ACIDOSIS: Status: ACTIVE | Noted: 2019-01-01

## 2019-04-13 PROBLEM — R79.89 ELEVATED TROPONIN: Status: ACTIVE | Noted: 2019-01-01

## 2019-04-13 PROBLEM — I63.9 CVA (CEREBRAL VASCULAR ACCIDENT) (HCC): Status: ACTIVE | Noted: 2019-01-01

## 2019-04-13 PROBLEM — Z79.01 CHRONIC ANTICOAGULATION: Status: ACTIVE | Noted: 2019-01-01

## 2019-04-13 NOTE — ASSESSMENT & PLAN NOTE
For mechanical  [employer] aortic valve, placed in 1999  INR 4.80 on admission  Cards following, hold AC until cleared with NS  NS following, hold AC for minimum 1 week

## 2019-04-13 NOTE — ASSESSMENT & PLAN NOTE
Intubated due to inability to protect airway  Severe aphasia and dense R hemiparesis   SBP goal <140  Continue Atorvastatin    Hold AC  DC nimodipine due to hypotension  Physiatry: Accepted to [facility] acute rehab.   Rehab placement on hold now due to acute deterioration  NS following, no plan for surg intervention, okay for more liberal BP goal and to DC nimodipine  Palliative following plan for family meeting

## 2019-04-13 NOTE — SENIOR ADMIT NOTE
69M w/ ESRD on HD and history of AVR on coumadin presenting with expressive aphagia. Head CT negative for bleed. MRI contraindicated. Appreciate nephrology consultation. Gram stain and cultures of peritoneal fluid sent and medicated with ancef/gent    1. CVA, expressive broca type aphasia  2. ESRD on HD via CCPD  3. Supratherapeutic INR  4. Leukocytosis, mild biut immunosuppressed in setting of ESRD -> cultures and antibiotics per nephrology. Appreciate consultation and assistance.   5. Hx/o pacemaker for high degree AV block, not compatible with MRI  6. Elevated troponin in setting of CKD, ECG is paced -> CTM, no chest pain, presentation not consistent with ACS.     Plan  - neurology floor admission  - neuro checks  - bedside swallow  - hold coumadin, repeat INR  - atorvastatin 40mg daily  - ECG, carotid ultrasound and echo  - SLP consultation  - consult nephrology for HD  - follow up cultures results of peritoneal fluid    Code status: Full Code

## 2019-04-13 NOTE — THERAPY
"Speech Language Therapy Clinical Swallow Evaluation completed.  Functional Status: Clinical swallow evaluation completed this date. Pt alert, wife at bedside. Pt presents with nonfluent expressive aphasia, attempting to speak but with significant word finding difficulty, perseverations. Pt able to state his name and answer that he is an a hospital given verbal forced choice. Oral [facility] exam concerning for oral apraxia as pt demonstrated difficulty coordinating movements and executing a volitional cough/throat clear. Pt otherwise able to follow 1-step directives. Pt edentulous, wet vocal quality noted at baseline. Pt presented with ice chips, NTL via tsp/cup, thins via tsp/cup, purees. Oral phase unremarkable. Pharyngeal swallow trigger was timely though hyolaryngeal excursion palpated as slow, reduced, sluggish. Wet/gurgly vocal quality occurred following intake of thins via cup. Delayed reflexive wet throat clearing noted with intake of purees and nectar. When asked if he felt any residue in his throat, pt responded \"yes, a little.\" Pt had difficulty executing a strong throat clear or cough. Pt does not appear safe for a PO diet at this time as he is presenting with s/sx of aspiration with conservative PO trials of modified textures. Recommend NPO with Cortrak for alternative means of nutrition/hydration. Pt may benefit from a diagnostic swallow evaluation prior to advancing to PO alimentation, given neurologic changes and potential for silent aspiration.     Recommendations - Diet: NPO, Pre-Feeding Trials with SLP Only; up to 5 ice chips/hr ok with nsg supervision as long as pt is upright, alert. STOP with any overt s/sx of aspiration.                          Strategies: Head of Bed at 90 Degrees                          Medication Administration: Medication Administration : Via Gastric Tube  Plan of Care: Will benefit from Speech Therapy 5 times per week  Post-Acute Therapy: Recommend inpatient transitional care " "services for continued speech therapy services.        See \"Rehab Therapy-Acute\" Patient Summary Report for complete documentation.   "

## 2019-04-13 NOTE — ASSESSMENT & PLAN NOTE
GLF day prior to admission and was found down the morning of admission.   Fall/Seizure precautions  PT/OT: recommend acute rehab prior to d/c home  Rehab placement on hold for now given deterioration of condition

## 2019-04-13 NOTE — ED NOTES
Marcin from Lab called with critical result of trop 0.82 at 0842. Critical lab result read back to Marcin.   Dr. Brandt notified of critical lab result at 0842.  Critical lab result read back by Dr. Brandt.

## 2019-04-13 NOTE — PROGRESS NOTES
Connected aseptically to CAPD 1.5% solution and instilled approx 200 ml, specimen obtained for cell count and gram staining sent. Report given to NANY [place]. See flow sheet for details

## 2019-04-13 NOTE — PROGRESS NOTES
2 RN assessment completed with Karla AYON.    Skin intact, dryness to BL feet, small abrasions on mid back and R elbow.    Pillows used to reposition, barrier cream to buttocks.

## 2019-04-13 NOTE — ED NOTES
Med Rec complete per Ptls caretaker at bedside and [facility]  Allergies Reviewed  No ABX in the last 30 days

## 2019-04-13 NOTE — PROGRESS NOTES
1520 Bedside report received from NOC RN (Nela) from the receiving RN. Patient's in bed. Spouse visiting. Assumed care. Patient's in bed, noted expressive aphasia, answers yes/no questions. No c/o's pain at this time. No c/o's numbness or tingling at this time. Plan of care reviewed with the patient & spouse. Questions answered. Verbalized understanding.    1545 David, Dialysis RN collected specimen from the PT catheter & was sent to the lab as per order.    1555 Admission profile completed.     1610 Elke,SLP working with the patient.     1630 Elke,SLP notiifed Primary nurse that patient failed swallow Eval & would benefit for Cortrak placement.    1740 Aba Dialysis RN prepping patient for Peritoneal Dialysis.    1748 Placed a call to Dr Alicia Black, awaiting for response.    1750 Received a call from Dr Alicia Black, notified the said MD that patient failed swallow Eval & per SLP she recommends Cortrak placement.    1755 Received an order for Cortrak placement for the patient from Dr Alicia Black.

## 2019-04-13 NOTE — CONSULTS
DATE OF SERVICE:  04/13/2019    REASON FOR CONSULTATION:  End-stage renal failure.    HISTORY OF PRESENT ILLNESS:  Patient is a 69-year-old man with end-stage renal   failure, on chronic cycling peritoneal dialysis.  He was in his usual state   of health until this morning, his cough wife found him unresponsive on the   floor, lying in a pool of feces and urine with his peritoneal dialysis   catheter uncapped..  He was unresponsive, brought to the emergency room, and was   found to have an expressive aphasia.  Patient has an   aortic valve replacement, he is on Coumadin.  He is over anticoagulated.  I am   asked to see with respect to his dialysis needs.    PAST MEDICAL HISTORY:  1.  End-stage renal failure, on chronic maintenance hemodialysis.  2.  History of aortic valve replacement.  3.  DM  4.  HTN  5.  Pacer      SOCIAL HISTORY:  He lives with his wife.  He neither drinks nor smokes.    PHYSICAL EXAMINATION:  GENERAL:  He is awake and alert.  He has an expressive aphasia.  VITAL SIGNS:  Blood pressure is 121/59, pulse is 83 and regular, temperature   is 96.3.  HEAD, EYES, EARS, NOSE AND THROAT:  Atraumatic and normocephalic.  Pupils were   equal, round, reactive to light and accommodation.  Extraocular movements are   full.  NECK:  Supple.  CHEST:  Clear.  HEART:  Reveals normal S1 and S2, without murmurs, rubs or gallops.  ABDOMEN:  Soft and nontender without organomegaly or mass.  Dialysis catheter   exits his right side of his abdomen.  The tunnel is without tenderness or   induration.  His abdomen is soft without tenderness.    EXTREMITIES:  Have no edema.      LABORATORY DATA:  Sodium 142, potassium 4.9, chloride 104, CO2 of 24, BUN 63,   creatinine 8.44, calcium 10.4, albumin 3.7.  Protime is 44.8.  INR is 4.8, PTT   is 53.2.  White count 11.6, hemoglobin is 13.0, platelet count 189,000,   lactic acid is 1.1, troponin 0.82.  CT scan shows no acute intraabdominal   processes.  He has an absent left kidney,  has bibasilar atelectasis and   scarring, no pleural effusions.  CT scan of his head shows no large vessel   occlusion, high grade stenosis or aneurysm of the [place], has a   suprasellar aneurysm coiling.  CT of his neck is unremarkable.    IMPRESSION:  1.  End-stage renal failure, on CCPD.  2.  Stroke vs. TIA  3.  Aortic valve replacement  4.  DM  5   HTN  6.  Pacer    PLAN:  I asked the dialysis nurses to flush his peritoneal cantheter and send the fluid for   cell count, Gram stain and culture.  I gave the patient a dose of Ancef and   Gentamicin emperically.  He will have nightly peritoneal dialysis.    Thank you very much for this consult.       ____________________________________     MD CHARLY HOFF / SALVATORE    DD:  04/13/2019 13:00:06  DT:  04/13/2019 13:32:35    D#:  [id number]  Job#:  [id number]

## 2019-04-13 NOTE — H&P
Internal Medicine Admitting History and Physical    Note Author: Corby Ravi M.D.       Name Efrain Martines 1950   Age/Sex 69 y.o. male   MRN [id number]   Code Status FULL     After 5PM or if no immediate response to page, please call for cross-coverage  Attending/Team: Salvatore/Nicole See Patient List for primary contact information  Call [phone] to page    1st Call - Day Intern (R1):   Breonna 2nd Call - Day Sr. Resident (R2/R3):   Gama Ortega       Chief Complaint:   Altered level of consciousness    HPI:  Mr. Martines is a right-handed 69-year-old male last known normal about 6:30 PM on 19, with past medical history significant for ESRD on PD, insulin-dependent diabetes mellitus with peripheral neuropathy, hypertension, hyperlipidemia, PPM secondary to third-degree AV block (), AVR (on warfarin), cerebral aneurysm (s/p coiling ), who was BIBA after being found on the ground with altered level of consciousness this morning.  History is largely from his wife who is at bedside. His wife reports that she found patient about 1 m away from the bed this morning upon returning from work.  He was nonverbal but clearly motioned to be helped.  He was incontinent of stool and urine.  It appears he had gotten up to walk to weigh himself after a session of PD.  His wife reports that he had a mechanical fall the day prior to this event.  She is unsure if he, hit his head, or lost consciousness.    In the ED, initial vitals were stable, temp was 96.3. He was still non-verbal. Neuroimaging was negative for acute hemorrhage, aneurysms, stenosis or ischemic changes. He was supra-therapeutic on coumadin INR 4.8, has a history of aneurysmal coiling, he was not a candidate for tPA. He has a PPM and aneurysmal coiling, MRI was unable to be obtained. He has elevated troponin in the setting of ESRD and paced VR. He was seen by nephrology.        Review of Systems   Constitutional:  Negative for chills, fever, malaise/fatigue and weight loss.   HENT: Negative for congestion, hearing loss, sore throat and tinnitus.    Eyes: Positive for blurred vision. Negative for double vision, photophobia and pain.   Respiratory: Negative for cough, hemoptysis, shortness of breath and wheezing.    Cardiovascular: Negative for chest pain, palpitations, orthopnea and leg swelling.   Gastrointestinal: Negative for abdominal pain, heartburn, nausea and vomiting.   Genitourinary: Negative.    Musculoskeletal: Positive for falls. Negative for joint pain and myalgias.   Skin: Negative for itching and rash.   Neurological: Negative for dizziness, seizures, loss of consciousness and headaches.   Psychiatric/Behavioral: Negative.            Past Medical History (Chronic medical problem, known complications and current treatment)    Past Medical History:   Diagnosis Date   • Cerebral aneurysm 1990's    w/clot into L kidney    • Cervical radiculopathy    • Chronic hepatitis C (CMS-HCC) (HCC)    • Chronic kidney disease     stage 4   • CVA (cerebral vascular accident) (CMS-HCC) (HCC) 1993    residual right sided weakness   • DM (diabetes mellitus) (CMS-HCC) (HCC)     type 2   • HTN (hypertension)    • Hyperlipidemia    • Hypothyroidism    • Obstructive sleep apnea     cpap nightly    • Pacemaker    • Peripheral neuropathy (CMS-HCC) (HCC)          Past Surgical History:  Past Surgical History:   Procedure Laterality Date   • AORTIC VALVE REPLACEMENT     • MITRAL VALVE REPLACEMENT     • NEPHRECTOMY LAPAROSCOPIC Left        Current Outpatient Medications:  Home Medications     Reviewed by Kemi Mcconnell (Pharmacy Tech) on 04/13/19 at 1122  Med List Status: Complete   Medication Last Dose Status   artificial tears 1.4 % Solution 4/12/2019 Active   atorvastatin (LIPITOR) 40 MG Tab 4/12/2019 Active   calcitRIOL (ROCALTROL) 0.25 MCG Cap 4/12/2019 Active   cyanocobalamin (VITAMIN B-12) 500 MCG Tab 4/12/2019 Active   insulin  "glargine (LANTUS) 100 UNIT/ML Solution 4/11/2019 Active   levothyroxine (SYNTHROID) 125 MCG Tab 4/12/2019 Active   lidocaine (LIDODERM) 5 % Patch 4/12/2019 Active   lisinopril (PRINIVIL) 2.5 MG Tab 4/12/2019 Active   metoprolol SR (TOPROL XL) 50 MG TABLET SR 24 HR 4/12/2019 Active   pregabalin (LYRICA) 75 MG Cap 4/12/2019 Active   sevelamer (RENAGEL) 800 MG Tab 4/12/2019 Active   traZODone (DESYREL) 50 MG Tab 4/12/2019 Active   vitamin D, Ergocalciferol, (DRISDOL) 88901 units Cap capsule 4/8/2019 Active   warfarin (COUMADIN) 5 MG Tab 4/5/2019 Active                Medication Allergy/Sensitivities:  No Known Allergies      Family History (mandatory)   Family History   Problem Relation Age of Onset   • Family history unknown: Yes       Social History (mandatory)   Social History     Social History   • Marital status:      Spouse name: N/A   • Number of children: N/A   • Years of education: N/A     Occupational History   • Not on file.     Social History Main Topics   • Smoking status: Former Smoker     Packs/day: 1.00     Years: 20.00     Types: Cigarettes     Quit date: 1993   • Smokeless tobacco: Never Used   • Alcohol use No   • Drug use: No   • Sexual activity: No     Other Topics Concern   • Not on file     Social History Narrative    Lives in home in [place], [place].      Living situation: Lives at home with wife.  Ambulatory with walker/cane.   PCP : No primary care provider on file.  [facility]    Physical Exam     Vitals:    04/13/19 1100 04/13/19 1130 04/13/19 1145 04/13/19 1200   Pulse:  86 82 82   Resp: 20   18   Temp:       TempSrc:       SpO2: 100% 100% 100% 100%   Weight:       Height:         Body mass index is 27.37 kg/m².  Pulse 82   Temp (!) 35.7 °C (96.3 °F) (Temporal)   Resp 18   Ht 1.803 m (5' 11\")   Wt 89 kg (196 lb 3.4 oz)   SpO2 100%   BMI 27.37 kg/m²   O2 therapy: Pulse Oximetry: 100 %, O2 (LPM): 2, O2 Delivery: Silicone Nasal Cannula    Physical Exam   Constitutional: He is " well-developed, well-nourished, and in no distress.   HENT:   Head: Normocephalic and atraumatic.   Right Ear: External ear normal.   Left Ear: External ear normal.   Mouth/Throat: No oropharyngeal exudate.   Edentulous  Dry mucous membranes   Eyes: Pupils are equal, round, and reactive to light. Conjunctivae are normal. Right eye exhibits no discharge. Left eye exhibits no discharge. No scleral icterus.   Neck: Normal range of motion. Neck supple. No JVD present. No tracheal deviation present. No thyromegaly present.   Cardiovascular: Normal rate, regular rhythm and intact distal pulses.  Exam reveals no friction rub.    Murmur heard.  Pulmonary/Chest: Effort normal and breath sounds normal. No respiratory distress. He has no wheezes. He has no rales.   Abdominal: Soft. Bowel sounds are normal. He exhibits no distension. There is no tenderness. There is no rebound and no guarding.   PD port site:  C/D/I, no signs of infection   Neurological: He is alert. He has normal reflexes. A cranial nerve deficit is present.   Expressive Aphasia.   Right lower face droop per wife.            Skin: Skin is warm and dry.   Psychiatric: Mood, memory, affect and judgment normal.   Nursing note and vitals reviewed.        Data Review       Old Records Request:   Deferred  Current Records review/summary: Completed    Lab Data Review:  Recent Results (from the past 24 hour(s))   COD (ADULT)    Collection Time: 04/13/19  8:03 AM   Result Value Ref Range    ABO Grouping Only O     Rh Grouping Only POS     Antibody Screen-Cod POS    ANTIBODY IDENTIFICATION    Collection Time: 04/13/19  8:03 AM   Result Value Ref Range    Antibody Id POS, anti-c (little)     Antibody Id POS, AntibodySpecificity:Undetermined    Lactic acid (lactate)    Collection Time: 04/13/19  8:05 AM   Result Value Ref Range    Lactic Acid 1.1 0.5 - 2.0 mmol/L   CBC WITH DIFFERENTIAL    Collection Time: 04/13/19  8:05 AM   Result Value Ref Range    WBC 11.6 (H) 4.8 -  10.8 K/uL    RBC 4.33 (L) 4.70 - 6.10 M/uL    Hemoglobin 13.0 (L) 14.0 - 18.0 g/dL    Hematocrit 41.2 (L) 42.0 - 52.0 %    MCV 95.2 81.4 - 97.8 fL    MCH 30.0 27.0 - 33.0 pg    MCHC 31.6 (L) 33.7 - 35.3 g/dL    RDW 47.1 35.9 - 50.0 fL    Platelet Count 189 164 - 446 K/uL    MPV 11.3 9.0 - 12.9 fL    Neutrophils-Polys 77.90 (H) 44.00 - 72.00 %    Lymphocytes 11.40 (L) 22.00 - 41.00 %    Monocytes 7.70 0.00 - 13.40 %    Eosinophils 2.20 0.00 - 6.90 %    Basophils 0.30 0.00 - 1.80 %    Immature Granulocytes 0.50 0.00 - 0.90 %    Nucleated RBC 0.00 /100 WBC    Neutrophils (Absolute) 9.05 (H) 1.82 - 7.42 K/uL    Lymphs (Absolute) 1.32 1.00 - 4.80 K/uL    Monos (Absolute) 0.89 (H) 0.00 - 0.85 K/uL    Eos (Absolute) 0.25 0.00 - 0.51 K/uL    Baso (Absolute) 0.04 0.00 - 0.12 K/uL    Immature Granulocytes (abs) 0.06 0.00 - 0.11 K/uL    NRBC (Absolute) 0.00 K/uL   COMP METABOLIC PANEL    Collection Time: 04/13/19  8:05 AM   Result Value Ref Range    Sodium 142 135 - 145 mmol/L    Potassium 4.9 3.6 - 5.5 mmol/L    Chloride 104 96 - 112 mmol/L    Co2 24 20 - 33 mmol/L    Anion Gap 14.0 (H) 0.0 - 11.9    Glucose 137 (H) 65 - 99 mg/dL    Bun 63 (H) 8 - 22 mg/dL    Creatinine 8.44 (HH) 0.50 - 1.40 mg/dL    Calcium 10.4 8.5 - 10.5 mg/dL    AST(SGOT) 11 (L) 12 - 45 U/L    ALT(SGPT) 12 2 - 50 U/L    Alkaline Phosphatase 79 30 - 99 U/L    Total Bilirubin 0.5 0.1 - 1.5 mg/dL    Albumin 3.7 3.2 - 4.9 g/dL    Total Protein 7.4 6.0 - 8.2 g/dL    Globulin 3.7 (H) 1.9 - 3.5 g/dL    A-G Ratio 1.0 g/dL   PROTHROMBIN TIME    Collection Time: 04/13/19  8:05 AM   Result Value Ref Range    PT 44.8 (H) 12.0 - 14.6 sec    INR 4.80 (H) 0.87 - 1.13   APTT    Collection Time: 04/13/19  8:05 AM   Result Value Ref Range    APTT 53.2 (H) 24.7 - 36.0 sec   TROPONIN    Collection Time: 04/13/19  8:05 AM   Result Value Ref Range    Troponin I 0.82 (H) 0.00 - 0.04 ng/mL   ESTIMATED GFR    Collection Time: 04/13/19  8:05 AM   Result Value Ref Range    GFR If   8 (A) >60 mL/min/1.73 m 2    GFR If Non African American 6 (A) >60 mL/min/1.73 m 2   ABO AND RH CONFIRMATION    Collection Time: 19  8:34 AM   Result Value Ref Range    ABO Confirm O     Second Rh Group POS    EKG (NOW)    Collection Time: 19  8:47 AM   Result Value Ref Range    Report       [facility] Emergency Dept.    Test Date:  2019  Pt Name:    GHAZAL BENEDICT            Department: ER  MRN:        [id number]                      Room:       [facility]  Gender:     Male                         Technician: [id number]  :        1950                   Requested By:CHRISTIANO ULLOA  Order #:    [id number]                    Reading MD: CHRISTIANO ULLOA MD    Measurements  Intervals                                Axis  Rate:       93                           P:          70  MD:         172                          QRS:        -60  QRSD:       174                          T:          131  QT:         448  QTc:        558    Interpretive Statements  ATRIAL-SENSED VENTRICULAR-PACED RHYTHM  NO FURTHER ANALYSIS ATTEMPTED DUE TO PACED RHYTHM  BASELINE WANDER IN LEAD(S) I,III,aVL  Compared to ECG 2018 08:32:30  Atrial premature complex(es) no longer present  Ventricular premature complex(es) no longer present    Electronically Signed On 20 8:51:16 PDT by CHRISTIANO ULLOA MD         Imaging/Procedures Review:    Independant Imaging Review: Completed  CT-CTA NECK WITH & W/O-POST PROCESSING   Final Result      Unremarkable CT angiogram of the neck. No high-grade stenosis, large vessel occlusion, aneurysm or dissection.      CT-CTA HEAD WITH & W/O-POST PROCESS   Final Result      1.  No large vessel occlusion, high-grade stenosis or aneurysm of the [place] of [place].   2.  Suprasellar aneurysm coiling.      CT-RENAL COLIC EVALUATION(A/P W/O)   Final Result         1. No acute abnormality in the abdomen or pelvis.   2. Absent left kidney. Unremarkable  right kidney. No hydronephrosis.   3. Bibasilar atelectasis/scarring. No pleural effusions.      CT-CEREBRAL PERFUSION ANALYSIS   Final Result      1.  Cerebral blood flow less than 30% likely representing completed infarct = 0 mL.      2.  T Max more than 6 seconds likely representing combination of completed infarct and ischemia = 0 mL.      3.  Mismatched volume likely representing ischemic brain/penumbra = 0 mL.      4.  Please note that the cerebral perfusion was performed on the limited brain tissue around the basal ganglia region. Infarct/ischemia outside the CT perfusion sections can be missed in this study.      CT-HEAD W/O   Final Result      1.  No CT evidence of acute infarct, hemorrhage or mass.   2.  Small to moderate global parenchymal atrophy and chronic small vessel ischemic changes.      DX-CHEST-PORTABLE (1 VIEW)   Final Result         1. Nonspecific bilateral interstitial and alveolar opacities, likely mild edema. No significant pleural effusions.   2. Mild cardiomegaly.           EKG:   EKG Independent Review: Completed  V-paced VR: 93, no further analysis done.     Records reviewed and summarized in current documentation :  Yes  [employer] teaching service handout given to patient:  No         Assessment/Plan     * CVA (cerebral vascular accident) (HCC)- (present on admission)   Assessment & Plan    Last seen normal 6:30 PM on 4/12. Found down this morning.   Mild right lower face droop (per wife), motor aphasia. No other focal neurologic deficit. NIHSS 9.   Neuroimaging: nil for acute.     Plan:  Admit to neurology with cardiac monitoring  Permissive hypertension  Q 4HR   Atorvastatin    PT/OT  NPO, SLP pending   Consider physiatry consult      ESRD (end stage renal disease) (HCC)- (present on admission)   Assessment & Plan    Get PD QHS.   EDP notified on call nephrologist.     Plan:  Nephrology following, appreciate their input: peritoenal fluid clx pending, got one dose of Ancef + gentamycin.      Chronic anticoagulation- (present on admission)   Assessment & Plan    With coumadin for AVR. INR 4.80.     Neuroimaging neg for hemorrhage.       Plan:  Hold AC  SCDs for DVt ppx  No ASA for now       Diabetes (HCC)- (present on admission)   Assessment & Plan    A1C pending.     Plan:  NPO pending SLP eval --> diabetic diet  SSI with hypoglycemic protocol     Hypertension- (present on admission)   Assessment & Plan    Plan:    Holding home antihypertensives for permissive hypertension.      Elevated troponin- (present on admission)   Assessment & Plan    He has elevated troponin in the setting of ESRD and paced VR. Not concerning for ACS.     Plan:  He was seen by nephrology.           High anion gap metabolic acidosis- (present on admission)   Assessment & Plan    GAP 14 due to uremia.     Plan:  Nephology following will get PD     Leukocytosis- (present on admission)   Assessment & Plan    Stress induced.        History of mechanical fall without injury- (present on admission)   Assessment & Plan    Fell on the day prior to admission and was found down the morning of admission.       Plan:  Fall/Seizure precautions  PT/OT pending         Hypothyroidism- (present on admission)   Assessment & Plan      Plan:  Continue home synthroid  TSH/R T4 pending     Hyperlipidemia- (present on admission)   Assessment & Plan    Was on home atorvastatin 40.     Plan:  Atorvastatin 80          Anticipated Hospital stay:  >2 midnights        Quality Measures  Quality-Core Measures   Reviewed items::  Labs reviewed, Medications reviewed and Radiology images reviewed  Leach catheter::  No Leach  DVT prophylaxis - mechanical:  SCDs    PCP: No primary care provider on file.

## 2019-04-13 NOTE — PROGRESS NOTES
Surgery patient?: no  Date of surgery: n/a  Ambulated 50 ft on day of surgery? (N/A if today is not date of surgery): n/a  Number of times ambulated 50 feet or greater today: 0  Patient has been up to chair, edge of bed or HOB 90 degrees for all meals?: yes  Goal met? (goal is ambulating at least 50 feet 2 times on day shift, one time on night shift): no, x2 assist  If patient did not meet mobility goal, why?: fell today PTA

## 2019-04-13 NOTE — ED NOTES
Neuro check completed, no change from previous assessment, pt resting comfortably, pt and family aware of POC.

## 2019-04-13 NOTE — DISCHARGE PLANNING
Care Transition Team Assessment    Wife states that patient does have an advance directive.  Spouse, Ameena  ([phone]) is his support and will provide ride home upon discharge.      Patient uses O2 at home at 2L as well as a CPAP at night.  [facility].  Patient also uses a cane on occasion at home and has a scooter he uses as well.    [facility]  No other needs anticipated at this time.        Information Source  Orientation : Oriented x 4  Information Given By: Spouse  Informant's Name:  (Ameena Martines)  Who is responsible for making decisions for patient? : Patient    Elopement Risk  Legal Hold: No  Ambulatory or Self Mobile in Wheelchair: No-Not an Elopement Risk    Interdisciplinary Discharge Planning  Does Admitting Nurse Feel This Could be a Complex Discharge?: No  [facility] Care Physician:  [facility]  Lives with - Patient's Self Care Capacity: Spouse  Support Systems: Spouse / Significant Other  Housing / Facility: [address]  Do You Take your Prescribed Medications Regularly: Yes  Mobility Issues: Yes  Patient Expects to be Discharged to:: Home  Assistance Needed: Unknown at this Time  Durable Medical Equipment: Other - Specify, Home Oxygen (CPAP)  DME Provider / Phone:  ([facility]  [phone])    Discharge Preparedness  What is your plan after discharge?: Home with help  What are your discharge supports?: Spouse  Prior Functional Level: Ambulatory, Uses Cane  Difficulity with ADLs: None  Difficulity with IADLs: None    Functional Assesment  Prior Functional Level: Ambulatory, Uses Cane    Finances  Financial Barriers to Discharge: No  Prescription Coverage: Yes    Advance Directive  Advance Directive?: Living Will, DPOA for Health Care    Discharge Risks or Barriers  Discharge risks or barriers?: No    Anticipated Discharge  Information  Anticipated discharge disposition: Home  Discharge Address:  ([address] [place] [address])  Discharge Contact Phone Number:  ([phone])

## 2019-04-13 NOTE — ED TRIAGE NOTES
Pt BIB EMS from home. Pt's care taker found him supine on the ground this morning. Last known well last night before bed. Pt is normally A&Ox4 and conversational, found to only be answering with yes/no, but responding appropriately and following commands. FSBG 127. Pt is on blood thinners and is paced. Pt gets peritoneal dialysis nightly. [facility]

## 2019-04-13 NOTE — ED PROVIDER NOTES
ED Provider Note    Scribed for Gene Brandt M.D. by Courtney Villegas. 4/13/2019  8:04 AM    Primary care provider: None noted  Means of arrival: Ambulance  History obtained from: Patient and nursing staff  History limited by: Limited by altered mental status.    CHIEF COMPLAINT  Chief Complaint   Patient presents with   • ALOC       HPI  Efrain Martines is a 69 y.o. male with a history of diabetes who presents to the Emergency Department for altered mental status onset this morning. The patient was found on the floor earlier this morning by his caretaker and was found to only be able to answer yes and no questions. Patient denies any falls, abdominal pain, chest pain, cough, vomiting. Per nursing staff, the patient's baseline is A/O x4 and get peritoneal dialysis nightly. At this time, the patient reports difficulty speaking which is new for him. Further information is limited due to the patient's altered mental status.     REVIEW OF SYSTEMS  Pertinent positives include altered mental status, difficulty speaking. Pertinent negatives include no abdominal pain, chest pain, cough, vomiting.      PAST MEDICAL HISTORY   has a past medical history of Cerebral aneurysm (1990's); Cervical radiculopathy; Chronic hepatitis C (HCC); Chronic kidney disease; CVA (cerebral vascular accident) (HCC) (1993); DM (diabetes mellitus) (HCC); HTN (hypertension); Hyperlipidemia; Hypothyroidism; Obstructive sleep apnea; Pacemaker; and Peripheral neuropathy.    SURGICAL HISTORY   has a past surgical history that includes nephrectomy laparoscopic (Left); aortic valve replacement; and mitral valve replacement.    SOCIAL HISTORY  Social History   Substance Use Topics   • Smoking status: Former Smoker     Packs/day: 1.00     Years: 20.00     Types: Cigarettes     Quit date: 1993   • Smokeless tobacco: Never Used   • Alcohol use No      History   Drug Use No       FAMILY HISTORY  Family History   Problem Relation Age of Onset   • No  "Known Problems Mother    • No Known Problems Father        CURRENT MEDICATIONS  Home Medications     Reviewed by Kemi Mcconnell (Pharmacy Tech) on 04/13/19 at 1122  Med List Status: Complete   Medication Last Dose Status   artificial tears 1.4 % Solution 4/12/2019 Active   atorvastatin (LIPITOR) 40 MG Tab 4/12/2019 Active   calcitRIOL (ROCALTROL) 0.25 MCG Cap 4/12/2019 Active   cyanocobalamin (VITAMIN B-12) 500 MCG Tab 4/12/2019 Active   insulin glargine (LANTUS) 100 UNIT/ML Solution 4/11/2019 Active   levothyroxine (SYNTHROID) 125 MCG Tab 4/12/2019 Active   lidocaine (LIDODERM) 5 % Patch 4/12/2019 Active   lisinopril (PRINIVIL) 2.5 MG Tab 4/12/2019 Active   metoprolol SR (TOPROL XL) 50 MG TABLET SR 24 HR 4/12/2019 Active   pregabalin (LYRICA) 75 MG Cap 4/12/2019 Active   sevelamer (RENAGEL) 800 MG Tab 4/12/2019 Active   traZODone (DESYREL) 50 MG Tab 4/12/2019 Active   vitamin D, Ergocalciferol, (DRISDOL) 55827 units Cap capsule 4/8/2019 Active   warfarin (COUMADIN) 5 MG Tab 4/5/2019 Active                ALLERGIES  No Known Allergies    PHYSICAL EXAM  VITAL SIGNS: Pulse 98   Temp (!) 35.7 °C (96.3 °F) (Temporal)   Resp 20   Ht 1.803 m (5' 11\")   Wt 89 kg (196 lb 3.4 oz)   SpO2 98%   BMI 27.37 kg/m²     Constitutional: Well developed, Well nourished, Mild  distress, Non-toxic appearance.   HENT: Normocephalic, Atraumatic, Bilateral external ears normal, Slightly dry mucous membranes, No oral exudates.   Eyes: PERRLA, EOMI, Conjunctiva normal, No discharge.   Neck: No tenderness, Supple, No stridor.   Lymphatic: No lymphadenopathy noted.   Cardiovascular: Normal heart rate, Normal rhythm. Click with heart sounds.   Thorax & Lungs: Clear to auscultation bilaterally, No respiratory distress, No wheezing, No crackles.   Abdomen: Soft, Mild tenderness to lower abdomen, No masses, No pulsatile masses. Dialysis catheter on right side of abdomen  Skin: Warm, Dry, No erythema, No rash.   Extremities:, No edema No " "cyanosis.   Musculoskeletal: No tenderness to palpation or major deformities noted.  Intact distal pulses  Neurologic: Awake, alert. Moves all extremities spontaneously. Severely dysarthric. Can answer yes and no questions. Good muscle strength throughout.  Psychiatric: Affect normal, Judgment normal, Mood normal.       LABS  Labs Reviewed   CBC WITH DIFFERENTIAL - Abnormal; Notable for the following:        Result Value    WBC 11.6 (*)     RBC 4.33 (*)     Hemoglobin 13.0 (*)     Hematocrit 41.2 (*)     MCHC 31.6 (*)     Neutrophils-Polys 77.90 (*)     Lymphocytes 11.40 (*)     Neutrophils (Absolute) 9.05 (*)     Monos (Absolute) 0.89 (*)     All other components within normal limits   COMP METABOLIC PANEL - Abnormal; Notable for the following:     Anion Gap 14.0 (*)     Glucose 137 (*)     Bun 63 (*)     Creatinine 8.44 (*)     AST(SGOT) 11 (*)     Globulin 3.7 (*)     All other components within normal limits   PROTHROMBIN TIME - Abnormal; Notable for the following:     PT 44.8 (*)     INR 4.80 (*)     All other components within normal limits    Narrative:     Indicate which anticoagulants the patient is on:->UNKNOWN   APTT - Abnormal; Notable for the following:     APTT 53.2 (*)     All other components within normal limits    Narrative:     Indicate which anticoagulants the patient is on:->UNKNOWN   TROPONIN - Abnormal; Notable for the following:     Troponin I 0.82 (*)     All other components within normal limits    Narrative:     Indicate which anticoagulants the patient is on:->UNKNOWN   ESTIMATED GFR - Abnormal; Notable for the following:     GFR If  8 (*)     GFR If Non  6 (*)     All other components within normal limits   LACTIC ACID   BLOOD CULTURE    Narrative:     Per Hospital Policy: Only change Specimen Src: to \"Line\" if  specified by physician order.   BLOOD CULTURE    Narrative:     Per Hospital Policy: Only change Specimen Src: to \"Line\" if  specified by " physician order.   COD (ADULT)   ABO AND RH CONFIRMATION   ANTIBODY IDENTIFICATION   MAGNESIUM   LACTIC ACID   URINE CULTURE(NEW)   URINALYSIS,CULTURE IF INDICATED   URINE DRUG SCREEN   HEMOGLOBIN A1C   TSH WITH REFLEX TO FT4   FLUID CELL COUNT   GRAM STAIN ONLY   CAPD FLUID CULTURE   HEPATITIS PANEL ACUTE(4 COMPONENTS)   ACCU-CHEK GLUCOSE     All labs reviewed by me.    EKG    Results for orders placed or performed during the hospital encounter of 19   EKG (NOW)   Result Value Ref Range    Report       [facility] Emergency Dept.    Test Date:  2019  Pt Name:    GHAZAL BENEDICT            Department: ER  MRN:        [id number]                      Room:       RD 09  Gender:     Male                         Technician: [id number]  :        1950                   Requested By:CHRISTIANO ULLOA  Order #:    [id number]                    Reading MD: CHRISTIANO ULLOA MD    Measurements  Intervals                                Axis  Rate:       93                           P:          70  TN:         172                          QRS:        -60  QRSD:       174                          T:          131  QT:         448  QTc:        558    Interpretive Statements  ATRIAL-SENSED VENTRICULAR-PACED RHYTHM  NO FURTHER ANALYSIS ATTEMPTED DUE TO PACED RHYTHM  BASELINE WANDER IN LEAD(S) I,III,aVL  Compared to ECG 2018 08:32:30  Atrial premature complex(es) no longer present  Ventricular premature complex(es) no longer present    Electronically Signed On 20 8:51:16 PDT by CHRISTIANO ULLOA MD         RADIOLOGY  CT-CTA NECK WITH & W/O-POST PROCESSING   Final Result      Unremarkable CT angiogram of the neck. No high-grade stenosis, large vessel occlusion, aneurysm or dissection.      CT-CTA HEAD WITH & W/O-POST PROCESS   Final Result      1.  No large vessel occlusion, high-grade stenosis or aneurysm of the [place].   2.  Suprasellar aneurysm coiling.      CT-RENAL  COLIC EVALUATION(A/P W/O)   Final Result         1. No acute abnormality in the abdomen or pelvis.   2. Absent left kidney. Unremarkable right kidney. No hydronephrosis.   3. Bibasilar atelectasis/scarring. No pleural effusions.      CT-CEREBRAL PERFUSION ANALYSIS   Final Result      1.  Cerebral blood flow less than 30% likely representing completed infarct = 0 mL.      2.  T Max more than 6 seconds likely representing combination of completed infarct and ischemia = 0 mL.      3.  Mismatched volume likely representing ischemic brain/penumbra = 0 mL.      4.  Please note that the cerebral perfusion was performed on the limited brain tissue around the basal ganglia region. Infarct/ischemia outside the CT perfusion sections can be missed in this study.      CT-HEAD W/O   Final Result      1.  No CT evidence of acute infarct, hemorrhage or mass.   2.  Small to moderate global parenchymal atrophy and chronic small vessel ischemic changes.      DX-CHEST-PORTABLE (1 VIEW)   Final Result         1. Nonspecific bilateral interstitial and alveolar opacities, likely mild edema. No significant pleural effusions.   2. Mild cardiomegaly.        The radiologist's interpretation of all radiological studies have been reviewed by me.      COURSE & MEDICAL DECISION MAKING  Pertinent Labs & Imaging studies reviewed. (See chart for details)    I reviewed the patient's medical records which showed that the patient has a history of diabetes and had a stroke in the past. He was seen in this facility in January of last year. He is currently on Coumadin. Patient was last seen at the [facility] on March 17, 2019 for hematuria.     8:04 AM - Patient seen and examined at bedside. Ordered IV saline, oxygen, cardiac monitoring, lactic acid, CBC, CMP, urinalysis, urine culture, blood culture, DX- chest, EKG,  Prothrombin, APTT, COD, Troponin, estimated GFR, ABO and RH confirmation to evaluate his symptoms. The differential diagnoses include but  are not limited to: Stroke vs. Sepsis vs. Metabolic    HYDRATION: Based on the patient's presentation of Dehydration the patient was given IV fluids. IV Hydration was used because oral hydration was not adequate alone. Upon recheck following hydration, the patient was feeling improved.    10:02 AM - Internal medicine and Nephrology paged.     10:05 AM - Spoke with Dr. Mullen, Nephrologist, about the patient's condition.     10:26 AM - Spoke with Dr. Nichole, Hospitalist, about the patient's condition. He agrees to accept the patient for admission.     12:24 PM - Patient was reevaluated at bedside and discussed the plan for admission. The patient understands and agrees.       Decision Making:  Patient with altered mental status of uncertain etiology, the patient has dysarthria, performed a stroke protocol in the patient, with no obvious etiology.  Patient does have peritoneal dialysis, he does have worsening kidney failure, discussed the case with Dr. Mullen for consultation, discussed the case with the hospitalist for admission the hospital.      DISPOSITION:  Patient will be admitted to hospitlalist in guarded condition.      FINAL IMPRESSION  1. Altered mental status, unspecified altered mental status type    2. Dysarthria    3. Elevated troponin          I, Courtney Villegas (Scribe), am scribing for, and in the presence of, Gene Brandt M.D..    Electronically signed by: Courtney Villegas (Scribe), 4/13/2019    IeGne M.D. personally performed the services described in this documentation, as scribed by Courtney Villegas in my presence, and it is both accurate and complete. C    The note accurately reflects work and decisions made by me.  Gene Brandt  4/13/2019  3:19 PM

## 2019-04-14 NOTE — PROGRESS NOTES
Orders are in place for cortrak tube insertion and tube feedings. Charge RN Juan notified. Patient and family were made aware of the orders for tube feeding and NPO (not to eat or drink anything by mouth). Family member verbalized understanding.

## 2019-04-14 NOTE — PROGRESS NOTES
8645  Patient's in bed. No changes in status. Bedside report given to Sherita JEFFERS RN (Eligio).

## 2019-04-14 NOTE — PROGRESS NOTES
Dialysis was paged because patient is reporting a pinching sensation in his bladder. I was given the number for the dialysis nurse of [phone]. Dialysis nurse was called and notified of the above. Dialysis nurse stated she was on her way    2312: Dialysis nurse David is at patient's bedside.     2313: Dialysis nurse stated patient is not currently experiencing the prior pain and machine appears as though it is functioning.     2314: David has left the unit

## 2019-04-14 NOTE — PROGRESS NOTES
This Dialysis RN was called regarding low drain volume alarm and a complaint of like pinching sensation on patient's bladder and according to primary RN Eligio's description that the machine drain volume had passed 1500 plus ml but slowly progressing the draining volume at this point but when I came, the machine is already on the dwell #2 mode and appeared working properly, and patient claimed not experiencing pain @ this time, Report given to RN ARVIND Guerrier. I spent about an hour for this trouble shooting process.

## 2019-04-14 NOTE — THERAPY
"Physical Therapy Evaluation completed.   Bed Mobility:  Supine to Sit: Minimal Assist  Transfers: Sit to Stand: Contact Guard Assist  Gait: Level Of Assist: Minimal Assist with Front-Wheel Walker       Plan of Care: Will benefit from Physical Therapy 4 times per week  Discharge Recommendations: Equipment: Will Continue to Assess for Equipment Needs. Post-acute therapy Discharge to a transitional care facility for continued skilled therapy services.    See \"Rehab Therapy-Acute\" Patient Summary Report for complete documentation.     "

## 2019-04-14 NOTE — PROGRESS NOTES
Dialysis nurse paged via the  at [phone] as patient is again reporting abd pain and the machine is showing a low drain volume message again

## 2019-04-14 NOTE — PROGRESS NOTES
"Patient reported that abd pain had subsided earlier but has now come back. PD machine continues to periodically alarm and display \"low drain volume\". PD RN David was called. PD RN stated he would come take a look at it again.    0139: PD RN notified machine is reading \"error 1031\" and will not stop alarming. PD RN stated to turn off the machine. PD RN also stated that he will be here shortly.    0142: power switch on the rear of the machine was turned to the off position per direction of PD nurse    0209: PD RN is at bedside  "

## 2019-04-14 NOTE — DIETARY
"Nutrition Support/TF Assessment:  Day 1 of admit.  Efrain Martines is a 69 y.o. male with admitting DX of Altered Level of consciousness, ESRD with peritoneal dialysis.      Current problem list:  1. CVA  2. ESRD with PD QHS  3. Chronic anticoagulation  4. DM  5. HTN  6. Elevated troponin  7. High anion gap metabolic acidosis  8. Leukocytosis  9. History of mechanical fall without injury     Assessment:  Estimated Nutritional Needs based on:   Height: 180.3 cm (5' 11\")  Weight: 89 kg (196 lb 3.4 oz)  Weight to Use in Calculations: 89 kg (196 lb 3.4 oz) (Pt confirmed this is his UBW)  Ideal Body Weight: 78 kg (172 lb)  Percent Ideal Body Weight: 114.1  Body mass index is 27.37 kg/m².     Calculation/Equation: MSJ X 1.2= .   Total Calories / day: 4054-3459 (Calories / kg: ~23-26)  Total Grams Protein / day: 107 - 125  (Grams Protein / k.2 - 1.4)  Total Free Water/day: 4594-4345 (~25-30 ml/kg)     Evaluation:   1. TF appropriate due to failed swallow evaluation.   2. Feeding tube confirmed in stomach today.   3. Pertinent Labs: BUN 61, Creat 8.23, HbA1c of 9.3 on .  FSBS:  (). No recent phos to assess. Pt on PD and Nephrology following.   4. Pertinent Meds: Calcitriol, Vit B-12, SSI, Bowel protocol.  5. Pt states he does not like feeding tube. Stressed importance of following SLP recommendations to prevent risk for aspiration. Pt nodded head but stated TF will likely be out tomorrow.   6. Pt reports wt stable with no recent wt loss and eating ~ 1 meal/day PTA which is his \"usual\" intake. Stressed importance of consistent meals throughout the day.      Malnutrition Risk: does not meet criteria.      Recommendations/Plan:  1. Begin Diabetisource AC @ 25 ml/hr and advance per TF protocol to goal rate of 70 ml/hr to provide 2016 kcals/day, 101 g pro/day, 1378 ml free water/day.   2. Monitor Renal labs. Need new phos labs.   3. Advance diet when determined safe per SLP or safest diet per pt wishes. "   4. Fluids per MD.   5. RD following.

## 2019-04-14 NOTE — PROGRESS NOTES
Cortrak Placement    Tube Team verified patient name and medical record number prior to tube placement.  Cortrak tube (43 inches, 10 [place]) placed at 75 cm in right nare.  Per Cortrak picture, tube appears to be in the stomach.  Nursing Instructions: Awaiting KUB to confirm placement before use for medications or feeding. Once placement confirmed, flush tube with 30 ml of water, and then remove and save stylet, in patient medication drawer.

## 2019-04-14 NOTE — PROGRESS NOTES
Inpatient Anticoagulation Service Note  Date: 4/14/2019  Reason for Anticoagulation: Aortic Mechanical Valve Replacement     Hemoglobin Value: (!) 12.6  Hematocrit Value: (!) 38  Lab Platelet Value: 199  Target INR: 2.0 to 3.0 (ordered 2.5-3.5 by primary team)  INR from last 7 days     Date/Time INR Value    04/14/19 1102 (!)  5.56    04/13/19 0805 (!)  4.8        Dose from last 7 days     Date/Time Dose (mg)    04/14/19 1400  0        Average Dose (mg): TBD (Home Dose:  2.5 mg Tu/We and 5 mg all other days)  Significant Interactions: Statin, Thyroid Medications  Bridge Therapy: No   Reversal Agent Administered: Not Applicable  Comments: INR above goal and trending up. Clarification of INR goal pending for charted aortic mechanical valve replacement by primary team (typically 2.0-3.0 pending other risk factors). H/H low. PLT unremarkable. No overt bleeding noted. Warfarin interactions noted. Will hold warfarin today. INR with AM labs. May need dose adjustments. Tube feeding noted.    Plan:  HOLD warfarin 4/14/19  Education Material Provided?: No (chronic warfarin patient)  Pharmacist suggested discharge dosing: warfarin 2.5 mg Tu/We and 5 mg all other days    Aly Omalley, PharmD     Pharmacy Addendum:  Spoke with on-call provider Dr. Contreras. No changes to current target INR but provider will pass on to day team to review tomorrow. Pharmacy will continue to follow.     Aly Omallye, PharmD

## 2019-04-14 NOTE — PROGRESS NOTES
Pt aseptically disconnected to [facility] at 1025, Effluent clear, yellow , no fibrin noted. PD site assessed, redness noted, no drainage. PD dressing changed, CDI. Pt denies any discomfort. VSS. Report given to primary RN.

## 2019-04-14 NOTE — PROGRESS NOTES
Nephrology Daily Progress Note    Date of Service  4/14/2019    Chief Complaint  69 y.o. male admitted 4/13/2019 with expressive aphasia.  Found by wife on floor unresponsive in pool of urine and feces with PD catheter uncapped.     Interval Problem Update  4/13/19 - Renal consult.  Ordered PD.  Sent PD fluid for cell count, gram stain, and culture because of possible contamination prior to admission.  4/14/19 - Feels fine.  Speech is now ~normal.     Review of Systems  Review of Systems   Unable to perform ROS: Language        Physical Exam  Temp:  [36.3 °C (97.4 °F)-36.8 °C (98.3 °F)] 36.8 °C (98.3 °F)  Pulse:  [] 90  Resp:  [12-21] 18  BP: (104-141)/(51-79) 137/79  SpO2:  [91 %-100 %] 96 %    Physical Exam   Constitutional: He appears well-developed and well-nourished.   HENT:   Head: Normocephalic and atraumatic.   Eyes: Pupils are equal, round, and reactive to light. Conjunctivae and EOM are normal.   Neck: Normal range of motion. Neck supple.   Cardiovascular: Normal rate and regular rhythm.    Pulmonary/Chest: Effort normal and breath sounds normal.   Abdominal: Soft. Bowel sounds are normal.   PD catheter in place.   Musculoskeletal: Normal range of motion.   Neurological: He is alert.   Skin: Skin is warm and dry.       Fluids    Intake/Output Summary (Last 24 hours) at 04/14/19 0850  Last data filed at 04/14/19 0600   Gross per 24 hour   Intake            282.5 ml   Output                0 ml   Net            282.5 ml       Laboratory  Recent Labs      04/13/19   0805  04/14/19   0321   WBC  11.6*  9.1   RBC  4.33*  4.08*   HEMOGLOBIN  13.0*  12.6*   HEMATOCRIT  41.2*  38.0*   MCV  95.2  93.1   MCH  30.0  30.9   MCHC  31.6*  33.2*   RDW  47.1  46.2   PLATELETCT  189  199   MPV  11.3  11.6     Recent Labs      04/13/19   0805  04/14/19   0321   SODIUM  142  140   POTASSIUM  4.9  5.1   CHLORIDE  104  106   CO2  24  21   GLUCOSE  137*  114*   BUN  63*  61*   CREATININE  8.44*  8.23*   CALCIUM  10.4  9.9      Recent Labs      04/13/19   0805   APTT  53.2*   INR  4.80*         Recent Labs      04/14/19   0321   TRIGLYCERIDE  186*   HDL  16*   LDL  46       Assessment/Plan  No new Assessment & Plan notes have been filed under this hospital service since the last note was generated.  Service: Nephrology     Impression:   ESRD on [facility]   TIA   DM   HTN   Aortic valve replacement   Pacemaker    Plan:   PD tonight with 2.5% solution.

## 2019-04-14 NOTE — PROGRESS NOTES
Internal Medicine Interval Note  Note Author: Corby Ravi M.D.     Name Efrain Martines 1950   Age/Sex 69 y.o. male   MRN [id number]   Code Status FULL     After 5PM or if no immediate response to page, please call for cross-coverage  Attending/Team: Salvatore/Nicole See Patient List for primary contact information  Call [phone] to page    1st Call - Day Intern (R1):   Breonna 2nd Call - Day Sr. Resident (R2/R3):   Gama Ortega         Reason for interval visit  (Principal Problem)   Acute CVA with expressive aphasia       Interval Problem Daily Status Update  (24 hours, problem oriented, brief subjective history, new lab/imaging data pertinent to that problem)     Failed swallow eval yesterday; now with Cortrak. SLP following, appreciate their input.    PD ongoing, appears to be going slowly, needing several interventions from Nephro RN, appreciate their help in caring for Mr Martines.     Expressive aphasia significantly improved. He is verbal, speech is intelligible. Wife says speech is still slow.  He wants to eat and would rather be d/c'ed home instead of acute rehab.     PT/OT/ Physiatry consult pending.     Pharmacy to dose warfarin.   Following INR.     Review of Systems   Constitutional: Negative for chills, fever and malaise/fatigue.   HENT: Negative for congestion, sinus pain and sore throat.    Eyes: Positive for blurred vision. Negative for double vision and photophobia.        Due to previous CVA   Respiratory: Negative for cough, hemoptysis, shortness of breath and wheezing.    Cardiovascular: Positive for orthopnea. Negative for chest pain, palpitations and PND.        Chronic orthopnea   Gastrointestinal: Negative for abdominal pain, constipation, heartburn, nausea and vomiting.   Genitourinary: Negative.    Musculoskeletal: Positive for back pain. Negative for joint pain.   Neurological: Positive for tremors. Negative for dizziness and weakness.         Baseline tremor   Psychiatric/Behavioral: Negative.       But signs that he has no pain.     Disposition/Barriers to discharge:   IP for permissive HTN, anticipate d/c to acute rehab, although pt would rather d/c home.   Physiatry yet to see.     Consultants/Specialty  Nephrology     PCP: No primary care provider on file.      Quality Measures  Quality-Core Measures   Reviewed items::  Labs reviewed and Medications reviewed          Physical Exam       Vitals:    04/13/19 2025 04/14/19 0000 04/14/19 0336 04/14/19 0400   BP:  104/61  116/69   Pulse: 72 77 84 86   Resp: 18 16  16   Temp:  36.7 °C (98 °F)  36.6 °C (97.9 °F)   TempSrc:  Temporal  Temporal   SpO2: 98% 91% 92% 94%   Weight:       Height:         Body mass index is 27.37 kg/m². Weight: 89 kg (196 lb 3.4 oz)  Oxygen Therapy:  Pulse Oximetry: 94 %, O2 (LPM): 0, O2 Delivery: None (Room Air)    Physical Exam   Constitutional: He is oriented to person, place, and time and well-developed, well-nourished, and in no distress. No distress.   HENT:   Head: Normocephalic and atraumatic.   Mouth/Throat: No oropharyngeal exudate.   Dry mucous membrane  Edentulous    Eyes: Pupils are equal, round, and reactive to light. Conjunctivae and EOM are normal. Right eye exhibits no discharge. Left eye exhibits no discharge. No scleral icterus.   Neck: Normal range of motion. Neck supple. No JVD present. No tracheal deviation present. No thyromegaly present.   Cardiovascular: Normal rate and regular rhythm.    Murmur heard.  1+ peripheral pulses through out   Abdominal: Soft. Bowel sounds are normal. He exhibits distension. There is no tenderness. There is no rebound and no guarding.   Some epigastric tenderness (likely from tip of Cortrak)   Musculoskeletal: Normal range of motion. He exhibits no edema, tenderness or deformity.   Neurological: He is alert and oriented to person, place, and time. No cranial nerve deficit. GCS score is 15.   No facial droop.   Aphasia  improved  Strength and sensation intact and symmetric.   A&O x3   Skin: Skin is warm and dry. No rash noted. He is not diaphoretic. No erythema. No pallor.   Psychiatric: Mood, memory, affect and judgment normal.   Nursing note and vitals reviewed.        Assessment/Plan     * CVA (cerebral vascular accident) (HCC)- (present on admission)   Assessment & Plan    Aphasia significantly improved. Per wife he is not yet back to baseline speech-beal. Has Cortrak in place.     Plan:  Permissive hypertension  Q 4HR neurochecks ongoing  Atorvastatin    PT/OT pending   Seen by SLP, now on Cortrak, ice chips with RN supervision   Physiatry consult pending, although pt expresses desire to d/c home     ESRD (end stage renal disease) ([facility])- (present on admission)   Assessment & Plan    Gets PD QHS per Nephrology.     Plan:  Nephrology following, appreciate their input: peritoenal fluid clx pending, got one dose of Ancef + gentamycin.     Chronic anticoagulation- (present on admission)   Assessment & Plan    With coumadin for AVR. INR 4.80.     Neuroimaging neg for hemorrhage.       Plan:  Hold AC, Pharmacy to dose warfarin   SCDs for DVt ppx  No ASA for now       Diabetes ([facility])- (present on admission)   Assessment & Plan    A1C pending.     Plan:  NPO pending SLP eval --> diabetic diet  SSI with hypoglycemic protocol     Hypertension- (present on admission)   Assessment & Plan    Plan:    Holding home antihypertensives for permissive hypertension.      Elevated troponin- (present on admission)   Assessment & Plan    He has elevated troponin in the setting of ESRD and paced VR. Not concerning for ACS.     Plan:  He was seen by nephrology.           High anion gap metabolic acidosis- (present on admission)   Assessment & Plan    Improving with PD    Plan:  Nephology following will continue to get PD     Leukocytosis- (present on admission)   Assessment & Plan    RESOLVED       History of mechanical fall without injury- (present on  admission)   Assessment & Plan    Fell on the day prior to admission and was found down the morning of admission.       Plan:  Fall/Seizure precautions  PT/OT pending         Hypothyroidism- (present on admission)   Assessment & Plan      Plan:  Continue home synthroid  TSH/R T4 pending     Hyperlipidemia- (present on admission)   Assessment & Plan    Was on home atorvastatin 40.     Plan:  Atorvastatin 80

## 2019-04-14 NOTE — PROGRESS NOTES
Patient now able to answer questions. Patient's speech is clearer and now understandable to this listener. Patient refused for me to check his blood sugar. Patient was educated on why blood sugar is checked at this time and that we will start his tube feeding soon. Patient again refused to have his blood sugar checked. Patient stated he is willing to have food through his tube, but when asked if patient was willing to have us check his blood sugar and give him insulin, patient repeatedly said the word 'no', shook his head side to side, and refused to let me see one of his fingers.

## 2019-04-14 NOTE — CARE PLAN
Problem: Safety  Goal: Will remain free from injury  Outcome: PROGRESSING AS EXPECTED  Safety precaution in effect. Non skid socks in use. Call light within reach. Reminded patient to call for assist. Hourly rounds in effect.     Problem: Infection  Goal: Will remain free from infection  Outcome: PROGRESSING AS EXPECTED  Implement standard precaution. Hand washing every encounter & before & after patient care. Discussed with patient & spouse.     Problem: Knowledge Deficit  Goal: Knowledge of disease process/condition, treatment plan, diagnostic tests, and medications will improve  Outcome: PROGRESSING AS EXPECTED  Discussed Plan of care with patient & spouse. Questions answered. Verbalized understanding.

## 2019-04-14 NOTE — PROGRESS NOTES
Patient still refusing bs checks and insulin, calcitriol capsule was not given because its structure cannot be opened or crush    Page sent to r purple resident    0640: Dr. Aquino notified of the above. MD verbalized understanding. MD advised to continue to hold the calcitriol

## 2019-04-14 NOTE — THERAPY
"Occupational Therapy Evaluation completed.   Functional Status:  Min A supine to sit.  Max A LB dressing.  Pt stood with min A and walked hallway with FWW.  Pt losing balance to R side during functional mobility.  Pt declined further activity/ADL's.  Pt returned to supine supervised.  Plan of Care: Will benefit from Occupational Therapy 4 times per week  Discharge Recommendations:  Equipment: Will Continue to Assess for Equipment Needs.     Pt is 68 y/o M seen for OT evaluation. Pt admitted with expressive aphasia after a recent fall. Pt with PMH ESRD, diabetes, HTN, and CVA. Pt currently presents with impaired balance, functional mobility, and self-care. Pt has poor insight into current deficits. Pt will continue to benefit from acute OT services and would also benefit from further therapy at a post acute facility prior to DC home.    See \"Rehab Therapy-Acute\" Patient Summary Report for complete documentation.    "

## 2019-04-14 NOTE — PROGRESS NOTES
Primary RN called d/t low drain volume alarm and abdominal discomfort. Responded to bedside; repositioned pt to left lateral; minimal drainage. Repositioned to right lateral; improved drainage.

## 2019-04-14 NOTE — PROGRESS NOTES
[facility] treatment was ordered by Dr. Hernandez, pt was stable pre treatment denies any complaint of pain and SOB, no fever or any distress were noted pre treatment. PD dressing was dry and intact, will be changed tomorrow, no swelling or redness were noted pre treatment. Lungs were clear diminished at base, effluent were clear and yellow no fibrin were noted. Gave report and machine inservice to Karla CASTAÑEDA RN, pt bed lowered and call light within reach, no distress were noted post tx. See flow sheets for further details.

## 2019-04-14 NOTE — DISCHARGE PLANNING
Aware of PMR referral from Dr. Ravi. Hx peritoneal dialysis, found down at home unresponsive, PD catheter uncapped. Expressive aphasia. Concern for stroke. CT head negative for acute abnormality. No MRI due to PPM, hx brain aneurysm coiling. Nephrology consulted. PD fluid cultures pending. Speech symptoms nearly resolved. Would appreciate initial PT/OT evals - as clinically appropriate - to assist with determination for post acute needs. No Physiatry consult ordered per protocol at this time. TCC following. Thank you for the referral.

## 2019-04-15 PROBLEM — B15.9 HEPATITIS A: Status: ACTIVE | Noted: 2019-01-01

## 2019-04-15 NOTE — PROGRESS NOTES
Surgery patient?: no  Date of surgery: n/a  Ambulated 50 ft on day of surgery? (N/A if today is not date of surgery): n/a  Number of times ambulated 50 feet or greater today: 1  Patient has been up to chair, edge of bed or HOB 90 degrees for all meals?: yes, on TF  Goal met? (goal is ambulating at least 50 feet 2 times on day shift, one time on night shift): no  If patient did not meet mobility goal, why?: x2 assist

## 2019-04-15 NOTE — PROGRESS NOTES
"Patient was offered his scheduled statin. Patient refused. It was explained to the patient that the medication is to help prevent future strokes. Patient continued refusal. Patient shook his head, repeated the word \"no\".    2326: Patient is again refusing blood sugar checks and insulin.  "

## 2019-04-15 NOTE — DISCHARGE PLANNING
Anticipated Discharge Disposition:   [facility] [place]    Action:   Message sent to Jennifer Easley about pt's admission to [facility]    Barriers to Discharge:   Pending time and date of admission to [facility]  Medical Clearance    Plan:   Follow up with Jennifer Easley at [facility] tomorrow.     Addendum:  Received a call from Jennifer stating that Dr. Mari would like a neuro consult done prior to them accepting pt.

## 2019-04-15 NOTE — NON-PROVIDER
Internal Medicine Medical Student Note  Note Author: Lenin Lemus, Student    Name Efrain Martines 1950   Age/Sex 69 y.o. male   MRN [id number]   Code Status FULL       Reason for interval visit  (Principal Problem)   CVA (cerebral vascular accident)     Interval Problem Daily Status Update  (problem status, last 24 hours, new history, new data )   68 yo male with ESRD on PD s/p left nephrectomy was found down at home following PD therapy. Brought in to the ED altered with extreme expressive aphasia.   - Today the patient was drastically improved; capable of holding a conversation, yet still struggled with word finding and wrong word use.   - Overnight, he refused finger sticks, insulin, and his statins.   - His blood glucose remains in the 200s  - The patient feels his swallow mechanics have improved; speech will re-evaluate; NG tube in place  - PT/OT pending  - Pharmacist suggested discharge dosing: warfarin 2.5 mg Tu/ and 5 mg all other days   - PD fluid culture pending    Review of Systems   Constitutional: Negative for chills, diaphoresis and fever.   HENT: Negative for congestion, hearing loss, sore throat and tinnitus.    Eyes: Negative for blurred vision, double vision and photophobia.   Respiratory: Positive for cough. Negative for hemoptysis and sputum production.    Cardiovascular: Negative for chest pain, palpitations, orthopnea and leg swelling.   Gastrointestinal: Negative for abdominal pain, constipation, diarrhea, heartburn, nausea and vomiting.   Genitourinary: Positive for frequency. Negative for dysuria, hematuria and urgency.   Musculoskeletal: Positive for back pain. Negative for joint pain, myalgias and neck pain.   Skin: Negative for itching and rash.   Neurological: Negative for dizziness, tremors, sensory change and headaches.   Endo/Heme/Allergies: Negative for polydipsia.   Psychiatric/Behavioral: The patient is nervous/anxious.        Physical Exam       Vitals:     04/15/19 0359 04/15/19 0400 04/15/19 0633 04/15/19 0800   BP:  155/77 131/65 119/98   Pulse: 98 95 98 97   Resp:  17 16 16   Temp:  36.9 °C (98.5 °F) 36.8 °C (98.2 °F) 36.3 °C (97.4 °F)   TempSrc:  Temporal Temporal Temporal   SpO2: 91% 91%  92%   Weight:       Height:         Body mass index is 29.33 kg/m². Weight: 95.4 kg (210 lb 5.1 oz)  Oxygen Therapy:  Pulse Oximetry: 92 %, O2 (LPM): 0, O2 Delivery: None (Room Air)    Physical Exam   Constitutional: He is oriented to person, place, and time and well-developed, well-nourished, and in no distress.   HENT:   Head: Normocephalic and atraumatic.   Mouth/Throat: No oropharyngeal exudate.   Eyes: Pupils are equal, round, and reactive to light. Conjunctivae and EOM are normal. No scleral icterus.   Neck: Normal range of motion. Neck supple. No thyromegaly present.   Cardiovascular: An irregular rhythm present. Tachycardia present.  Exam reveals gallop and S3.    Pulses:       Radial pulses are 1+ on the right side, and 1+ on the left side.   Pulmonary/Chest: Effort normal. No respiratory distress. He has decreased breath sounds in the right lower field and the left lower field. He has no wheezes.   Abdominal: Soft. Bowel sounds are hyperactive. There is tenderness (Mid-epigastric).   PD tube in place   Musculoskeletal: Normal range of motion. He exhibits no edema.   Neurological: He is alert and oriented to person, place, and time. No cranial nerve deficit.   Mild expressive aphasia; improved  No strength or sensation deficits  Mild gait instability   Skin: Skin is warm and dry.   Psychiatric: Affect normal.         Assessment/Plan   # Cerebral Vascular Accident  The patient presented with sudden onset expressive aphasia. Initially unable to express more than yes/no; now is able to form complete sentences, with some word finding and word use issues. The patient was noted to have a mild facial droop, but not seen on today's exam. He was found to be supra-theraputic  on coumadin; INR 4.8 on admission. CT head showed no sign of hemorrhagic stroke; the patient is unable to have an MRI due to cerebral coiling and incompatible pacemaker. The patient failed his speech eval, and has a cortrak in place. Today the patient continues to improve; his wife states his speech is about 75% of normal.   Plan:  - Allow for permissive HTN; goal SBP <160  - Repeat swallow eval  - Continue Atorvastatin and insulin therapy  - PT/OT pending  - PD fluid culture pending  - Continue TF: goal 70 ml/hour    #ESRD  By hx; pts BUN/Cr = 63/8.56; GFR=6. The patient has an elevated troponin due to ESRD  - Continue PD QHS  - PD fluid culture pending   - Nephrology to follow    #Diabetes Mellitus   A1C = 9.3; insulin dependent; home lantus 35 units QHS  - Switch from insulin sliding scale to lantus 20 units QHS; titrate up as needed; goal blood glucose 120-150

## 2019-04-15 NOTE — PROGRESS NOTES
Internal Medicine Interval Note  Note Author: Corby Ravi M.D.     Name Efrain Martines 1950   Age/Sex 69 y.o. male   MRN [id number]   Code Status FULL     After 5PM or if no immediate response to page, please call for cross-coverage  Attending/Team: Salvatore/Nicole See Patient List for primary contact information  Call [phone] to page    1st Call - Day Intern (R1):   Breonna 2nd Call - Day Sr. Resident (R2/R3):   Kd         Reason for interval visit  (Principal Problem)   Acute CVA with expressive aphasia       Interval Problem Daily Status Update  (24 hours, problem oriented, brief subjective history, new lab/imaging data pertinent to that problem)     No acute vents overnight.   He refuses some meds and glucose checks. Educated, now agreeable to take meds.     Ongoing QHS PD. Nephro managing electrolytes.     Expressive aphasia improving.     PT/OT: recommend acute rehab prior to d/c home  Physiatry consult pending.     Pharmacy to dose warfarin. Holding ASA for now.    Following INR.     Review of Systems   Constitutional: Negative for chills, fever and malaise/fatigue.   HENT: Negative for congestion, sinus pain and sore throat.    Eyes: Positive for blurred vision. Negative for double vision and photophobia.        Due to previous CVA   Respiratory: Negative for cough, hemoptysis, shortness of breath and wheezing.    Cardiovascular: Positive for orthopnea. Negative for chest pain, palpitations and PND.        Chronic orthopnea   Gastrointestinal: Negative for abdominal pain, constipation, heartburn, nausea and vomiting.   Genitourinary: Negative.    Musculoskeletal: Positive for back pain. Negative for joint pain.   Neurological: Positive for tremors. Negative for dizziness and weakness.        Baseline tremor   Psychiatric/Behavioral: Negative.       Disposition/Barriers to discharge:   IP for permissive HTN, anticipate d/c to acute rehab. He is now agreeable to  this plan. Physiatry yet to see.     Consultants/Specialty  Nephrology     PCP: No primary care provider on file.      Quality Measures  Quality-Core Measures   Reviewed items::  Labs reviewed and Medications reviewed      Physical Exam       Vitals:    04/15/19 0000 04/15/19 0359 04/15/19 0400 04/15/19 0633   BP: 139/77  155/77 131/65   Pulse: 96 98 95 98   Resp: 17  17 16   Temp: 37.7 °C (99.8 °F)  36.9 °C (98.5 °F) 36.8 °C (98.2 °F)   TempSrc: Temporal  Temporal Temporal   SpO2: 91% 91% 91%    Weight: 95.4 kg (210 lb 5.1 oz)      Height:         Body mass index is 29.33 kg/m². Weight: 95.4 kg (210 lb 5.1 oz)  Oxygen Therapy:  Pulse Oximetry: 91 %, O2 (LPM): 0, O2 Delivery: None (Room Air)    Physical Exam   Constitutional: He is oriented to person, place, and time and well-developed, well-nourished, and in no distress. No distress.   HENT:   Head: Normocephalic and atraumatic.   Mouth/Throat: No oropharyngeal exudate.     Edentulous    Eyes: Pupils are equal, round, and reactive to light. Conjunctivae and EOM are normal. Right eye exhibits no discharge. Left eye exhibits no discharge. No scleral icterus.   Neck: Normal range of motion. Neck supple. No JVD present. No tracheal deviation present. No thyromegaly present.   Cardiovascular: Normal rate and regular rhythm.    Murmur heard.  1+ peripheral pulses through out   Abdominal: Soft. Bowel sounds are normal. He exhibits no distension. There is no tenderness. There is no rebound and no guarding.   Musculoskeletal: Normal range of motion. He exhibits no edema, tenderness or deformity.   Neurological: He is alert and oriented to person, place, and time. No cranial nerve deficit. GCS score is 15.   No facial droop.   Aphasia improved  Strength and sensation intact and symmetric.   A&O x3   Skin: Skin is warm and dry. No rash noted. He is not diaphoretic. No erythema. No pallor.   Psychiatric: Mood, memory, affect and judgment normal.   Nursing note and vitals  reviewed.        Assessment/Plan     * CVA (cerebral vascular accident) (HCC)- (present on admission)   Assessment & Plan    Aphasia significantly improved. Per wife he is not yet back to baseline speech-beal. Has Cortrak in place.     Plan:  Permissive hypertension  Q 4HR neurochecks ongoing  Atorvastatin    Seen by SLP, now on Cortrak, ice chips with RN supervision   PT/OT: recommend acute rehab prior to d/c home  Physiatry consult pending     ESRD (end stage renal disease) ([facility])- (present on admission)   Assessment & Plan    Gets PD QHS per Nephrology.     Plan:  Nephrology following, appreciate their input: peritoenal fluid clx pending, got one dose of Ancef + gentamycin.     Chronic anticoagulation- (present on admission)   Assessment & Plan    With coumadin for AVR. INR 4.80 on admission     Neuroimaging neg for hemorrhage.       Plan:  Hold AC, Pharmacy to dose warfarin   SCDs for DVt ppx  No ASA for now       Diabetes ([facility])- (present on admission)   Assessment & Plan    A1C 9.3    Plan:  Cortrak with tube feeds, Nutrition following  Glargine + SSI with hypoglycemic protocol     Hypertension- (present on admission)   Assessment & Plan    BP wnl    Plan:  Monitor      Elevated troponin- (present on admission)   Assessment & Plan    He has elevated troponin in the setting of ESRD and paced VR. Not concerning for ACS.     Plan:  Getting PD            High anion gap metabolic acidosis- (present on admission)   Assessment & Plan    Improving with PD    Plan:  Nephology following will continue to get PD     Leukocytosis- (present on admission)   Assessment & Plan    RESOLVED       History of mechanical fall without injury- (present on admission)   Assessment & Plan    Fell on the day prior to admission and was found down the morning of admission.       Plan:  Fall/Seizure precautions  PT/OT: recommend acute rehab prior to d/c home         Hypothyroidism- (present on admission)   Assessment & Plan     Stable    Plan:  Continue home synthroid       Hyperlipidemia- (present on admission)   Assessment & Plan    Was on home atorvastatin 40.     Plan:  Atorvastatin 80

## 2019-04-15 NOTE — PROGRESS NOTES
0655 Patient's sitting up in bed. Spouse at the bedside. Bedside report received from NOC RN (Eligio).    0750 Patient's sitting up in bed. Dr Hammond visited. POC discussed with the patient & spouse. MD aware that patient refuses some medications, Accu check.    0840 Patient's sitting up in bed. Remains NPO. On TF of Diabetisource AC @70 ml/hr (goal rate). Spouse visiting. Fall Protocol in effect. Call light within reach. Reminded patient to call for assist. Assessment completed. No distress noted. Plan of care reviewed with the patient. Verbalized understanding. Tried giving Ice chips, patient refuses, educated the needs, still refuses. Spouse spoke to the patient, patient still refuses.    0930 Patient's in bed. Encouraged Ice chips, educated, patient still refuses.    1115 Patient's in bed, agreed on Accu check, still refuses ice chips. Notified MD who's visiting.    1300 Patient's in bed. No c/o's at this time. Offered Ice chips, still refuses. spouse at the bedside.    1350 ROSCOE Patel working with the patient.    1450 Amanda, ROSCOE notified Primary Nurse that patient she stopped patient's TF and that patient passed swallow eval and can have Dysphagia 2 with NTL, 1:1 feeder, ok to crush medications or float with applesauce.    1506 Placed a call to Dr Ravi, awaiting for response.        1511 Received a call from Dr Ravi, notified MD that per SLP's recommendation patient can have Dysphagia 2 with NTL, 1:1 feeder, crush medications/float with applesauce, new order reiceved & acknowledged of the said diet.    1526 Placed a call to Dr Ravi, awaiting for response.    1650  Re checked /76.    1800 Patient ate few bites of Dinner. 1:1 feeder. Re started TF.    1530 Received a call from Khadar, notified MD of the patient's MD of patient's /126    1531 New order received & acknowledged from Dr Ravi (see MAR).    1548 Medicated with Hydralazine (see MAR)  for /126.

## 2019-04-15 NOTE — THERAPY
"Speech Language Therapy dysphagia treatment completed.   Functional Status:  The patient was seen for dysphagia therapy on this date. The patient was awake, alert, oriented and presenting with symptoms of mild expressive aphasia. PO trials of ice chips, NTL, soft solids, thin liquids and mixed consistencies were presented to the patient. The patient was able to consume ice chips, NTL and soft solids without any s/sx of aspiration/penetration. Coughing appreciated following 100% of thin liquid trials and 50% of mixed consistency trials, which remains concerning for aspiration/penetration. O2 levels were noted to drop during mastication, patient benefited from cues to continue breathing while chewing on bolus, reminders to take small bites and sips were also beneficial in reducing aspiration risk. Voice remained clear throughout the therapy session. Recommend dysphagia 2/NTL diet with 1:1 supervision to ensure adherence to swallow strategies (small bites and sips, breathe while chewing).   Recommendations:  Recommend dysphagia 2/NTL diet with 1:1 supervision to ensure adherence to swallow strategies.     Plan of Care: Will benefit from Speech Therapy 5 times per week  Post-Acute Therapy: Discharge to a transitional care facility for continued skilled therapy services.    See \"Rehab Therapy-Acute\" Patient Summary Report for complete documentation.     "

## 2019-04-15 NOTE — DISCHARGE PLANNING
Outpatient Dialysis Note    Confirmed patient is active at:    [facility]  [address]  [place] [address]  Phone: [phone] Fax: [phone]     Modality: PD    Spoke with Fernando at the facility who confirmed. Forwarded records for review.    Dialysis Coordinator- [facility],  Magen Moreno [phone]

## 2019-04-15 NOTE — DISCHARGE PLANNING
Case reviewed with Physiatry. Will await Neurology consult prior to IRF admission. CHAYA sorensen.

## 2019-04-15 NOTE — DISCHARGE PLANNING
Physiatry Dr. Mari recommending pt appropriate for acute rehab. Will follow for medical clearance/bed availability in anticipation of transition to [facility].

## 2019-04-15 NOTE — CONSULTS
Medical chart review completed.     Patient is a 69 y.o. year-old male with a past medical history significant for end-stage renal disease on hemodialysis, AVR on Coumadin, diabetes with peripheral neuropathy, hypertension, hyperlipidemia, cerebral aneurysm status post coiling in 1990s, pacemaker for high degree AV block admitted to [facility] on 4/13/2019  7:54 AM after ground-level fall with altered level of consciousness.  He was incontinent of stool and urine.    He was supratherapeutic with his INR on presentation    Head CT is negative for bleed  MRI is contraindicated    Patient was found to have dysphasia, status post core tract placement      Physical Therapy Evaluation completed.   Bed Mobility:  Supine to Sit: Minimal Assist  Transfers: Sit to Stand: Contact Guard Assist  Gait: Level Of Assist: Minimal Assist with Front-Wheel Walker         Occupational Therapy Evaluation completed.   Functional Status:  Min A supine to sit.  Max A LB dressing.  Pt stood with min A and walked hallway with FWW.  Pt losing balance to R side during functional mobility.  Pt declined further activity/ADL's.  Pt returned to supine supervised.    Speech Language Therapy Clinical Swallow Evaluation completed.  Functional Status: Clinical swallow evaluation completed this date. Pt alert, wife at bedside. Pt presents with nonfluent expressive aphasia, attempting to speak but with significant word finding difficulty, perseverations. Pt able to state his name and answer that he is an a hospital given verbal forced choice. Oral [facility] exam concerning for oral apraxia as pt demonstrated difficulty coordinating movements and executing a volitional cough/throat clear. Pt otherwise able to follow 1-step directives. Pt edentulous, wet vocal quality noted at baseline. Pt presented with ice chips, NTL via tsp/cup, thins via tsp/cup, purees. Oral phase unremarkable. Pharyngeal swallow trigger was timely though hyolaryngeal excursion  "palpated as slow, reduced, sluggish. Wet/gurgly vocal quality occurred following intake of thins via cup. Delayed reflexive wet throat clearing noted with intake of purees and nectar. When asked if he felt any residue in his throat, pt responded \"yes, a little.\" Pt had difficulty executing a strong throat clear or cough. Pt does not appear safe for a PO diet at this time as he is presenting with s/sx of aspiration with conservative PO trials of modified textures. Recommend NPO with Cortrak for alternative means of nutrition/hydration. Pt may benefit from a diagnostic swallow evaluation prior to advancing to PO alimentation, given neurologic changes and potential for silent aspiration.      Recommendations - Diet: NPO, Pre-Feeding Trials with SLP Only; up to 5 ice chips/hr ok with nsg supervision as long as pt is upright, alert. STOP with any overt s/sx of aspiration.    PMH:  Past Medical History:   Diagnosis Date   • Cerebral aneurysm 1990's    w/clot into L kidney    • Cervical radiculopathy    • Chronic hepatitis C (HCC)    • Chronic kidney disease     stage 4   • CVA (cerebral vascular accident) (HCC) 1993    residual right sided weakness   • DM (diabetes mellitus) (HCC)     type 2   • HTN (hypertension)    • Hyperlipidemia    • Hypothyroidism    • Obstructive sleep apnea     cpap nightly    • Pacemaker    • Peripheral neuropathy        PSH:  Past Surgical History:   Procedure Laterality Date   • AORTIC VALVE REPLACEMENT     • MITRAL VALVE REPLACEMENT     • NEPHRECTOMY LAPAROSCOPIC Left        FAMILY HISTORY:  Family History   Problem Relation Age of Onset   • No Known Problems Mother    • No Known Problems Father        MEDICATIONS:  Current Facility-Administered Medications   Medication Dose   • sevelamer carbonate (RENVELA) tablet 800 mg  800 mg   • MD Alert...Warfarin per Pharmacy     • menthol-methyl salicylate (BENGAY) ointment     • artificial tears 1.4 % ophthalmic solution 1 Drop  1 Drop   • lidocaine " (LIDODERM) 5 % 1 Patch  1 Patch   • insulin lispro (HUMALOG) injection 1-6 Units  1-6 Units    And   • glucose 4 g chewable tablet 16 g  16 g    And   • dextrose 50% (D50W) injection 25 mL  25 mL   • Respiratory Care per Protocol     • Pharmacy Consult: Enteral tube insertion - review meds/change route/product selection  1 Each   • atorvastatin (LIPITOR) tablet 80 mg  80 mg   • cyanocobalamin (VITAMIN B-12) tablet 500 mcg  500 mcg   • levothyroxine (SYNTHROID) tablet 125 mcg  125 mcg   • senna-docusate (PERICOLACE or SENOKOT S) 8.6-50 MG per tablet 2 Tab  2 Tab    And   • polyethylene glycol/lytes (MIRALAX) PACKET 1 Packet  1 Packet    And   • magnesium hydroxide (MILK OF MAGNESIA) suspension 30 mL  30 mL    And   • bisacodyl (DULCOLAX) suppository 10 mg  10 mg   • acetaminophen (TYLENOL) tablet 650 mg  650 mg   • calcitRIOL (ROCALTROL) capsule 0.25 mcg  0.25 mcg   • calcitRIOL (ROCALTROL) capsule 0.5 mcg  0.5 mcg   • ergocalciferol (CALCIFEROL) drops 50,000 Units  50,000 Units       ALLERGIES:  Patient has no known allergies.    PSYCHOSOCIAL HISTORY:  Living Site:  Home  Living With:  spouse  Caregiver's availability:  spouse  Number of stairs:  1  Previously using front wheel walker single-point cane    Possible CVA:  -Unable to evaluate with MRI secondary to pacemaker  -Consult neurology is pending  -We will need clarification on anticoagulation in the setting    Rehab: Impaired ADLs  - If officially diagnosed with CVA by neurology and recommendations of anticoagulation, secondary prophylaxis are met, patient would be a good candidate for acute rehabilitation.  -We will need clarification of disposition support        The patient presents  with cognitive deficits and functional deficits in mobility/self-cares/swallowing/speech, and Minimal  de-conditioning. Pre-morbidly, this patient lived in a single level home with One steps to enter,with spouse  The patient was evaluated by acute care Physical Therapy,  Occupational Therapy and Speech Language Pathology; currently requiring moderate assistance for mobility and moderate assistance for ADLs, also with ongoing cognitive and swallowing deficits.     The patient is   a Very Good candidate for an acute inpatient rehabilitation program with a coordinated program of care at an intensity and frequency not available at a lower level of care.     Note: This recommendation requires that patient has at least CGA/Minimal Assistance needs in at least two therapy disciplines.  If patient progresses to no longer need CGA/Yung with at least two therapy disciplines they may be more appropriate for Skilled nursing facility versus home with home health.      This recommendation is substantiated by the patient's current medical condition with intervention and assessment of medical issues requiring an acute level of care for patient's safety and maximum outcome. A coordinated program of care will be provided by an interdisciplinary team including physical therapy, occupational therapy, speech language pathology, hospitalist, physiatry, rehab nursing and rehab psychology. Rehab goals include improved cognition and swallowing, mobility, self-care management, strength and conditioning/endurance, pain management, bowel and bladder management, mood and affect, and safety with independent home management including caregiver training. Estimated length of stay is approximately 21 days. Rehab potential: Very Good. Disposition: to pre-morbid independent living setting with supportive care of patient's spouse. We will continue to follow with you in anticipation of discharge to acute inpatient rehabilitation when medically stable to do so at the discretion of the attending physician. Thank you for allowing us to participate in this patient's care. Please call with any questions regarding this recommendation.    Edwin Mari D.O.

## 2019-04-15 NOTE — CARE PLAN
Problem: Safety  Goal: Will remain free from injury  Outcome: PROGRESSING AS EXPECTED  Safety precaution in effect. Call light within reach. reminded patient to call for assist. Hourly rounds in effect. Discussed with patient & spouse.    Problem: Infection  Goal: Will remain free from infection  Outcome: PROGRESSING AS EXPECTED  Implement standard precaution. Hand washing every encounter & before & after patient care. Discussed with patient & spouse.    Problem: Knowledge Deficit  Goal: Knowledge of disease process/condition, treatment plan, diagnostic tests, and medications will improve  Outcome: PROGRESSING AS EXPECTED  Discussed POC with patient & spouse. Questions answered. Verbalized understanding.

## 2019-04-15 NOTE — PROGRESS NOTES
[facility] disconnected aseptically at 0610. Patient stable, alert and oriented x 4. Clear lung sounds. VSS. PD site checked, clean, dry and intact. PD dressing changed today. Effluent clear and yellow. Report given to CRISTEL Guerrier RN.    UF net: 200 mL

## 2019-04-15 NOTE — PROGRESS NOTES
[facility] ordered by Dr Hernandez, aseptically connected at 1742. Pt alert, oriented, no needs.  Report given to LINDA Mishra RN.

## 2019-04-15 NOTE — PROGRESS NOTES
Inpatient Anticoagulation Service Note    Date: 4/15/2019  Reason for Anticoagulation: Aortic Mechanical Valve Replacement          Hemoglobin Value: (!) 12.8  Hematocrit Value: (!) 40.5  Lab Platelet Value: 167  Target INR: 2.0 to 3.0    INR from last 7 days     Date/Time INR Value    04/15/19 0336 (!)  3.22    04/14/19 1102 (!)  5.56    04/13/19 0805 (!)  4.8        Dose from last 7 days     Date/Time Dose (mg)    04/15/19 1300  2.5    04/14/19 1400  0        Average Dose (mg):  (Home Dose:  2.5 mg Tu/We and 5 mg all other days)  Significant Interactions: Statin, Thyroid Medications  Bridge Therapy: No     Reversal Agent Administered: Not Applicable  Comments: INR above goal and trending up. Clarificatin of INR goal pending for charted aortic mechanical valve replacement by primary team. H/H low. PLT unremarkable. No overt bleeding noted. Warfarin interactions noted. Will give warfarin 2.5 mg tonight to prevent INR from becoming sub-therapeutic. INR with AM labs. May need dose adjustments.    Plan:  warfarin 2.5 mg tonight to prevent INR from becoming sub-therapeutic. INR with AM labs. May need dose adjustments.  Education Material Provided?: No (chronic warfarin patient)  Pharmacist suggested discharge dosing: consider resumption on home dose of warfarin 2.5 mg Tue/Wed and 5 mg all other days with follow up INR within 72 hours of discharge.      Amanda Quinn, PharmD, BCOP

## 2019-04-16 NOTE — THERAPY
"Speech Language Therapy dysphagia treatment completed.     Functional Status: The patient was seen for dysphagia tx per MD request, as they have pulled his cortrak and would like pt to be re-evaluated as he does not like his current dysphagia diet.  RN reporting pt is not eating much due to disliking current diet.  PO trials of soft solids, crackers and thin liquids.  Pt declining trials of puree and nectars.  Pt had coughing of thins in approximately 25% of trials, which is concerning for aspiration or penetration.  With every episode of coughing, pt reported \"I was not taking my time\".  When taking very small, controlled sips, pt was able to drink thins without s/sx of aspiration.  He required moderate cueing for pacing.  Pt also ate crackers without s/sx of aspiration, however again he was given moderate cueing to reduce rate.  At this time, pt remains at risk for aspiration with an upgraded diet.  He was educated regarding risks of aspiration and recommendations for a modified diet, and reported \"I will take my chances.  I am doing pretty good.\"  If pt chooses to eat despite risk for aspiration, a dysphagia 3/thin liquid diet would be preferable as pt is edentulous with dentures.  RN aware and notifying MD of pt's risk to eat despite risk for aspiration.  SLP continues to follow.      Recommendations: 1) Dysphagia II with nectars to prevent aspiration, 2) If pt chooses to eat despite risk for aspiration, a dysphagia 3/thin liquid diet would be preferrable as pt is edentulous with dentures     Plan of Care: Will benefit from Speech Therapy 5 times per week    Post-Acute Therapy: Recommend inpatient transitional care services for continued speech therapy services.      See \"Rehab Therapy-Acute\" Patient Summary Report for complete documentation.     "

## 2019-04-16 NOTE — DIETARY
Nutrition Services: Update   Day 3 of admit.  Efrain Martines is a 69 y.o. male with admitting DX of Altered level of consciousness, ESRD on peritoneal dialysis (HCC)    Pt previously receiving Diabetisource TF @ goal rate of 70mL/hr providing 2016 kcal, 101 g protein, and 1378 mL free water. Cortrak removed per visual inspection. Has now transitioned to Diabetic DYS II (pureed/chopped)/ NTL (1:1 feeder) PO diet.     Wt on 4/16: 95.4 kg via Bed scale     Evaluation:   1.  Hx of DM, CVA, ESRD on PD QHS, chronic anticoagulation, mechanical fall without injury. PT on PD and nephrology following.  2. Pt now on PO diet with one charted meal with 25-50% intake. Noted pt usual intake PTA is 1 meal per day. Pt states dislikes the pureed food and would like chocolate pudding. Will revisit need for supplements if intake remains <50% as pt did not appear interested at this time/did not participate in discussion.   3. Wt shows potential increase of 6.4 kg (14 lbs) within 2 days. Per I/Os +784mL. Wt increase likely attributed to common variability in Bed Scale weights.    4. Per SLP recommendations on 4/15: Dys II/ NTL with 1:1 supervision    5. Labs: BUN 73, Creatinine 8.84, phos 6.0  6. Meds: calcitrol, Lantus, SSI, synthroid, sevelamer, warfarin   7. Last BM: 4/15-noted loose    Malnutrition Risk noted 4/14: does not meet criteria.     Recommendations/Plan:  1. Encourage intake of meals  2. Document intake of all meals as % taken in ADL's to provide interdisciplinary communication across all shifts.   3. Monitor weight.  4. Nutrition rep will continue to see patient for ongoing meal and snack preferences.  5. Obtain supplement order per RD as needed.    RD following

## 2019-04-16 NOTE — CARE PLAN
Problem: Venous Thromboembolism (VTW)/Deep Vein Thrombosis (DVT) Prevention:  Goal: Patient will participate in Venous Thrombosis (VTE)/Deep Vein Thrombosis (DVT)Prevention Measures    Intervention: Ensure patient wears graduated elastic stockings (VIKY hose) and/or SCDs, if ordered, when in bed or chair (Remove at least once per shift for skin check)  Pt took off his SCDs and refused for me to replace them onto his legs    0351 4/16: Patient again refused for me to place SCDs on his legs

## 2019-04-16 NOTE — PROGRESS NOTES
"       Internal Medicine Interval Note  Note Author: Corby Ravi M.D.     Name Efrain Martines 1950   Age/Sex 69 y.o. male   MRN [id number]   Code Status FULL     After 5PM or if no immediate response to page, please call for cross-coverage  Attending/Team: Alin/Nicole See Patient List for primary contact information  Call [phone] to page    1st Call - Day Intern (R1):   Breonna 2nd Call - Day Sr. Resident (R2/R3):   Kd         Reason for interval visit  (Principal Problem)   Acute CVA/TIA with expressive aphasia       Interval Problem Daily Status Update  (24 hours, problem oriented, brief subjective history, new lab/imaging data pertinent to that problem)     Expressive aphasia improving.    SLP following, appreciate their input. Pt is adamant about following SLP diet recommendations. He's still at risk of aspiration with regular diet. Agree with SLP diet consistency recommendation but patient says \"I will take my chances\".     Ongoing QHS PD. Nephro managing electrolytes.     PT/OT: recommend acute rehab prior to d/c home  Physiatry: recommends Neuro consult prior to acceptance to rehab.     Neurology to see later: CT-Head w/o pending. Pt refuses MRI, he has an aneurysmal coil and a pacemaker. MRI wanted pacemaker serial number to determine if MRI compatible but he says he will not have MRI.     Pharmacy to dose warfarin. Holding ASA for now.    Following INR.     Review of Systems   Constitutional: Negative for chills, fever and malaise/fatigue.   HENT: Negative for congestion, sinus pain and sore throat.    Eyes: Positive for blurred vision. Negative for double vision and photophobia.        Due to previous CVA   Respiratory: Negative for cough, hemoptysis, shortness of breath and wheezing.    Cardiovascular: Positive for orthopnea. Negative for chest pain, palpitations and PND.        Chronic orthopnea   Gastrointestinal: Negative for abdominal pain, constipation, " heartburn, nausea and vomiting.   Genitourinary: Negative.    Musculoskeletal: Positive for back pain. Negative for joint pain.   Neurological: Positive for tremors. Negative for dizziness and weakness.        Baseline tremor   Psychiatric/Behavioral: Negative.       Disposition/Barriers to discharge:   IP pending neuro Evaluation. Anticipate d/c to acute rehab.    Consultants/Specialty  Nephrology   PT/OT/SLP  Physiatry   Neurology      PCP: No primary care provider on file.    Quality Measures  Quality-Core Measures   Reviewed items::  Labs reviewed, Medications reviewed and Radiology images reviewed  DVT prophylaxis pharmacological::  Warfarin (Coumadin)      Physical Exam       Vitals:    04/15/19 2000 04/15/19 2300 04/15/19 2316 04/16/19 0350   BP: 148/73 143/76  144/77   Pulse: 99 100 (!) 104 (!) 101   Resp: 18 16  16   Temp: 36.3 °C (97.4 °F) 36.3 °C (97.3 °F)  36.5 °C (97.7 °F)   TempSrc: Temporal Temporal  Temporal   SpO2: 91% 91% 91% 91%   Weight:       Height:         Body mass index is 29.33 kg/m².    Oxygen Therapy:  Pulse Oximetry: 91 %, O2 (LPM): 0, O2 Delivery: None (Room Air)    Physical Exam   Constitutional: He is oriented to person, place, and time and well-developed, well-nourished, and in no distress. No distress.   HENT:   Head: Normocephalic and atraumatic.   Mouth/Throat: No oropharyngeal exudate.     Edentulous    Eyes: Pupils are equal, round, and reactive to light. Conjunctivae and EOM are normal. Right eye exhibits no discharge. Left eye exhibits no discharge. No scleral icterus.   Neck: Normal range of motion. Neck supple. No JVD present. No tracheal deviation present. No thyromegaly present.   Cardiovascular: Normal rate and regular rhythm.    Murmur heard.  1+ peripheral pulses through out   Abdominal: Soft. Bowel sounds are normal. He exhibits no distension. There is no tenderness. There is no rebound and no guarding.   Musculoskeletal: Normal range of motion. He exhibits no edema,  tenderness or deformity.   Neurological: He is alert and oriented to person, place, and time. No cranial nerve deficit. GCS score is 15.   No facial droop.   Aphasia improved  Strength and sensation intact and symmetric.   A&O x3   Skin: Skin is warm and dry. No rash noted. He is not diaphoretic. No erythema. No pallor.   Psychiatric: Mood, memory, affect and judgment normal.   Nursing note and vitals reviewed.        Assessment/Plan     * CVA (cerebral vascular accident) (HCC)- (present on admission)   Assessment & Plan    Aphasia significantly improved.     Plan:  Resuming home antihypertensives   Atorvastatin    Seen by SLP, now off Cortrak, per pt's insistence   PT/OT: recommend acute rehab prior to d/c home  Physiatry: appropriate for acute rehab pending Neuro evaluation  Neuro consult pending      ESRD (end stage renal disease) ([facility])- (present on admission)   Assessment & Plan    Gets PD QHS per Nephrology.     Plan:  Nephrology following, appreciate their input: peritoenal fluid clx NGTD, got one dose of Ancef + gentamycin.     Chronic anticoagulation- (present on admission)   Assessment & Plan    With coumadin for AVR. INR 4.80 on admission     Neuroimaging neg for hemorrhage.       Plan:  On Warfarin Pharmacy to dosing and following  No ASA for now       Diabetes ([facility])- (present on admission)   Assessment & Plan    A1C 9.3    Plan:  Cortrak with tube feeds, Nutrition following  Glargine + SSI with hypoglycemic protocol     Hypertension- (present on admission)   Assessment & Plan    BP wnl    Plan:  Resumed Lisinopril     Hepatitis A- (present on admission)   Assessment & Plan    LFTs wnl.       Plan:  Monitor  Nephrology following       Elevated troponin- (present on admission)   Assessment & Plan    He has elevated troponin in the setting of ESRD and paced VR. Not concerning for ACS.     Plan:  Getting PD            High anion gap metabolic acidosis- (present on admission)   Assessment & Plan    Improving  with PD    Plan:  Nephology following will continue to get PD     Leukocytosis- (present on admission)   Assessment & Plan    RESOLVED       History of mechanical fall without injury- (present on admission)   Assessment & Plan    Fell on the day prior to admission and was found down the morning of admission.       Plan:  Fall/Seizure precautions  PT/OT: recommend acute rehab prior to d/c home         Hypothyroidism- (present on admission)   Assessment & Plan    Stable    Plan:  Continue home synthroid       Hyperlipidemia- (present on admission)   Assessment & Plan    Was on home atorvastatin 40.     Plan:  Continue Atorvastatin 40

## 2019-04-16 NOTE — PROGRESS NOTES
Inpatient Anticoagulation Service Note    Date: 4/16/2019  Reason for Anticoagulation: Aortic Mechanical Valve Replacement          Hemoglobin Value: (!) 12.8  Hematocrit Value: (!) 40.5  Lab Platelet Value: 167  Target INR: 2.0 to 3.0    INR from last 7 days     Date/Time INR Value    04/16/19 0253 (!)  1.47    04/15/19 0336 (!)  3.22    04/14/19 1102 (!)  5.56    04/13/19 0805 (!)  4.8        Dose from last 7 days     Date/Time Dose (mg)    04/16/19 0800  5    04/15/19 1300  2.5    04/14/19 1400  0        Average Dose (mg):  (Home Dose:  2.5 mg Tu/We and 5 mg all other days)  Significant Interactions: Statin, Thyroid Medications  Bridge Therapy: No    Reversal Agent Administered: Not Applicable  Comments: INR now sub-therapeutic after significant drop continued overnight despite dose 2.5 mg dose given. No new CBC available for reveiw.  No s/sx of overt bleeding noted. No changes to warfarin-drug interactions noted since last assessment. Will give warfarin 5 mg tonight and resume home dose starting tomorrow if repeat INR shows upward trend. If INR continues to trend down, consider bridge therapy. Pharmacy will continue to monitor.      Plan:  Will give warfarin 5 mg tonight and resume home dose starting tomorrow if repeat INR shows upward trend. Pharmacy will continue to monitor.    Education Material Provided?: No (chronic warfarin patient)  Pharmacist suggested discharge dosing: consider resumption on home dose of warfarin 2.5 mg Tue/Wed and 5 mg all other days with follow up INR within 72 hours of discharge.      Amanda Quinn, PharmD, BCOP

## 2019-04-16 NOTE — PROGRESS NOTES
[facility] Nephrology Daily Progress Note    Date of Service  4/16/2019    Chief Complaint  69 y.o. male admitted 4/13/2019 with expressive aphasia.  Found by wife on floor unresponsive in pool of urine and feces with PD catheter uncapped.     Interval Problem Update  4/13/19 - Renal consult.  Ordered PD.  Sent PD fluid for cell count, gram stain, and culture because of possible contamination prior to admission.  4/14/19 - Feels fine.  Speech is now ~normal.   4/15/19 - Feels ok. CCPD is going well. Net UF is 200 mL.  4/16/19 - Pt says he feels fine. Denies any complaints/concerns. Peritoneal fluid negative. Started on dysphagia diet.    Review of Systems  Review of Systems   Constitutional: Negative.    HENT: Negative.    Eyes: Negative.    Respiratory: Negative.    Cardiovascular: Negative.    Gastrointestinal: Negative.    Genitourinary: Negative.    Musculoskeletal: Negative.    Skin: Negative.    Neurological: Negative.    Endo/Heme/Allergies: Negative.    Psychiatric/Behavioral: Negative.         Physical Exam  Temp:  [36.3 °C (97.3 °F)-36.7 °C (98.1 °F)] 36.7 °C (98.1 °F)  Pulse:  [] 105  Resp:  [16-18] 16  BP: (142-180)/() 180/104  SpO2:  [91 %-95 %] 95 %    Physical Exam   Constitutional: He appears well-developed and well-nourished.   HENT:   Head: Normocephalic and atraumatic.   Eyes: Pupils are equal, round, and reactive to light. Conjunctivae and EOM are normal.   Neck: Normal range of motion. Neck supple.   Cardiovascular: Normal rate and regular rhythm.    Pulmonary/Chest: Effort normal and breath sounds normal.   Abdominal: Soft. Bowel sounds are normal.   PD catheter in place. CDI.   Musculoskeletal: Normal range of motion.   Neurological: He is alert.   Skin: Skin is warm and dry.       Fluids    Intake/Output Summary (Last 24 hours) at 04/16/19 1004  Last data filed at 04/16/19 0800   Gross per 24 hour   Intake             1460 ml   Output              450 ml   Net             1010  ml       Laboratory  Recent Labs      04/14/19   0321  04/15/19   0336   WBC  9.1  8.5   RBC  4.08*  4.19*   HEMOGLOBIN  12.6*  12.8*   HEMATOCRIT  38.0*  40.5*   MCV  93.1  96.7   MCH  30.9  30.5   MCHC  33.2*  31.6*   RDW  46.2  48.2   PLATELETCT  199  167   MPV  11.6  11.4     Recent Labs      04/14/19   0321  04/15/19   0336  04/16/19   0253   SODIUM  140  139  139   POTASSIUM  5.1  4.6  4.2   CHLORIDE  106  104  100   CO2  21  21  24   GLUCOSE  114*  239*  295*   BUN  61*  63*  73*   CREATININE  8.23*  8.56*  8.84*   CALCIUM  9.9  9.6  10.4     Recent Labs      04/14/19   1102  04/15/19   0336  04/16/19   0253   INR  5.56*  3.22*  1.47*         Recent Labs      04/14/19   0321   TRIGLYCERIDE  186*   HDL  16*   LDL  46       Assessment/Plan  No new Assessment & Plan notes have been filed under this hospital service since the last note was generated.  Service: Nephrology     Impression:   ESRD on CCPD   TIA/CVA   DM   HTN   Aortic valve replacement   Pacemaker    Plan:   No changes to CCPD regimen. Continue CCPD.        I agree with the assessment and plan on this patient laid out by the APRN

## 2019-04-16 NOTE — PROGRESS NOTES
Assessment completed. Pt is A/Ox4. Reports pain of 0/10, declined interventions. Denies numbness and tingling; nausea or vomiting. Pt's cortrak and tube feedings were discontinued and removed. Pt did not eat much of breakfast, requested for other options. Speech therapy will come to reevaluate pt's dysphagia.     POC has been discussed and pt needs have all been met at this time. Call light, personal belongings, and bedside table are all within reach. Bed is in the lowest position and locked, and bed alarm is on.

## 2019-04-16 NOTE — PROGRESS NOTES
1715 Due medications given floated in apple sauce without problem.    1800 Patient ate few bites for his dinner. Re started TF.

## 2019-04-16 NOTE — PROGRESS NOTES
"Patient resting in room. Patient denied pain on initial assessment. Patient was willing to take his statin this evening    0355 4/16: CNA contacted me stating patient refused to have vital signs checked. I was able to convince the patient to let me check his vitals    0423 4/16: Patient still unwilling to have BS checked for insulin. Patient stated, \"no and you can tell the doctor I said no\"  "

## 2019-04-16 NOTE — CARE PLAN
Problem: Safety  Goal: Will remain free from falls    Intervention: Implement fall precautions  Call light w/in reach. Instructed pt to call for assistance before getting OOB- pt verbalized understanding.        Problem: Knowledge Deficit  Goal: Knowledge of disease process/condition, treatment plan, diagnostic tests, and medications will improve    Intervention: Explain information regarding disease process/condition, treatment plan, diagnostic tests, and medications and document in education  poc discussed- pt verbalized understanding.

## 2019-04-16 NOTE — CONSULTS
"  NEUROLOGY INPATIENT STROKE HISTORY & PHYSICAL    IDENTIFYING DATA       A.  Patient: Efrain Martines, MRN: [id number], : 1950, Bed: S143/00       B.  Data source:  Patient             Subjective accuracy/reliability of data source:  reliable.       C.  Medical records reviewed: prior inpatient notes, labs and images.       D.  Interviewer:  EMEKA Shook  Requesting Physician: Juan Ogden M.D.    Admission Date: 2019    Reason for Consultation: Expressive aphasia, facial droop, stroke?    HPI: Mr. Efrain Martines is a 69 y.o. right-handed man with PMHs as listed below including DM, ESRD on home dialysis and a distant history of seizures and two strokes surrounding the aortic valve replacement requiring chronic Coumadin (INR target 2.5-3.5?) admitted on 19 for falling and word finding difficulty. On 19 he received dialysis at home as usual. After it was done as he was getting out of the bed, he \"slipped\" out of the bed and fell to the ground. His wife heard the noise in the kitchen and came over and saw him on the ground. He denies lose consciousness or had any seizure like jerking. He recalls everything. No alleviating or exacerbating factors were identified.  No other associated symptoms noted. She could not help him get up. 911 was called and he was admitted as he showed difficulty word finding. In the ED, he was found to be dysarthric. INR was supratherapeutic to 4.8.  CBC showed some leukocytosis w/ left shift but no source of infection found and 2 days of empirical abx, it normalized. No fever reported. The patient reports his speech improved gradually although not quite back to his baseline. He denies any headaches.    He denies taking any seizure medication for a long time since he has not had any. At baseline he reports he can ambulate w/o any support. He does not drive. He admits to baseline memory deficits.     Review of systems:  1. General: No weight " change, fevers or chills, or nightsweats, +generalized fatigue.   2. Head: No headaches or dizziness or lightheadedness  3. Eyes: No vision changes, vision loss or pain  4. Respiratory: No shortness of breath, cough, sputum, or wheezing  5. Cardiac: No chest pain, palpitations, FELICIANO, or orthopnea  6. Gastrointestinal: no nausea, vomiting, no abdominal pain or change in bowel habits, no melena or hematochezia  7. Urinary: no dysuria, frequency, or hesitancy.  8. Peripheral vascular: No leg cramps  9. Neurological: per HPI  10. Musculoskeletal: no cramps or pain, no atrophy, no joints pain or stiffness.  11. Psychiatric: No anxiety or depression  12. Hematologic: no rash, no easy bleeding or bruising, no epistaxis    Past Medical History:  Past Medical History:   Diagnosis Date   • Cerebral aneurysm 1990's    w/clot into L kidney    • Cervical radiculopathy    • Chronic hepatitis C (HCC)    • Chronic kidney disease     stage 4   • CVA (cerebral vascular accident) (HCC) 1993    residual right sided weakness   • DM (diabetes mellitus) (HCC)     type 2   • HTN (hypertension)    • Hyperlipidemia    • Hypothyroidism    • Obstructive sleep apnea     cpap nightly    • Pacemaker    • Peripheral neuropathy      Past Surgical History:   Procedure Laterality Date   • AORTIC VALVE REPLACEMENT     • MITRAL VALVE REPLACEMENT     • NEPHRECTOMY LAPAROSCOPIC Left        Social History:   Social History   Substance Use Topics   • Smoking status: Former Smoker     Packs/day: 1.00     Years: 20.00     Types: Cigarettes     Quit date: 1993   • Smokeless tobacco: Never Used   • Alcohol use No       Family History:  Family History   Problem Relation Age of Onset   • No Known Problems Mother    • No Known Problems Father        Allergies:  No Known Allergies    Current Home Medications:  Prior to Admission medications    Medication Sig Start Date End Date Taking? Authorizing Provider   atorvastatin (LIPITOR) 40 MG Tab Take 40 mg by mouth  "every evening.   Yes Physician Outpatient   cyanocobalamin (VITAMIN B-12) 500 MCG Tab Take 500 mcg by mouth every day.   Yes Physician Outpatient   lidocaine (LIDODERM) 5 % Patch Apply 1 Patch to skin as directed every 24 hours. Neck and Lower back   Yes Physician Outpatient   lisinopril (PRINIVIL) 2.5 MG Tab Take 2.5 mg by mouth every 48 hours.   Yes Physician Outpatient   sevelamer (RENAGEL) 800 MG Tab Take 800 mg by mouth 3 times a day, with meals.   Yes Physician Outpatient   traZODone (DESYREL) 50 MG Tab Take 50 mg by mouth every evening.   Yes Physician Outpatient   artificial tears 1.4 % Solution Place 1 Drop in both eyes 4 times a day as needed.    Physician Outpatient   calcitRIOL (ROCALTROL) 0.25 MCG Cap Take 0.25-0.5 mcg by mouth See Admin Instructions. 0.50 mcg every Monday and Thursday morning  0.25 mcg all other mornings    Physician Outpatient   vitamin D, Ergocalciferol, (DRISDOL) 62812 units Cap capsule Take 50,000 Units by mouth every Monday.    Physician Outpatient   insulin glargine (LANTUS) 100 UNIT/ML Solution Inject 35 Units as instructed every evening.    Physician Outpatient   levothyroxine (SYNTHROID) 125 MCG Tab Take 125 mcg by mouth every morning.    Physician Outpatient   metoprolol SR (TOPROL XL) 50 MG TABLET SR 24 HR Take 75 mg by mouth every morning.    Physician Outpatient   pregabalin (LYRICA) 75 MG Cap Take 75 mg by mouth every morning.    Physician Outpatient   warfarin (COUMADIN) 5 MG Tab Take 2.5-5 mg by mouth every evening. 2.5 mg on Tuesday and Wednesday   5 mg all other days    Physician Outpatient        Physical Exam:  BP (!) 180/104   Pulse (!) 105   Temp 36.7 °C (98.1 °F) (Temporal)   Resp 16   Ht 1.803 m (5' 11\")   Wt 95.4 kg (210 lb 5.1 oz)   SpO2 95%   BMI 29.33 kg/m²     1. GEN: comfortable, in NAD  2. EYES: Pupils equals and reactive 3mm->2mm bilaterally to light direct & consensual and accommodation. No RADP. non-icteric sclerae, moist conjunctivae.  3. HENT: " Atraumatic. Oropharynx clear without lesions and normal hard and soft palates.   4. NECK: Trachea midline, supple, no lymphadenopathy.  5. CV: Normal rate and rhythm. No murmurs. No rubs, no gallops, no pheripheral edema, Radial pulses 2+ bilaterally.  6. PULM: CTA bilaterally, good inspiratory effort and no intercostal retractions.   7. ABD: Soft, non-tender, no hernia.   8. Skin: normal temp, turgor, texture, no rash, no subcutaneous nodules.  9. Psychiatric: Normal mood and affect, alert & oriented to self, persons, place and time.     10. NIHSS STROKE SCALE:  1a. Level of Consciousness        0= Alert; keenly responsive    1b. LOC Questions (Age & Month)        0= Answers both questions correctly    1c. LOC Commands (Make fist & close eyes)        0= Performs both tasks correctly    2. Best Gaze        0= Normal    3. Visual        0= No visual loss.    4. Facial palsy        0= Normal symmetrical movements    5a. L Motor arm        0= No drift; limb holds 90 (or 45) degrees for full 10 seconds    5b. R Motor arm        0= No drift; limb holds 90 (or 45) degrees for full 10 seconds    6a. L Motor leg        0= No drift; limb holds 90 (or 45) degrees for full 10 seconds    6b. R Motor leg        0= No drift; leg holds 30-degree position for full 5 seconds    7. Limb ataxia        0= Absent    8. Sensory        0= Normal; no sensory loss    9. Best language        1= Mild-to-moderate aphasia; some obvious loss of fluency or facility of comprehension, without significant limitation on ideas expressed or form of expression    10. Dysarthria        0= Normal    11. Extinction and inattention (formerly neglect)        0= No abnormality    Total = 1    Modified Trego Scale:    ___ 0: No symptoms at all    _x_ 1: No significant disability, despite symptoms (able to carry out all usual duties and activities).    ___ 2: Slight disability (unable to carry out all previous activities, but able to look after own affairs  without assistance)    ___ 3: Moderate disability (requiring some help, but able to walk without assistance)    ___ 4: Moderately severe disability (unable to walk without assistance and unable to attend to own bodily needs without assistance)    ___ 5: Severe disability (bedridden, incontinent, and requiring constant nursing care and attention)    ___ 6: Dead      Labs: personally reviewed    Lab Results  Component Value Date/Time   PLATELETCT 167 04/15/2019 0336       Lab Results  Component Value Date/Time   PROTHROMBTM 17.9 (H) 04/16/2019 0253   INR 1.47 (H) 04/16/2019 0253   APTT 53.2 (H) 04/13/2019 0805       Lab Results  Component Value Date/Time   GLUCOSE 295 (H) 04/16/2019 0253       Lab Results  Component Value Date/Time   CREATININE 8.84 (HH) 04/16/2019 0253       Lab Results  Component Value Date/Time   IFAFRICA 7 (A) 04/16/2019 0253   IFNOTAFR 6 (A) 04/16/2019 0253       Radiology/Reports: independently reviewed by me (see below).    Presumed mechanism by TOAST:  Cardioembolic    ASSESSMENT:  Review of the initial CT head showed several small lacunar old strokes in the bilateral cerebelli, L insula, L frontal periventricular white matter and R frontal cortex. Also seen is a small subacute to late acute strokes in the L frontal cortex which may explain his deficits. CTA head & neck showed no notable stenosis. EKG on my review showed regular paced rhythm.  I suspect a new recent stroke in the past 2-3 days due to cardioembolism. Seizure is less likely given his fully retained alertness per report.      RECOMMENDATIONS:  - If MRI cannot be done due to the pacemaker, repeat a CT head w/o contrast to see the evolution of the L frontal stroke.   - For anticoagulation, he has no choice but Coumadin INR 2.5-3.0.   - consult PT/OT/Speech/Nutrition  - F/u with neurology clinic in 2-4 wks.   - No further emergent neurological intervention needed.     Neurology signing off. Thank you for the consultation. Please  "feel free to call back w/ any further questions.    The above was discussed in details with the primary team including Dr. Juan Ogden M.D.    I have reviewed the images and labs results with the patient and answered all questions and explained in details the assessment and recommendation sections above. The patient expressed his understanding and agreement to the above.    Yessenia Mccullough MD  Acute Neurology Consultant  Tiger Text ID \"Fernando Mccullough\"    "

## 2019-04-16 NOTE — PROGRESS NOTES
0615  Patient's in bed. No changes in status. Bedside report given to Sherita JEFFERS RN (Eligio).

## 2019-04-16 NOTE — PROGRESS NOTES
CCPD treatment was ordered by Dr. Colunga, pt was stable pre treatment denies any complaint of pain and SOB, no fever or any distress were noted pre treatment, PD dressing were dry and intact, no swelling or redness were noted pre treatment. Dressing changed done is AM 4/15. Lungs clear bilateral diminished at base, effluent are clear and yellow no fibrin were noted. Gave report and machine inservice to Karla AYON. See flow sheets for further details.

## 2019-04-17 NOTE — PROGRESS NOTES
[facility] Services Progress Note    Dialysis RN bedside to disconnect patient from [facility] Therapy. Noted low drain volume alarm; attempted second manual drain. Low UF alarm continues. Nephrologist updated.     Patient disconnected from CCPD at 1300. Net UF -332 mL (+ fluid balance).     Report given to Primary RN.

## 2019-04-17 NOTE — PROGRESS NOTES
"PD machine beeping and reading \"low drain output\" and \"drain 1 of 5\". Patient denied abdominal pain. [facility] [phone]    0211: NANY Lamar made aware of the above. Dialysis nurse advised to press stop and then go. Dialysis also advised to call him at [phone] if the machine continued beeping and showing the same messages    0505: NANY Lamar made aware that the PD machine is displaying the same message as above and beeping again. Dialysis RN advised to hit stop then hit go and to await a call back    0514: Willard called back and stated someone will should by shortly to take a look at the machine  "

## 2019-04-17 NOTE — NON-PROVIDER
Internal Medicine Medical Student Note  Note Author: Lenin Lemus, Student    Name Efrain Martines 1950   Age/Sex 69 y.o. male   MRN [id number]   Code Status FULL       Reason for interval visit  (Principal Problem)   CVA (cerebral vascular accident)     Interval Problem Daily Status Update  (problem status, last 24 hours, new history, new data )   - Neurology read the CT brain as no new stroke; evidence of prior stroke consistent with history; recommends full-time anti-coagulation  - RNs note he refuses more than 1 fingerstick a day; agreed to allow 2 fingersticks after his 2 biggest meals  - RN notes he continues to refuse oral meds and insulin; we educated the patient on the differences between his home insulin therapy and his post-prandial glucose measuring done in the hospital; he has agreed to take his insulin  - The patient states he has been able to swallow all food with little cough; admits to not eating much because of the poor food quality and denies further swallowing  deficits.   - The patient had elevated BP overnight at 180/104; the patient has Hydralazine 10mg prn, but was not given any  - The patient is currently on Lisinopril 5mg Qd; at home he states he takes metoprolol 75mg Qam  - PD fluid cultures return: no growth at 72 hours   - Notes are unclear about the patient's past surgical history; pt is unsure if he has had one or two valves replaced. [facility] to [facility].   - RN notified the team that they received a call from the Health Department about a recent positive Hep A screen. They were encouraged to contact the ordering provider for further questions       Review of Systems   Constitutional: Negative for chills, diaphoresis and fever.   HENT: Negative for congestion, hearing loss, sore throat and tinnitus.    Eyes: Negative for blurred vision, double vision and photophobia.   Respiratory: Positive for cough (pt states,  "\"off and on\"). Negative for hemoptysis and sputum production.    Cardiovascular: Negative for chest pain, palpitations, orthopnea and leg swelling.   Gastrointestinal: Negative for abdominal pain, constipation, diarrhea, heartburn, nausea and vomiting.   Genitourinary: Positive for frequency (nocturia). Negative for dysuria, hematuria and urgency.   Musculoskeletal: Positive for back pain (positional; bed). Negative for joint pain, myalgias and neck pain.   Skin: Positive for itching (pt describes general itching with no focal point or tenderness). Negative for rash.   Neurological: Negative for dizziness, tremors, sensory change and headaches.   Endo/Heme/Allergies: Negative for polydipsia.   Psychiatric/Behavioral: The patient is nervous/anxious (Pt denies; but appears tense throughout exam).        Physical Exam       Vitals:    04/16/19 2353 04/17/19 0400 04/17/19 0728 04/17/19 0801   BP:  158/91 (!) 193/119 (!) 166/88   Pulse: 100 97 (!) 109 (!) 112   Resp:  16 16    Temp:  36.4 °C (97.5 °F) 36.7 °C (98 °F)    TempSrc:  Temporal Temporal    SpO2: 91% 93% 94%    Weight:       Height:         Body mass index is 29.33 kg/m².    Oxygen Therapy:  Pulse Oximetry: 94 %, O2 (LPM): 0, O2 Delivery: None (Room Air)    Physical Exam   Constitutional: He is oriented to person, place, and time and well-developed, well-nourished, and in no distress.   HENT:   Head: Normocephalic and atraumatic.   Mouth/Throat: No oropharyngeal exudate.   Eyes: Pupils are equal, round, and reactive to light. Conjunctivae and EOM are normal. No scleral icterus.   Neck: Normal range of motion. Neck supple. No thyromegaly present.   Cardiovascular: An irregular rhythm present. Tachycardia present.    Murmur (Loudest at the apex; consistent with mitral valve replacement) heard.  Pulses:       Radial pulses are 1+ on the right side, and 1+ on the left side.   Pulmonary/Chest: Effort normal. No respiratory distress. He has decreased breath sounds in " the right lower field and the left lower field. He has no wheezes.   Abdominal: Soft. Bowel sounds are hyperactive.   PD tube in place  Itching across his mid-epigastric region; pt describes general itching with no focal point or tenderness   Musculoskeletal: Normal range of motion. He exhibits no edema.   Neurological: He is alert and oriented to person, place, and time. No cranial nerve deficit.   Expressive aphasia; worse today with increased word finding difficulties and longer word recall times  No strength or sensation deficits  No facial droop noted on today's exam   Skin: Skin is warm and dry.   Psychiatric: Affect normal.     Recent Labs      04/15/19   0336  04/17/19 0217   WBC  8.5  8.9   RBC  4.19*  4.44*   HEMOGLOBIN  12.8*  13.4*   HEMATOCRIT  40.5*  41.7*   MCV  96.7  93.9   MCH  30.5  30.2   RDW  48.2  45.1   PLATELETCT  167  160*   MPV  11.4  12.4   NEUTSPOLYS  70.00  69.30   LYMPHOCYTES  15.40*  15.30*   MONOCYTES  9.80  9.80   EOSINOPHILS  3.70  4.90   BASOPHILS  0.60  0.40     Recent Labs      04/15/19   0336  04/16/19   0253  04/17/19   0217   SODIUM  139  139  141   POTASSIUM  4.6  4.2  4.5   CHLORIDE  104  100  104   CO2  21  24  22   GLUCOSE  239*  295*  177*   BUN  63*  73*  77*     Recent Labs      04/15/19   0336  04/16/19   0253  04/17/19   0217   ALBUMIN  3.5  3.7  3.7   TBILIRUBIN  0.3  0.4  0.6   ALKPHOSPHAT  95  107*  87   TOTPROTEIN  6.9  7.4  7.2   ALTSGPT  <5  <5  <5   ASTSGOT  10*  10*  10*   CREATININE  8.56*  8.84*  8.67*       Assessment/Plan   # Cerebral Vascular Accident  The patient presented with sudden onset expressive aphasia. Initially unable to express more than yes/no; now is able to form complete sentences, with some word finding and word use issues. The patient was noted to have a mild facial droop, but not seen on today's exam. He was found to be supra-theraputic on coumadin; INR 4.8 on admission; today INR 1.30. CT head showed no sign of hemorrhagic stroke; the  patient is unable to have an MRI due to cerebral coiling and incompatible pacemaker. Today the patient appears to have more word finding difficulty and longer word recall times. He continues to struggle with medication compliance and routine testing. The patient's repeat CT showed no signs of new stroke, with evidence of prior incident consistent with hx.   Plan:  - Accepted to inpatient rehab; now awaiting bed availability   - BP control: goal SBP<160; Hydralazine 10mg prn for SBP>160; amlodipine 10mg Qd  - PD fluid culture presulted no growth at 72 hours  - Continue Atorvastatin and insulin therapy  - Allowing GI soft diet; recommending 1:1 feeds    #ESRD  By hx; pts BUN/Cr = 63/8.56; GFR=6 on admit. The patient has an elevated troponin due to ESRD  - Continue PD QHS  - Nephrology to follow    #Diabetes Mellitus   A1C = 9.3; insulin dependent; home lantus 35 units QHS  - Switch from insulin sliding scale to lantus 10 units QHS; titrate up as needed; goal blood glucose 120-150  - The patient agreed to BID fingersticks after large meals to adjust med doses as necessary     #Murmur   #Secondary to Mitral valve replacement  The patient has remained sub therapeutic on warfarin since we held it at admission  - Begin weight based heparin drip to bridge to therapeutic  - Goal 2.5 - 3.5 due to mitral replacement      #Hepatitis A  The patient was HAV IgM assay positive during this admission; ordered by nephrology. The health department has contacted our services to assess the needs for treatment.  - Please contact the ordering provider for further follow up.

## 2019-04-17 NOTE — PROGRESS NOTES
[facility] Progress Note        [facility] disconnected aseptically at 1210. Pt stable, VSS, alert and oriented. Procedure explained to pt and pt verbalized understanding.     PD exit site clean and dry with dressing in place.     Effluent clear yellow with no fibrin noted.     24 hour UF: 1610mL (1551mL total UF + 60mL initial drain - 0mL last fill)      Report given to NANY shaffer.

## 2019-04-17 NOTE — PROGRESS NOTES
Lqw drain volume alarm. Patient was repositioned without success. PD cath was aseptically flushed with NS 10 cc. No resistant was felt instiling and withdrawing the NS solution. Patient has no abdominal discomfort. Drain bag is clear and free of fibrin. Bypassed drain 2 of 4. Fill 3 of 4. Dr Colunga was notified.

## 2019-04-17 NOTE — DISCHARGE PLANNING
Anticipated Discharge Disposition:   IRF    Action:   Notified Vandana at IRF to reconsider pt since the neuro consult is completed already. Vandana agreed to call CM back about determination.    Barriers to Discharge:  Pending IRF determination.    Plan:   Wait for Vandana from IRF to call back  Update MD   Update pt.      Addendum:  Received a call back from Vandana stating that they would need updated therapy notes and may be able to take pt if these are provided.

## 2019-04-17 NOTE — PROGRESS NOTES
[facility] tx x 12 hrs., per Dr. MARY Colunga, aseptically connected at 1755. Dressing changed. In-service, trouble shooting, and report given to BETTY Gudino RN.

## 2019-04-17 NOTE — DISCHARGE PLANNING
T[facility] continuing to follow; Neurology has signed off;  request made for updated therapy notes; spoke w/  Priya / CHAYA.

## 2019-04-17 NOTE — PROGRESS NOTES
Internal Medicine Interval Note  Note Author: Corby Ravi M.D.     Name Efrain Martines 1950   Age/Sex 69 y.o. male   MRN [id number]   Code Status FULL     After 5PM or if no immediate response to page, please call for cross-coverage  Attending/Team: Alin/Nicole See Patient List for primary contact information  Call [phone] to page    1st Call - Day Intern (R1):   Breonna 2nd Call - Day Sr. Resident (R2/R3):   Kd         Reason for interval visit  (Principal Problem)   Acute CVA/TIA with expressive aphasia       Interval Problem Daily Status Update  (24 hours, problem oriented, brief subjective history, new lab/imaging data pertinent to that problem)     Intermittently refuses finger stick checks. He will only allow check at breakfast and dinner.   Speech less articulate than yesterday.     Ongoing QHS PD. Nephro managing electrolytes.     Neurology: CT-Head w/o reviewed by neurology: no new stroke. Unable to to obtain MRI (due to coil and pacemaker).  Neurology signed off.     PT/OT: recommend acute rehab prior to d/c home. Updated notes pending     Physiatry: recommends Neuro consult prior to acceptance to rehab. Decision pending.     Pt has mechanical  St. Alex aortic valve, placed in .   Pharmacy to dose warfarin, now on weight based heparin bridging.   Following INR.     Review of Systems   Constitutional: Negative for chills, fever and malaise/fatigue.   HENT: Negative for congestion, sinus pain and sore throat.    Eyes: Positive for blurred vision. Negative for double vision and photophobia.        Due to previous CVA   Respiratory: Negative for cough, hemoptysis, shortness of breath and wheezing.    Cardiovascular: Positive for orthopnea. Negative for chest pain, palpitations and PND.        Chronic orthopnea   Gastrointestinal: Negative for abdominal pain, constipation, heartburn, nausea and vomiting.   Genitourinary: Negative.    Musculoskeletal: Positive  for back pain. Negative for joint pain.   Neurological: Positive for tremors. Negative for dizziness and weakness.        Baseline tremor   Psychiatric/Behavioral: Negative.       Disposition/Barriers to discharge:   IP pending therapy re-evaluation. Anticipate d/c to acute rehab.    Consultants/Specialty  Nephrology   PT/OT/SLP  Physiatry   Neurology      PCP: No primary care provider on file.    Quality Measures  Quality-Core Measures   Reviewed items::  Labs reviewed, Medications reviewed and Radiology images reviewed  Leach catheter::  No Leach  DVT prophylaxis pharmacological::  Warfarin (Coumadin) and Heparin      Physical Exam       Vitals:    04/16/19 2000 04/16/19 2013 04/16/19 2353 04/17/19 0400   BP: 157/87   158/91   Pulse: (!) 102 98 100 97   Resp: 17   16   Temp: 36.5 °C (97.7 °F)   36.4 °C (97.5 °F)   TempSrc: Temporal   Temporal   SpO2: 93% 90% 91% 93%   Weight:       Height:         Body mass index is 29.33 kg/m².    Oxygen Therapy:  Pulse Oximetry: 93 %, O2 (LPM): 0, O2 Delivery: None (Room Air)    Physical Exam   Constitutional: He is oriented to person, place, and time and well-developed, well-nourished, and in no distress. No distress.   HENT:   Head: Normocephalic and atraumatic.   Mouth/Throat: No oropharyngeal exudate.     Edentulous    Eyes: Pupils are equal, round, and reactive to light. Conjunctivae and EOM are normal. Right eye exhibits no discharge. Left eye exhibits no discharge. No scleral icterus.   Neck: Normal range of motion. Neck supple. No JVD present. No tracheal deviation present. No thyromegaly present.   Cardiovascular: Normal rate and regular rhythm.    Murmur heard.  1+ peripheral pulses through out   Abdominal: Soft. Bowel sounds are normal. He exhibits no distension. There is no tenderness. There is no rebound and no guarding.   Musculoskeletal: Normal range of motion. He exhibits no edema, tenderness or deformity.   Neurological: He is alert and oriented to person,  place, and time. No cranial nerve deficit. GCS score is 15.   No facial droop.   Aphasia: improving  Strength and sensation intact and symmetric.   A&O x3   Skin: Skin is warm and dry. No rash noted. He is not diaphoretic. No erythema. No pallor.   Psychiatric: Mood, memory, affect and judgment normal.   Nursing note and vitals reviewed.        Assessment/Plan     * CVA (cerebral vascular accident) (HCC)- (present on admission)   Assessment & Plan    Aphasia significantly improved.     Plan:  Resuming home antihypertensives   Atorvastatin    Seen by SLP, now off Cortrak, per pt's insistence   PT/OT: recommend acute rehab prior to d/c home  Neuro: images reviewed, no new stroke. Appreciate their input  Physiatry: pending acceptance to acute rehab       ESRD (end stage renal disease) ([facility])- (present on admission)   Assessment & Plan    Gets PD QHS per Nephrology.     Plan:  Nephrology following, appreciate their input: peritoenal fluid clx NGTD, got one dose of Ancef + gentamycin.     Chronic anticoagulation- (present on admission)   Assessment & Plan    For mechanical  St. Alex aortic valve, placed in 1999. On coumadin. INR 4.80 on admission. Held.   Neuroimaging neg for hemorrhage. Seen by neurology: resume warfarin.       Plan:  On Warfarin + heparin bridge Pharmacy to dosing and following  Target INR 2-3.          Diabetes (HCC)- (present on admission)   Assessment & Plan    A1C 9.3    Plan:  diabetic diet: intermittently refusing insuline and finger sticks  Glargine + SSI with hypoglycemic protocol     Hypertension- (present on admission)   Assessment & Plan        Plan:  Resumed Lisinopril     Hepatitis A- (present on admission)   Assessment & Plan    LFTs wnl.       Plan:  Monitor  Nephrology following       Elevated troponin- (present on admission)   Assessment & Plan    He has elevated troponin in the setting of ESRD and paced VR. Not concerning for ACS.     Plan:  Getting PD         High anion gap metabolic  acidosis- (present on admission)   Assessment & Plan    Improving with PD    Plan:  Nephology following will continue to get PD     Leukocytosis- (present on admission)   Assessment & Plan    RESOLVED       History of mechanical fall without injury- (present on admission)   Assessment & Plan    Fell on the day prior to admission and was found down the morning of admission.       Plan:  Fall/Seizure precautions  PT/OT: recommend acute rehab prior to d/c home         Hypothyroidism- (present on admission)   Assessment & Plan    Stable    Plan:  Continue home synthroid       Hyperlipidemia- (present on admission)   Assessment & Plan    Was on home atorvastatin 40.     Plan:  Continue Atorvastatin 40

## 2019-04-17 NOTE — PROGRESS NOTES
"BRIEF NEUROLOGY CONSULT UPDATE NOTE:    Repeat CT did not show any new stroke. The L frontal stroke is also visible on the CT done in Jan 2018 confirming it to be old infarct.      Possibly his old stroke symptom resurfacing after the mechanical fall due to fatigue. He has improved well. Regardless, he needs permanent full anticoagulation with Coumadin for his aortic valve. If mechanical, then target INR 2.5 to 3.5.     No further emergent neurological intervention needed.     Neurology signing off. Thank you for the consultation. Please feel free to call back w/ any further questions.    The above was discussed with Dr. Juan Ogden M.D.    Yessenia Mccullough MD  Acute Neurology Consultant  Tiger Text ID \"Fernando Mccullough\"    "

## 2019-04-17 NOTE — PROGRESS NOTES
Received report and assumed pt care.  A&O x4.  Bed alarm and treaded socks on.  Call light and personal belongings within reach.  Bed locked and at lowest position.  HUITRON.  VSS.  Peritoneal dialysis in use.

## 2019-04-17 NOTE — PROGRESS NOTES
[facility] Progress Note:    Came in this AM to troubleshoot low drain volume alarm on PD machine. Attempted repositioning and raising bed with no effect. Called [phone] number on machine and was given instructions to bypass first drain to continue treatment. Informed CRISTEL Guerrier RN and told him to call down to HD room if there were any more issues.

## 2019-04-17 NOTE — PROGRESS NOTES
Inpatient Anticoagulation Service Note    Date: 4/17/2019  Reason for Anticoagulation: Aortic Mechanical Valve Replacement              Hemoglobin Value: (!) 13.4  Hematocrit Value: (!) 41.7  Lab Platelet Value: (!) 160  Target INR: 2.0 to 3.0    INR from last 7 days     Date/Time INR Value    04/17/19 0217 (!)  1.3    04/16/19 0253 (!)  1.47    04/15/19 0336 (!)  3.22    04/14/19 1102 (!)  5.56    04/13/19 0805 (!)  4.8        Dose from last 7 days     Date/Time Dose (mg)    04/17/19 1100  6    04/16/19 0800  5    04/15/19 1300  2.5    04/14/19 1400  0        Average Dose (mg):  (Home Dose:  2.5 mg Tu/We and 5 mg all other days)  Significant Interactions: Statin, Thyroid Medications  Bridge Therapy: Yes (HWBP)       Comments: INR trending down, now at 1.3. Heparin drip started until INR is therapeutic again. Will give pt an increased warfarin dose of 6 mg today and then will check the INR tomorrow.    Education Material Provided?: No (chronic warfarin patient)  Pharmacist suggested discharge dosing: TBD, likely pt's home warfarin dosing of 2.5 mg Tue, Wed and 5 mg all other days     Kaleb Omalley, PharmD

## 2019-04-17 NOTE — PROGRESS NOTES
Received call from exchange service at 0210 re low drain volume alarm from CRISTEL Guerrier RN. Advised RN with regards to troubleshooting CCPD cycler and left contact information. Called back at 0300 to verify if there were any more issues with cycler, primary RN denies any other alarms and cycler is on going.

## 2019-04-17 NOTE — CARE PLAN
Problem: Safety  Goal: Will remain free from falls    Intervention: Assess risk factors for falls  Fall risk assessed (see flow sheet). Bed in low position.      Problem: Infection  Goal: Will remain free from infection    Intervention: Implement standard precautions and perform hand washing before and after patient contact    Hand hygiene and standard precautions implemented      Problem: Venous Thromboembolism (VTW)/Deep Vein Thrombosis (DVT) Prevention:  Goal: Patient will participate in Venous Thrombosis (VTE)/Deep Vein Thrombosis (DVT)Prevention Measures    Intervention: Ensure patient wears graduated elastic stockings (VIKY hose) and/or SCDs, if ordered, when in bed or chair (Remove at least once per shift for skin check)  SCDs refused by pt.   Education was provided regarding use and importance of the device. Patient continued refusal

## 2019-04-17 NOTE — PROGRESS NOTES
[facility] Nephrology Daily Progress Note    Date of Service  4/17/2019    Chief Complaint  69 y.o. male admitted 4/13/2019 with expressive aphasia.  Found by wife on floor unresponsive in pool of urine and feces with PD catheter uncapped.     Interval Problem Update  4/13/19 - Renal consult.  Ordered PD.  Sent PD fluid for cell count, gram stain, and culture because of possible contamination prior to admission.  4/14/19 - Feels fine.  Speech is now ~normal.   4/15/19 - Feels ok. CCPD is going well. Net UF is 200 mL.  4/16/19 - Pt says he feels fine. Denies any complaints/concerns. Peritoneal fluid negative. Started on dysphagia diet.  4/17/19 - No new overnight renal events. Repeat CT did not show any new stroke. Low drain volume alarm. Patient was repositioned without success. PD cath was aseptically flushed with NS 10 cc. No resistant was felt instiling and withdrawing the NS solution. Patient has no abdominal discomfort.    Review of Systems  Review of Systems   Constitutional: Negative.    HENT: Negative.    Eyes: Negative.    Respiratory: Negative.    Cardiovascular: Negative.    Gastrointestinal: Negative.    Genitourinary: Negative.    Musculoskeletal: Negative.    Skin: Negative.    Neurological: Negative.    Endo/Heme/Allergies: Negative.    Psychiatric/Behavioral: Negative.         Physical Exam  Temp:  [36.4 °C (97.5 °F)-36.8 °C (98.3 °F)] 36.7 °C (98 °F)  Pulse:  [] 112  Resp:  [16-18] 16  BP: (157-193)/() 166/88  SpO2:  [90 %-95 %] 94 %    Physical Exam   Constitutional: He appears well-developed and well-nourished.   HENT:   Head: Normocephalic and atraumatic.   Eyes: Pupils are equal, round, and reactive to light. Conjunctivae and EOM are normal.   Neck: Normal range of motion. Neck supple.   Cardiovascular: Normal rate and regular rhythm.    Pulmonary/Chest: Effort normal and breath sounds normal.   Abdominal: Soft. Bowel sounds are normal.   PD catheter in place. CDI.    Musculoskeletal: Normal range of motion.   Neurological: He is alert.   Skin: Skin is warm and dry.       Fluids    Intake/Output Summary (Last 24 hours) at 04/17/19 1030  Last data filed at 04/17/19 0600   Gross per 24 hour   Intake              840 ml   Output                0 ml   Net              840 ml       Laboratory  Recent Labs      04/15/19   0336  04/17/19 0217   WBC  8.5  8.9   RBC  4.19*  4.44*   HEMOGLOBIN  12.8*  13.4*   HEMATOCRIT  40.5*  41.7*   MCV  96.7  93.9   MCH  30.5  30.2   MCHC  31.6*  32.1*   RDW  48.2  45.1   PLATELETCT  167  160*   MPV  11.4  12.4     Recent Labs      04/15/19   0336  04/16/19   0253  04/17/19 0217   SODIUM  139  139  141   POTASSIUM  4.6  4.2  4.5   CHLORIDE  104  100  104   CO2  21  24  22   GLUCOSE  239*  295*  177*   BUN  63*  73*  77*   CREATININE  8.56*  8.84*  8.67*   CALCIUM  9.6  10.4  10.3     Recent Labs      04/15/19   0336  04/16/19   0253  04/17/19   0217   INR  3.22*  1.47*  1.30*               Assessment/Plan  No new Assessment & Plan notes have been filed under this hospital service since the last note was generated.  Service: Nephrology     Impression:   ESRD on CCPD   TIA/CVA   DM   HTN   Aortic valve replacement   Pacemaker    Plan:   No changes to CCPD regimen. Continue CCPD.

## 2019-04-17 NOTE — PROGRESS NOTES
Patient was willing to let me check his blood sugar this morning. He was told his bgl was 172 but refused to take a dose of insulin

## 2019-04-17 NOTE — DISCHARGE PLANNING
Follow up for Rehab. Would appreciate PT/OT updates. [place] text request to therapy . TCC remains monitoring.

## 2019-04-17 NOTE — THERAPY
"Occupational Therapy Treatment completed with focus on ADLs, ADL transfers, patient education and cognition.  Functional Status: Pt seen for OT tx today, pt continues to be limited by decreased cognition, expressive aphasia, word finding difficulties noted throughout the session, not engaging in conversation just providing yes/no answers, R sided inattention present during dynamic environment mobility, poor motor planning. Pt's spouse present during session, reports this is change in mentation since yesterday, did quick stroke screen which was negative, BP in standing w/ activities 139/58, denies any vision changes or HA, pt's spouse concerns relayed to RN post session and updated on OT finding's, RN to f/u with spouse further. Pt performed bed mobility with min a and min vc's for sequencing, ambulated short distance to BR with min a using FWW, poor safety awareness and difficulties recall 2-step direction therapist provided, max verbal and tactile cues required to complete toilet t/f, standing oral care with min a for locating objects and mod vc's for sequencing, returned back to bed with supervision.   Plan of Care: Will benefit from Occupational Therapy 4 times per week  Discharge Recommendations:  Equipment Will Continue to Assess for Equipment Needs. Post-acute therapy Recommend inpatient transitional care services for continued occupational therapy services.       See \"Rehab Therapy-Acute\" Patient Summary Report for complete documentation.   "

## 2019-04-18 NOTE — PROGRESS NOTES
Connected to PD cycler at 1805. Vs were stable. Dressing to his Pd exit site was changed. No redness or drainage. No complaint of pain. Report given to his Primary RN.

## 2019-04-18 NOTE — CARE PLAN
Problem: Safety  Goal: Will remain free from falls  Patient is alert and oriented x 3, able to use call light appropriately. Safety education provided, verbalized understanding.     Problem: Knowledge Deficit  Goal: Knowledge of disease process/condition, treatment plan, diagnostic tests, and medications will improve  Plan of care is discussed with patient. Has episodes of non compliance.

## 2019-04-18 NOTE — CARE PLAN
Problem: Nutritional:  Goal: Achieve adequate nutritional intake  Patient will consume >50% of meals   Outcome: PROGRESSING SLOWER THAN EXPECTED  Attempted interview, pt asleep and did not wake upon calling name. PO intake variable 0-75% last 3 days. Spoke with RN who obtained supplement order from MD. Trial Boost with meals. RD following.

## 2019-04-18 NOTE — THERAPY
"Physical Therapy Treatment completed.   Bed Mobility:  Supine to Sit: Modified Independent  Transfers: Sit to Stand: Contact Guard Assist (initially min A; with cueing, CGA from lower chair with armrests)  Gait: Level Of Assist: Minimal Assist with Front-Wheel Walker       Plan of Care: Will benefit from Physical Therapy 4 times per week  Discharge Recommendations: Equipment: Will Continue to Assess for Equipment Needs.see below    Pt progressing as expected given co-morbidities. He was able to demonstrate short, hallway ambulation with FWW with min A/CGA. He does best with external cueing with regards to commands (ie walk to door, to chair, come to EOB, etc) rather than sequence based commands. He has difficulty with dual tasking and when in more dynamic/distracting environment with regards to function. He does appear to wax/wane with re: cognition and speech/word finding issues as he is quite coherant and speaking in full sentance during this PT visit (as earlier in day only responding in simple 1 word answers). Note that word finding issues appear worse post exertional activity and would query vascular/perfusion component with re: current symptoms as well. He will benefit from continued acute PT while here and would currently recommend continued skilled PT/placement prior to medical dc to home given current objective findings, age, PLOF, environmental barriers, current co-morbidities, and current level of assist needed. Note that spouse appears to be able to assist upon eventual dc to home as well.     See \"Rehab Therapy-Acute\" Patient Summary Report for complete documentation.       "

## 2019-04-18 NOTE — PROGRESS NOTES
[facility] Nephrology Daily Progress Note    Date of Service  4/18/2019    Chief Complaint  69 y.o. male admitted 4/13/2019 with expressive aphasia.  Found by wife on floor unresponsive in pool of urine and feces with PD catheter uncapped.     Interval Problem Update  4/13/19 - Renal consult.  Ordered PD.  Sent PD fluid for cell count, gram stain, and culture because of possible contamination prior to admission.  4/14/19 - Feels fine.  Speech is now ~normal.   4/15/19 - Feels ok. CCPD is going well. Net UF is 200 mL.  4/16/19 - Pt says he feels fine. Denies any complaints/concerns. Peritoneal fluid negative. Started on dysphagia diet.  4/17/19 - No new overnight renal events. Repeat CT did not show any new stroke. Low drain volume alarm. Patient was repositioned without success. PD cath was aseptically flushed with NS 10 cc. No resistant was felt instiling and withdrawing the NS solution. Patient has no abdominal discomfort.  4/18/19 - No issues with CCPD. Net  mL. Pt denies complaints/concerns. Says he will be going to rehab.    Review of Systems  Review of Systems   Constitutional: Negative.    HENT: Negative.    Eyes: Negative.    Respiratory: Negative.    Cardiovascular: Negative.    Gastrointestinal: Negative.    Genitourinary: Negative.    Musculoskeletal: Negative.    Skin: Negative.    Neurological: Negative.    Endo/Heme/Allergies: Negative.    Psychiatric/Behavioral: Negative.         Physical Exam  Temp:  [36 °C (96.8 °F)-36.7 °C (98 °F)] 36 °C (96.8 °F)  Pulse:  [] 110  Resp:  [16-18] 16  BP: (120-153)/(62-89) 120/62  SpO2:  [93 %-98 %] 95 %    Physical Exam   Constitutional: He appears well-developed and well-nourished.   HENT:   Head: Normocephalic and atraumatic.   Eyes: Pupils are equal, round, and reactive to light. Conjunctivae and EOM are normal.   Neck: Normal range of motion. Neck supple.   Cardiovascular: Normal rate and regular rhythm.    Pulmonary/Chest: Effort normal and  breath sounds normal.   Abdominal: Soft. Bowel sounds are normal.   PD catheter in place. CDI.   Musculoskeletal: Normal range of motion.   Neurological: He is alert.   Skin: Skin is warm and dry.       Fluids    Intake/Output Summary (Last 24 hours) at 04/18/19 0951  Last data filed at 04/18/19 0600   Gross per 24 hour   Intake                0 ml   Output              950 ml   Net             -950 ml       Laboratory  Recent Labs      04/17/19 0217 04/17/19   1619   WBC  8.9   --    RBC  4.44*   --    HEMOGLOBIN  13.4*   --    HEMATOCRIT  41.7*   --    MCV  93.9   --    MCH  30.2   --    MCHC  32.1*   --    RDW  45.1   --    PLATELETCT  160*  145*   MPV  12.4   --      Recent Labs      04/16/19   0253  04/17/19 0217  04/18/19   0054   SODIUM  139  141  140   POTASSIUM  4.2  4.5  4.3   CHLORIDE  100  104  102   CO2  24  22  23   GLUCOSE  295*  177*  203*   BUN  73*  77*  74*   CREATININE  8.84*  8.67*  9.27*   CALCIUM  10.4  10.3  9.9     Recent Labs      04/17/19 0217  04/17/19   1619  04/18/19   0054  04/18/19   0648   APTT   --   28.2  42.8*  51.6*   INR  1.30*  1.41*  1.52*   --                Assessment/Plan  No new Assessment & Plan notes have been filed under this hospital service since the last note was generated.  Service: Nephrology     Impression:   ESRD on CCPD   TIA/CVA   DM   HTN   Aortic valve replacement   Pacemaker    Plan:   No changes to CCPD regimen. Continue CCPD.

## 2019-04-18 NOTE — PROGRESS NOTES
Monitor Summary: SR 78-, IA .16, QRS .08, QT .38 with a 1.1-1.3 SP & frequent PAC per strip from monitor room.

## 2019-04-18 NOTE — DISCHARGE SUMMARY
Internal Medicine Discharge Summary  Note Author: Corby Raiv M.D.       Name Efrain Martines 1950   Age/Sex 69 y.o. male   MRN [id number]         Admit Date:  2019       Discharge Date:   2019   Service:   [facility] Internal Medicine Purple Team  Attending Physician(s):   Dr. Chase      Senior Resident(s):   Dr. Lane --> Dr. Yi  Durga Resident(s):   Dr. Ravi  PCP: No primary care provider on file.      Primary Diagnosis:   CVA/TIA  Strokelike symptoms    Secondary Diagnoses:                Principal Problem:    CVA (cerebral vascular accident) (HCC) POA: Yes  Active Problems:    ESRD (end stage renal disease) (HCC) POA: Yes    Hypertension (Chronic) POA: Yes    Diabetes (HCC) POA: Yes    Chronic anticoagulation POA: Yes    Hyperlipidemia (Chronic) POA: Yes    Hypothyroidism (Chronic) POA: Yes    History of mechanical fall without injury POA: Yes    Leukocytosis POA: Yes    High anion gap metabolic acidosis POA: Yes    Elevated troponin POA: Yes    Hepatitis A POA: Yes  Resolved Problems:    * No resolved hospital problems. *      Hospital Summary (Brief Narrative):       Please see H&P for full HPI.  Briefly, Mr. Martines was admitted to the hospital for acute onset global aphasia.   Given his past history of CVA and chronic anticoagulation on warfarin, there was concern for hemorrhagic stroke.  He did not have any other focal neurologic deficits.  Noncontrast CT head was negative for bleed.  MRI was contraindicated given the patient's aneurysmal coiling and in situ permanent pacemaker (noncompatible with MRI).  He has supratherapeutic INR and his Coumadin was held.  He failed swallow evaluation and needed Cortrak insertion with tube feeds.  The morning following admission his aphasia had dramatically improved.  He received physical and occupational therapy and was assessed by neurology.  Repeat noncontrast CT head did not show hemorrhagic or ischemic  stroke.  His warfarin was resumed with heparin bridging.  His end-stage renal disease was management with nightly peritoneal dialysis.  His chronic medical problems were appropriately managed.  He intermittently refused fingerstick glucose checks and correctional dose insulin.  This made controlling his blood sugars a challenge.  He attained a INR of 2.27 and heparin drip was appropriately discontinued.  Patient was also assessed by PM&R and was accepted to renal rehab hospital for acute inpatient rehab.       Patient /Hospital Summary (Details -- Problem Oriented) :          ESRD (end stage renal disease) ([facility])   Assessment & Plan    Gets PD QHS per Nephrology.     Plan:  Nephrology following, appreciate their input: peritoenal fluid clx NGTD, got one dose of Ancef + gentamycin.   No leukocytosis, afebrile.  PD port site c/d/i  Peritoneal dialysis will continue rehab.     * CVA (cerebral vascular accident) ([facility])   Assessment & Plan    Aphasia improved since admit.    Plan:  On home antihypertensives   Atorvastatin    Seen by SLP, now off Cortrak, per pt's insistence   PT/OT: recommend acute rehab prior to d/c home  Neuro: images reviewed, no new stroke. Appreciate their input  Physiatry: Accepted to [facility] acute rehab   Chronic anticoagulation   Assessment & Plan    For mechanical  [place]. Alex aortic valve, placed in 1999. On coumadin. INR 4.80 on admission. Held.   Neuroimaging neg for hemorrhage. Seen by neurology: resume warfarin.   INR 2.27 today.  Of heparin drip    Plan:  Resume home dose warfarin   Target INR 2-3.      Diabetes ([facility])   Assessment & Plan    A1C 9.3  intermittently refusing insulin and finger sticks    Plan:  diabetic diet  Glargine + SSI with hypoglycemic protocol     Hypertension   Assessment & Plan    Stable    Plan:  Resumed Lisinopril and amlodipine     Hepatitis A   Assessment & Plan    LFTs wnl.     Plan:  Monitor  Nephrology following     Elevated troponin   Assessment & Plan    He has  elevated troponin in the setting of ESRD and paced VR. Not concerning for ACS.     Plan:  Getting PD   High anion gap metabolic acidosis   Assessment & Plan    Secondary to uremia in this ESRD patient.  Improving with PD    Plan:  Nephology following will continue to get PD   Leukocytosis   Assessment & Plan    RESOLVED   History of mechanical fall without injury   Assessment & Plan    Fell on the day prior to admission and was found down the morning of admission.       Plan:  Fall/Seizure precautions  PT/OT: recommend acute rehab prior to d/c home   Hypothyroidism   Assessment & Plan    Stable    Plan:  Continue home synthroid   Hyperlipidemia   Assessment & Plan    Was on home atorvastatin 40.     Plan:  Continue Atorvastatin 40        Consultants:     Neurology  PT/OT  SLP  Physiatry    Procedures:        Peritoneal dialysis nightly    Imaging/ Testing:      CT-HEAD W/O   Final Result      1.  No evidence of acute intracranial process.      2.  Cerebral atrophy as well as periventricular chronic small vessel ischemic change.      3.  Again seen metallic structure within the supraclinoid region.      4.  Small focus of encephalomalacia involving the left frontal lobe, unchanged.      DX-ABDOMEN FOR TUBE PLACEMENT   Final Result      Cortrak nasogastric tube position consistent with intragastric placement.      CT-CTA NECK WITH & W/O-POST PROCESSING   Final Result      Unremarkable CT angiogram of the neck. No high-grade stenosis, large vessel occlusion, aneurysm or dissection.      CT-CTA HEAD WITH & W/O-POST PROCESS   Final Result      1.  No large vessel occlusion, high-grade stenosis or aneurysm of the [place] of [place].   2.  Suprasellar aneurysm coiling.      CT-RENAL COLIC EVALUATION(A/P W/O)   Final Result         1. No acute abnormality in the abdomen or pelvis.   2. Absent left kidney. Unremarkable right kidney. No hydronephrosis.   3. Bibasilar atelectasis/scarring. No pleural effusions.      CT-CEREBRAL  PERFUSION ANALYSIS   Final Result      1.  Cerebral blood flow less than 30% likely representing completed infarct = 0 mL.      2.  T Max more than 6 seconds likely representing combination of completed infarct and ischemia = 0 mL.      3.  Mismatched volume likely representing ischemic brain/penumbra = 0 mL.      4.  Please note that the cerebral perfusion was performed on the limited brain tissue around the basal ganglia region. Infarct/ischemia outside the CT perfusion sections can be missed in this study.      CT-HEAD W/O   Final Result      1.  No CT evidence of acute infarct, hemorrhage or mass.   2.  Small to moderate global parenchymal atrophy and chronic small vessel ischemic changes.      DX-CHEST-PORTABLE (1 VIEW)   Final Result         1. Nonspecific bilateral interstitial and alveolar opacities, likely mild edema. No significant pleural effusions.   2. Mild cardiomegaly.            Discharge Medications:         Medication Reconciliation: Completed       Medication List      ASK your doctor about these medications      Instructions   artificial tears 1.4 % Soln   Place 1 Drop in both eyes 4 times a day as needed.  Dose:  1 Drop     atorvastatin 40 MG Tabs  Commonly known as:  LIPITOR   Take 40 mg by mouth every evening.  Dose:  40 mg     calcitRIOL 0.25 MCG Caps  Commonly known as:  ROCALTROL   Take 0.25-0.5 mcg by mouth See Admin Instructions. 0.50 mcg every Monday and Thursday morning 0.25 mcg all other mornings  Dose:  0.25-0.5 mcg     cyanocobalamin 500 MCG Tabs  Commonly known as:  VITAMIN B-12   Take 500 mcg by mouth every day.  Dose:  500 mcg     insulin glargine 100 UNIT/ML Soln  Commonly known as:  LANTUS   Inject 35 Units as instructed every evening.  Dose:  35 Units     lidocaine 5 % Ptch  Commonly known as:  LIDODERM   Apply 1 Patch to skin as directed every 24 hours. Neck and Lower back  Dose:  1 Patch     lisinopril 2.5 MG Tabs  Commonly known as:  PRINIVIL   Take 2.5 mg by mouth every 48  hours.  Dose:  2.5 mg     metoprolol SR 50 MG Tb24  Commonly known as:  TOPROL XL   Take 75 mg by mouth every morning.  Dose:  75 mg     pregabalin 75 MG Caps  Commonly known as:  LYRICA   Take 75 mg by mouth every morning.  Dose:  75 mg     sevelamer 800 MG Tabs  Commonly known as:  RENAGEL   Take 800 mg by mouth 3 times a day, with meals.  Dose:  800 mg     SYNTHROID 125 MCG Tabs  Generic drug:  levothyroxine   Take 125 mcg by mouth every morning.  Dose:  125 mcg     traZODone 50 MG Tabs  Commonly known as:  DESYREL   Take 50 mg by mouth every evening.  Dose:  50 mg     vitamin D (Ergocalciferol) 61220 units Caps capsule  Commonly known as:  DRISDOL   Take 50,000 Units by mouth every Monday.  Dose:  94012 Units     warfarin 5 MG Tabs  Commonly known as:  COUMADIN   Take 2.5-5 mg by mouth every evening. 2.5 mg on Tuesday and Wednesday  5 mg all other days  Dose:  2.5-5 mg             Efrain Martines   Home Medication Instructions UMESH:[id number]    Printed on:04/19/19 1980   Medication Information                      acetaminophen (TYLENOL) 325 MG Tab  Take 2 Tabs by mouth every 6 hours as needed.             amLODIPine (NORVASC) 10 MG Tab  Take 1 Tab by mouth every day.             artificial tears 1.4 % Solution  Place 1 Drop in both eyes 4 times a day as needed.             atorvastatin (LIPITOR) 40 MG Tab  Take 40 mg by mouth every evening.             calcitRIOL (ROCALTROL) 0.25 MCG Cap  Take 0.25-0.5 mcg by mouth See Admin Instructions. 0.50 mcg every Monday and Thursday morning  0.25 mcg all other mornings             cyanocobalamin (VITAMIN B-12) 500 MCG Tab  Take 500 mcg by mouth every day.             famotidine (PEPCID) 20 MG Tab  Take 1 Tab by mouth 2 Times a Day.             insulin glargine (LANTUS) 100 UNIT/ML Solution  Inject 35 Units as instructed every evening.             insulin lispro (HUMALOG) 100 UNIT/ML Solution  Inject 1-6 Units as instructed 3 times a day before meals.              levothyroxine (SYNTHROID) 125 MCG Tab  Take 125 mcg by mouth every morning.             lidocaine (LIDODERM) 5 % Patch  Apply 1 Patch to skin as directed every 24 hours. Neck and Lower back             lisinopril (PRINIVIL) 2.5 MG Tab  Take 2.5 mg by mouth every 48 hours.             menthol-methyl salicylate (BENGAY) Ointment  Apply 1 Drop to affected area(s) 3 times a day as needed.             metoprolol SR (TOPROL XL) 50 MG TABLET SR 24 HR  Take 75 mg by mouth every morning.             pregabalin (LYRICA) 75 MG Cap  Take 75 mg by mouth every morning.             sevelamer (RENAGEL) 800 MG Tab  Take 800 mg by mouth 3 times a day, with meals.             traZODone (DESYREL) 50 MG Tab  Take 50 mg by mouth every evening.             vitamin D, Ergocalciferol, (DRISDOL) 37975 units Cap capsule  Take 50,000 Units by mouth every Monday.             warfarin (COUMADIN) 5 MG Tab  Take 2.5-5 mg by mouth every evening. 2.5 mg on Tuesday and Wednesday   5 mg all other days                 Disposition:   To [facility] rehab    Diet: Diabetic    Activity: As tolerated    Instructions:        The patient was instructed to return to the ER in the event of worsening symptoms. I have counseled the patient on the importance of compliance and the patient has agreed to proceed with all medical recommendations and follow up plan indicated above.   The patient understands that all medications come with benefits and risks. Risks may include permanent injury or death and these risks can be minimized with close reassessment and monitoring.        [facility]  [facility] Villagran [facility]  Discharge summary faxed to primary care provider:  Deferred  Copy of discharge summary given to the patient: Deferred      Follow up appointment details :      No future appointments.  No follow-up provider specified.      Pending Studies:        None    Time spent on discharge day patient visit, preparing discharge  paperwork and arranging for patient follow up.    Summary of follow up issues:   [facility]ran to manage chronic issues    Discharge Time (Minutes) :    40  Hospital Course Type:  Inpatient Stay >2 midnights      Condition on Discharge medically stable, alert and ambulatory  _____________________________________________________________________    Interval history/exam for day of discharge:    Please see progress notes from 4/19/2019      Most Recent Labs:    Lab Results   Component Value Date/Time    WBC 8.9 04/17/2019 02:17 AM    RBC 4.44 (L) 04/17/2019 02:17 AM    HEMOGLOBIN 13.4 (L) 04/17/2019 02:17 AM    HEMATOCRIT 41.7 (L) 04/17/2019 02:17 AM    MCV 93.9 04/17/2019 02:17 AM    MCH 30.2 04/17/2019 02:17 AM    MCHC 32.1 (L) 04/17/2019 02:17 AM    MPV 12.4 04/17/2019 02:17 AM    NEUTSPOLYS 69.30 04/17/2019 02:17 AM    LYMPHOCYTES 15.30 (L) 04/17/2019 02:17 AM    MONOCYTES 9.80 04/17/2019 02:17 AM    EOSINOPHILS 4.90 04/17/2019 02:17 AM    EOSINOPHILS 7 04/13/2019 03:45 PM    BASOPHILS 0.40 04/17/2019 02:17 AM      Lab Results   Component Value Date/Time    SODIUM 141 04/17/2019 02:17 AM    POTASSIUM 4.5 04/17/2019 02:17 AM    CHLORIDE 104 04/17/2019 02:17 AM    CO2 22 04/17/2019 02:17 AM    GLUCOSE 177 (H) 04/17/2019 02:17 AM    BUN 77 (HH) 04/17/2019 02:17 AM    CREATININE 8.67 (HH) 04/17/2019 02:17 AM      Lab Results   Component Value Date/Time    ALTSGPT <5 04/17/2019 02:17 AM    ASTSGOT 10 (L) 04/17/2019 02:17 AM    ALKPHOSPHAT 87 04/17/2019 02:17 AM    TBILIRUBIN 0.6 04/17/2019 02:17 AM    ALBUMIN 3.7 04/17/2019 02:17 AM    GLOBULIN 3.5 04/17/2019 02:17 AM    PREALBUMIN 20.0 04/15/2019 03:36 AM    INR 1.41 (H) 04/17/2019 04:19 PM     Lab Results   Component Value Date/Time    PROTHROMBTM 17.3 (H) 04/17/2019 04:19 PM    INR 1.41 (H) 04/17/2019 04:19 PM

## 2019-04-18 NOTE — PROGRESS NOTES
[facility] net UF= 882ml. Effluent  Yellowish clear, no fibrin noted. PD cath. Intact. Report given to NANY Sanches. See  Flow sheet for details.

## 2019-04-18 NOTE — DISCHARGE PLANNING
Anticipated Discharge Disposition:  [facility] is following    Action:   Chart review    Barriers to Discharge:   Pt has hep gtt which is a barrier for [facility]    Plan:   Follow up with [facility] team   Follow up with [facility]

## 2019-04-18 NOTE — PROGRESS NOTES
Patient is Aox3, forgets the date/time. He has episodes of non compliance, refused for his blood sugar to be rechecked tonight. His peritoneal dialysis machine was beeping multiple times and patient was irritable with the RN constantly checking the connections. He took his night meds. He refused to be reposition or boosted up. He denies any pain or discomfort. Bed alarm in place, safety education provided. Patient refused SCDs. Will continue to monitor.

## 2019-04-18 NOTE — PROGRESS NOTES
Pt weighed and pharmacist verified the correct initial dosing for heparin.  Also verified that coumadin should be given one time with the heparin.

## 2019-04-18 NOTE — NON-PROVIDER
"       Internal Medicine Medical Student Note  Note Author: Lenin Lemus, Student    Name Efrain Martines 1950   Age/Sex 69 y.o. male   MRN [id number]   Code Status Full       Reason for interval visit  (Principal Problem)   CVA (cerebral vascular accident) (HCC)    Interval Problem Daily Status Update  (problem status, last 24 hours, new history, new data )   - OT: \"Recommend inpatient transitional care services for continued occupational therapy services. Will benefit from Occupational Therapy 4 times per week\"  - PT: \"Recommend continued skilled PT/placement prior to medical dc to home given current objective findings, age, PLOF, environmental barriers, current co-morbidities, and current level of assist needed.\"  - RN notes that the patient \"has episodes of non compliance, refused for his blood sugar to be rechecked. However, he took his night meds.\"  - CM awaiting updated therapy notes to begin transitional care to rehab; unsure if it is more appropriate to wait until the patient is therapeutic on heparin before transfer  - Improved BP control: 140s/80s    Review of Systems   Constitutional: Negative for chills, diaphoresis and fever.   HENT: Negative for congestion, ear pain, hearing loss and sore throat.    Eyes: Negative for blurred vision, photophobia and pain.   Respiratory: Negative for cough, sputum production, shortness of breath and wheezing.    Cardiovascular: Positive for chest pain (30 min after first real meal in 3 days). Negative for palpitations and orthopnea.   Gastrointestinal: Positive for abdominal pain (Mid-epigastric) and heartburn (suspected). Negative for blood in stool, constipation, diarrhea, nausea and vomiting.   Genitourinary: Negative for dysuria, frequency and urgency.   Musculoskeletal: Positive for back pain (~T6-T8) and falls (By hx, none since hospitalization).   Skin: Positive for itching.   Neurological: Positive for speech change (More disorganized; " "difficulty with word finding). Negative for dizziness, sensory change, weakness and headaches.   Endo/Heme/Allergies: Negative for polydipsia.   Psychiatric/Behavioral: The patient is nervous/anxious.         Physical Exam       Vitals:    04/17/19 1733 04/17/19 1800 04/17/19 2000 04/18/19 0400   BP:   147/81 145/89   Pulse:   (!) 102 98   Resp:   16 16   Temp:   36.5 °C (97.7 °F) 36.3 °C (97.4 °F)   TempSrc:   Temporal Temporal   SpO2: 98%  94% 93%   Weight:  88 kg (194 lb 0.1 oz)     Height:  1.803 m (5' 10.98\")       Body mass index is 27.07 kg/m². Weight: 88 kg (194 lb 0.1 oz)  Oxygen Therapy:  Pulse Oximetry: 93 %, O2 (LPM): 0, O2 Delivery: None (Room Air)    Physical Exam  Constitutional: He is oriented to person, place, and time and well-developed, well-nourished, and in no distress.   HENT:   Head: Normocephalic and atraumatic.   Mouth/Throat: No oropharyngeal exudate.   Eyes: Pupils are equal, round, and reactive to light. Conjunctivae and EOM are normal. No scleral icterus.   Neck: Normal range of motion. Neck supple. No thyromegaly present.   Cardiovascular: An irregular rhythm present. Tachycardia present.    Murmur (Loudest at the base; consistent with valve replacement) heard.  Pulses:       Radial pulses are 1+ on the right side, and 1+ on the left side.   Pulmonary/Chest: Effort normal. No respiratory distress. He has decreased breath sounds on the right > left. He has no wheezes.   Abdominal: Soft. Bowel sounds are hyperactive.   PD tube in place  Itching across his mid-epigastric region  Musculoskeletal: Normal range of motion. He exhibits no edema.   Neurological: He is alert and oriented to person, place, and time. No cranial nerve deficit.   Expressive aphasia; increased word finding difficulties and longer word recall times  No strength or sensation deficits  No facial droop noted on today's exam   Skin: Skin is warm and dry.   Psychiatric: Affect normal.       Assessment/Plan   # Cerebral " Vascular Accident  The patient presented with sudden onset expressive aphasia. Initially unable to express more than yes/no; now is able to form complete sentences, with some word finding and word use issues. The patient was noted to have a mild facial droop, but not seen on today's exam. He was found to be supra-theraputic on coumadin; INR 4.8 on admission; today INR 1.30. CT head showed no sign of hemorrhagic stroke; the patient is unable to have an MRI due to cerebral coiling and incompatible pacemaker. Today the patient appears to have more word finding difficulty and longer word recall times. He continues to struggle with medication compliance and routine testing. The patient's repeat CT showed no signs of new stroke, with evidence of prior incident consistent with hx.   Plan:  - Accepted to inpatient rehab; may be pending d/c heparin drip; current goal is INR 2-3  - BP control: goal SBP<160; Hydralazine 10mg prn for SBP>160; amlodipine 10mg Qd  - PD fluid culture presulted no growth at 72 hours  - Continue Atorvastatin and insulin therapy  - Allowing GI soft diet; recommending 1:1 feeds     #ESRD  By hx; pts BUN/Cr = 63/8.56; GFR=6 on admit. The patient has an elevated troponin due to ESRD  - Continue PD QHS  - Nephrology to follow     #Diabetes Mellitus   A1C = 9.3; insulin dependent; home lantus 35 units QHS  - Switch from insulin sliding scale to lantus 10 units QHS; titrate up as needed; goal blood glucose 120-150  - The patient agreed to BID fingersticks after large meals to adjust med doses as necessary      #Murmur   #Secondary to Aortic valve replacement  The patient has remained sub therapeutic on warfarin since we held it at admission; after being found supra-theraputic in ED  - Begin weight based heparin drip to bridge to therapeutic  - Goal 2.0 - 3.0 due to hx of aortic valve replacement       #Hepatitis A  The patient was HAV IgM assay positive during this admission; ordered by nephrology. The  health department has contacted our services to assess the needs for treatment.  - Please contact the ordering provider for further follow up.

## 2019-04-19 NOTE — PROGRESS NOTES
Inpatient Anticoagulation Service Note    Date: 4/19/2019  Reason for Anticoagulation: Aortic Mechanical Valve Replacement  , Stroke            Hemoglobin Value: (!) 13.4  Hematocrit Value: (!) 41.7  Lab Platelet Value: (!) 145  Target INR: 2.0 to 3.0    INR from last 7 days     Date/Time INR Value    04/19/19 0028 (!)  2.27    04/18/19 0054 (!)  1.52    04/17/19 1619 (!)  1.41    04/17/19 0217 (!)  1.3    04/16/19 0253 (!)  1.47    04/15/19 0336 (!)  3.22    04/14/19 1102 (!)  5.56    04/13/19 0805 (!)  4.8        Dose from last 7 days     Date/Time Dose (mg)    04/19/19 0807  2.5    04/18/19 1643  6    04/17/19 1100  6    04/16/19 0800  5    04/15/19 1300  2.5    04/14/19 1400  0        Average Dose (mg):  (Home Dose:  2.5 mg Tu/We and 5 mg all other days)  Significant Interactions: Statin, Thyroid Medications  Bridge Therapy: No  INR Value Greater than 2 Prior to Discontinuation of Parenteral Anticoagulation: Not Applicable    Reversal Agent Administered: Not Applicable  Comments: INR now within goal INR range of 2-3.  This occurred after two bolus doses of 6mg.  DDI remain constant.  No s/sx of bleeding.  Patient to discharge to rehab in PM.  Heparin bridge therapy stopped at INR >/= 2.  Will reduce previous home regimen due to admittance with elevated INR.      Plan:  Warfarin 2.5mg tonight.  INR in AM.  Education Material Provided?: No (chronic warfarin patient)  Pharmacist suggested discharge dosing: Begin reduced home regimen of Warfarin 2.5mg Monday/Wednesday/Friday and 5mg all other days.  Recommend follow up within 72hrs of discharge.     Loyd Macdonald

## 2019-04-19 NOTE — PROGRESS NOTES
Spoke with [facility] doctor regarding heparin drip.  New orders to stop heparin and switch over to only coumadin.  IV leaking, doctor ok with no IV due to increased bleeding risk and no need at this time.

## 2019-04-19 NOTE — DISCHARGE PLANNING
F/U with client at bedside to discuss IRF referral. Explained specifics of inpatient rehab, answered questions/concerns. Efrain is agreeable to admission. He is aware transport is scheduled at 10a today. He tells me his wife is on her way to [facility] and agreeable to [facility] providing update on transfer. Provided [facility] contact information for any additional questions.     Spoke by phone with spouse Ameena Martines. She is aware of pending admission, planning to come to [facility] this morning after pt admitted.

## 2019-04-19 NOTE — PROGRESS NOTES
Connected aseptically to [facility] cycler @ 1930 with all 2.5% solution PD cath. Intact, dressing changed. Report and in-service given to primary RN. See flow sheet for details.

## 2019-04-19 NOTE — DISCHARGE PLANNING
Rehab following. Transport to IRF canceled at this time due to change in neuro status. Will monitor for medical clearance.

## 2019-04-19 NOTE — PREADMISSION SCREENING NOTE
Pre-Admission Screening Form    Patient Information:   Name: Efrain Martines     MRN: [id number]       : 1950      Age: 69 y.o.   Gender: male      Race: White [7]       Marital Status:  [2]  Family Contact: Ameena Martines        Relationship: Spouse [17]  Home Phone: [phone]           Cell Phone:   Advanced Directives: None  Code Status:  FULL  Current Attending Provider: Niels Chase M.D.  Referring Physician: Dr. Ravi      Physiatrist Consult: Dr. Edwin Mari       Referral Date: 2019  Primary Payor Source:  MEDICARE  Secondary Payor Source:  [facility]    Medical Information:   Date of Admission to Acute Care [place]2019  Room Number: S143/00  Rehabilitation Diagnosis: 16 Debility (Non Cardiac, Non Pulmonary)  Immunization History   Administered Date(s) Administered   • Influenza Vaccine Adult HD 10/01/2017, 2018   • Pneumococcal Conjugate Vaccine (Prevnar/PCV-13) 2017     No Known Allergies  Past Medical History:   Diagnosis Date   • Cerebral aneurysm     w/clot into L kidney    • Cervical radiculopathy    • Chronic hepatitis C (HCC)    • Chronic kidney disease     stage 4   • CVA (cerebral vascular accident) (HCC) 1993    residual right sided weakness   • DM (diabetes mellitus) (HCC)     type 2   • HTN (hypertension)    • Hyperlipidemia    • Hypothyroidism    • Obstructive sleep apnea     cpap nightly    • Pacemaker    • Peripheral neuropathy      Past Surgical History:   Procedure Laterality Date   • AORTIC VALVE REPLACEMENT     • MITRAL VALVE REPLACEMENT     • NEPHRECTOMY LAPAROSCOPIC Left        History Leading to Admission, Conditions that Caused the Need for Rehab (CMS):     Corby Ravi M.D. Resident Attested [facility] Medicine  H&P Date of Service: 2019 12:28 PM      Attestation signed by Niels Chase M.D. at 2019 6:01 PM   [facility] ATTENDING ATTESTATION:       I personaly saw and  examined the patient and discussed the management with the resident. I reviewed the resident's note and agree with the documented findings and plan of care, except or in addition, as documented below. Please refer to the resident's note for complete information.                                                                 Additional comments:    Wife at bedside, assisted with history.  No clear evidence of LOC.  Pt seems able to understand relatively well, can follow simple commands too.  Cannot verbalise any meaningful words though.  No facial asymetry.  No other asymetric weakness or sensory abnormalities.  No neck stiffness.  Abdomen is soft and non tender, no signs of peritonitis. Clean PD catheter in place.  Hear sounds regular, mechanical click audible, PPM in place.  Lungs bilaterally clear.  Labs ok except for ESRD.  CT head unremarkable, cannot obtain MRI due to pacer and coiling of cerebral aneurysm in the past.  INR supra-therapeutic, but no evidence of bleeding.                                                      Family history: Unable to obtain due to aphasia.     CVA with expressive aphasia.  ESRD on PD.  Supratherapeutic INR.  Mechanical heart valve  DMII  HTN     Admit per stroke protocol. Not a TPA candidate due to unclear time window and pt on anticoagulation.  Would like an MRI, but cannot obtain that due to PPM.  Hold coumadin, no need for urgent reversal currently.  Hold off on ABX, send peritoneal fluid for analysis.  May need to consider EEG.  Will consider neuro eval if status does not improve.  Nephro consult for continuation of PD.  SLP/PT/OT.  Full code as per wife.  In-pt admit.        Niels Chase MD   Academic Hopsitalist          Expand All Collapse All    []Hide copied text          Internal Medicine Admitting History and Physical    Note Author: Corby Ravi M.D.         Name Efrain Martines 1950   Age/Sex 69 y.o. male   MRN [id number]   Code Status  FULL      After 5PM or if no immediate response to page, please call for cross-coverage  Attending/Team: Salvatore/Nicole See Patient List for primary contact information  Call [phone] to page    1st Call - Day Intern (R1):   Breonna 2nd Call - Day Sr. Resident (R2/R3):   Gama Ortega         Chief Complaint:   Altered level of consciousness     HPI:  Mr. Martines is a right-handed 69-year-old male last known normal about 6:30 PM on 4/12/19, with past medical history significant for ESRD on PD, insulin-dependent diabetes mellitus with peripheral neuropathy, hypertension, hyperlipidemia, PPM secondary to third-degree AV block (2007), AVR (on warfarin), cerebral aneurysm (s/p coiling 1990s), who was BIBA after being found on the ground with altered level of consciousness this morning.  History is largely from his wife who is at bedside. His wife reports that she found patient about 1 m away from the bed this morning upon returning from work.  He was nonverbal but clearly motioned to be helped.  He was incontinent of stool and urine.  It appears he had gotten up to walk to weigh himself after a session of PD.  His wife reports that he had a mechanical fall the day prior to this event.  She is unsure if he, hit his head, or lost consciousness.     In the ED, initial vitals were stable, temp was 96.3. He was still non-verbal. Neuroimaging was negative for acute hemorrhage, aneurysms, stenosis or ischemic changes. He was supra-therapeutic on coumadin INR 4.8, has a history of aneurysmal coiling, he was not a candidate for tPA. He has a PPM and aneurysmal coiling, MRI was unable to be obtained. He has elevated troponin in the setting of ESRD and paced VR. He was seen by nephrology.               EKG:   EKG Independent Review: Completed  V-paced VR: 93, no further analysis done.      Records reviewed and summarized in current documentation :  Yes  [employer] teaching service handout given to patient:  No             Assessment/Plan          * CVA (cerebral vascular accident) ([facility])- (present on admission)   Assessment & Plan     Last seen normal 6:30 PM on 4/12. Found down this morning.   Mild right lower face droop (per wife), motor aphasia. No other focal neurologic deficit. NIHSS 9.   Neuroimaging: nil for acute.      Plan:  Admit to neurology with cardiac monitoring  Permissive hypertension  Q 4HR   Atorvastatin    PT/OT  NPO, SLP pending   Consider physiatry consult       ESRD (end stage renal disease) ([facility])- (present on admission)   Assessment & Plan     Get PD QHS.   EDP notified on call nephrologist.      Plan:  Nephrology following, appreciate their input: peritoenal fluid clx pending, got one dose of Ancef + gentamycin.      Chronic anticoagulation- (present on admission)   Assessment & Plan     With coumadin for AVR. INR 4.80.      Neuroimaging neg for hemorrhage.         Plan:  Hold AC  SCDs for DVt ppx  No ASA for now         Diabetes ([facility])- (present on admission)   Assessment & Plan     A1C pending.      Plan:  NPO pending SLP eval --> diabetic diet  SSI with hypoglycemic protocol      Hypertension- (present on admission)   Assessment & Plan     Plan:     Holding home antihypertensives for permissive hypertension.       Elevated troponin- (present on admission)   Assessment & Plan     He has elevated troponin in the setting of ESRD and paced VR. Not concerning for ACS.      Plan:  He was seen by nephrology.             High anion gap metabolic acidosis- (present on admission)   Assessment & Plan     GAP 14 due to uremia.      Plan:  Nephology following will get PD      Leukocytosis- (present on admission)   Assessment & Plan     Stress induced.          History of mechanical fall without injury- (present on admission)   Assessment & Plan     Fell on the day prior to admission and was found down the morning of admission.         Plan:  Fall/Seizure precautions  PT/OT pending            Hypothyroidism-  (present on admission)   Assessment & Plan        Plan:  Continue home synthroid  TSH/R T4 pending      Hyperlipidemia- (present on admission)   Assessment & Plan     Was on home atorvastatin 40.      Plan:  Atorvastatin 80             Anticipated Hospital stay:  >2 midnights           Quality Measures  Quality-Core Measures   Reviewed items::  Labs reviewed, Medications reviewed and Radiology images reviewed  Leach catheter::  No Elach  DVT prophylaxis - mechanical:  SCDs     PCP: No primary care provider on file.         Cosigned by: Niels Chase M.D. at 4/13/2019  6:01 PM     Dyllan Hernandez M.D. Physician Signed Nephrology  Consults Date of Service: 4/13/2019  1:00 PM         []Hide copied text  []Hover for attribution information  DATE OF SERVICE:  04/13/2019     REASON FOR CONSULTATION:  End-stage renal failure.     HISTORY OF PRESENT ILLNESS:  Patient is a 69-year-old man with end-stage renal   failure, on chronic cycling peritoneal dialysis.  He was in his usual state   of health until this morning, his cough wife found him unresponsive on the   floor, lying in a pool of feces and urine with his peritoneal dialysis   catheter uncapped..  He was unresponsive, brought to the emergency room, and was   found to have an expressive aphasia.  Patient has an   aortic valve replacement, he is on Coumadin.  He is over anticoagulated.  I am   asked to see with respect to his dialysis needs.     PAST MEDICAL HISTORY:  1.  End-stage renal failure, on chronic maintenance hemodialysis.  2.  History of aortic valve replacement.  3.  DM  4.  HTN  5.  Pacer      IMPRESSION:  1.  End-stage renal failure, on CCPD.  2.  Stroke vs. TIA  3.  Aortic valve replacement  4.  DM  5   HTN  6.  Pacer     PLAN:  I asked the dialysis nurses to flush his peritoneal cantheter and send the fluid for   cell count, Gram stain and culture.  I gave the patient a dose of Ancef and   Gentamicin emperically.  He will have nightly peritoneal  dialysis.     Thank you very much for this consult.        ____________________________________     MD Edwin HOFF D.O. Physician Signed Physical Medicine & Rehab  Consults Date of Service: 4/15/2019 12:34 PM   Consult Orders:   IP CONSULT FOR PHYSIATRY [[id number]] ordered by Corby Ravi M.D. at 04/15/19 0758      Expand All Collapse All    []Hide copied text  Medical chart review completed.      Patient is a 69 y.o. year-old male with a past medical history significant for end-stage renal disease on hemodialysis, AVR on Coumadin, diabetes with peripheral neuropathy, hypertension, hyperlipidemia, cerebral aneurysm status post coiling in 1990s, pacemaker for high degree AV block admitted to [facility] on 4/13/2019  7:54 AM after ground-level fall with altered level of consciousness.  He was incontinent of stool and urine.     He was supratherapeutic with his INR on presentation     Head CT is negative for bleed  MRI is contraindicated     Patient was found to have dysphasia, status post core tract placement      ALLERGIES:  Patient has no known allergies.     PSYCHOSOCIAL HISTORY:  Living Site:  Home  Living With:  spouse  Caregiver's availability:  spouse  Number of stairs:  1  Previously using front wheel walker single-point cane     Possible CVA:  -Unable to evaluate with MRI secondary to pacemaker  -Consult neurology is pending  -We will need clarification on anticoagulation in the setting     Rehab: Impaired ADLs  - If officially diagnosed with CVA by neurology and recommendations of anticoagulation, secondary prophylaxis are met, patient would be a good candidate for acute rehabilitation.  -We will need clarification of disposition support           The patient presents  with cognitive deficits and functional deficits in mobility/self-cares/swallowing/speech, and Minimal  de-conditioning. Pre-morbidly, this patient lived in a single level home with One steps to  enter,with spouse  The patient was evaluated by acute care Physical Therapy, Occupational Therapy and Speech Language Pathology; currently requiring moderate assistance for mobility and moderate assistance for ADLs, also with ongoing cognitive and swallowing deficits.      The patient is   a Very Good candidate for an acute inpatient rehabilitation program with a coordinated program of care at an intensity and frequency not available at a lower level of care.      Note: This recommendation requires that patient has at least CGA/Minimal Assistance needs in at least two therapy disciplines.  If patient progresses to no longer need CGA/Yung with at least two therapy disciplines they may be more appropriate for Skilled nursing facility versus home with home health.       This recommendation is substantiated by the patient's current medical condition with intervention and assessment of medical issues requiring an acute level of care for patient's safety and maximum outcome. A coordinated program of care will be provided by an interdisciplinary team including physical therapy, occupational therapy, speech language pathology, hospitalist, physiatry, rehab nursing and rehab psychology. Rehab goals include improved cognition and swallowing, mobility, self-care management, strength and conditioning/endurance, pain management, bowel and bladder management, mood and affect, and safety with independent home management including caregiver training. Estimated length of stay is approximately 21 days. Rehab potential: Very Good. Disposition: to pre-morbid independent living setting with supportive care of patient's spouse. We will continue to follow with you in anticipation of discharge to acute inpatient rehabilitation when medically stable to do so at the discretion of the attending physician. Thank you for allowing us to participate in this patient's care. Please call with any questions regarding this recommendation.     Edwin  "CARLIE Rollins M.D. Physician Signed Neurology  Consults Date of Service: 2019 11:15 AM   Consult Orders:   IP Consult to Neurology [[id number]] ordered by Corby Ravi M.D. at 19 1113      Expand All Collapse All    []Hide copied text  []Hover for attribution information     NEUROLOGY INPATIENT STROKE HISTORY & PHYSICAL     IDENTIFYING DATA       A.  Patient: Efrain Martines, MRN: [id number], : 1950, Bed: S143/       B.  Data source:  Patient             Subjective accuracy/reliability of data source:  reliable.       C.  Medical records reviewed: prior inpatient notes, labs and images.       D.  Interviewer:  EMEKA Shook  Requesting Physician: Juan Ogden M.D.     Admission Date: 2019     Reason for Consultation: Expressive aphasia, facial droop, stroke?     HPI: Mr. Efrain Martines is a 69 y.o. right-handed man with PMHs as listed below including DM, ESRD on home dialysis and a distant history of seizures and two strokes surrounding the aortic valve replacement requiring chronic Coumadin (INR target 2.5-3.5?) admitted on 19 for falling and word finding difficulty. On 19 he received dialysis at home as usual. After it was done as he was getting out of the bed, he \"slipped\" out of the bed and fell to the ground. His wife heard the noise in the kitchen and came over and saw him on the ground. He denies lose consciousness or had any seizure like jerking. He recalls everything. No alleviating or exacerbating factors were identified.  No other associated symptoms noted. She could not help him get up. 911 was called and he was admitted as he showed difficulty word finding. In the ED, he was found to be dysarthric. INR was supratherapeutic to 4.8.  CBC showed some leukocytosis w/ left shift but no source of infection found and 2 days of empirical abx, it normalized. No fever reported. The patient reports his speech " improved gradually although not quite back to his baseline. He denies any headaches.     He denies taking any seizure medication for a long time since he has not had any. At baseline he reports he can ambulate w/o any support. He does not drive. He admits to baseline memory deficits.      Review of systems:  1. General: No weight change, fevers or chills, or nightsweats, +generalized fatigue.   2. Head: No headaches or dizziness or lightheadedness  3. Eyes: No vision changes, vision loss or pain  4. Respiratory: No shortness of breath, cough, sputum, or wheezing  5. Cardiac: No chest pain, palpitations, FELICIANO, or orthopnea  6. Gastrointestinal: no nausea, vomiting, no abdominal pain or change in bowel habits, no melena or hematochezia  7. Urinary: no dysuria, frequency, or hesitancy.  8. Peripheral vascular: No leg cramps  9. Neurological: per HPI  10. Musculoskeletal: no cramps or pain, no atrophy, no joints pain or stiffness.  11. Psychiatric: No anxiety or depression  12. Hematologic: no rash, no easy bleeding or bruising, no epistaxis            ASSESSMENT:  Review of the initial CT head showed several small lacunar old strokes in the bilateral cerebelli, L insula, L frontal periventricular white matter and R frontal cortex. Also seen is a small subacute to late acute strokes in the L frontal cortex which may explain his deficits. CTA head & neck showed no notable stenosis. EKG on my review showed regular paced rhythm.  I suspect a new recent stroke in the past 2-3 days due to cardioembolism. Seizure is less likely given his fully retained alertness per report.       RECOMMENDATIONS:  - If MRI cannot be done due to the pacemaker, repeat a CT head w/o contrast to see the evolution of the L frontal stroke.   - For anticoagulation, he has no choice but Coumadin INR 2.5-3.0.   - consult PT/OT/Speech/Nutrition  - F/u with neurology clinic in 2-4 wks.   - No further emergent neurological intervention needed.  "     Neurology signing off. Thank you for the consultation. Please feel free to call back w/ any further questions.     The above was discussed in details with the primary team including Dr. Juan Ogden M.D.     I have reviewed the images and labs results with the patient and answered all questions and explained in details the assessment and recommendation sections above. The patient expressed his understanding and agreement to the above.    Yessenia Mccullough MD  Acute Neurology Consultant  Tiger Text ID \"Fernando Mccullough\"    Co-morbidities: See above  Potential Risk - Complications: Cognitive Impairment, Deep Vein Thrombosis, Dysphagia, Incontinence, Pain, Paralysis, Perceptual Impairment, Pneumonia, Pressure Ulcer, Urinary Tract Infection and Infection  Level of Risk: High    Ongoing Medical Management Needed (Medical/Nursing Needs):   Patient Active Problem List    Diagnosis Date Noted   • CVA (cerebral vascular accident) ([facility]) 04/13/2019     Priority: High   • ESRD (end stage renal disease) ([facility]) 01/26/2018     Priority: High   • Chronic anticoagulation 04/13/2019     Priority: Medium   • Acute on chronic systolic heart failure ([facility]) 01/30/2018     Priority: Medium   • Hypertension 01/26/2018     Priority: Medium   • Diabetes ([facility]) 01/26/2018     Priority: Medium   • Hematuria 01/26/2018     Priority: Medium   • Hyperlipidemia 01/26/2018     Priority: Low   • Hypothyroidism 01/26/2018     Priority: Low   • Obstructive sleep apnea 01/26/2018     Priority: Low   • History of mechanical fall without injury 01/26/2018     Priority: Low   • H/O aortic valve replacement 01/26/2018     Priority: Low   • Hepatitis C 01/26/2018     Priority: Low   • Complete heart block (HCC) 01/26/2018     Priority: Low   • Obesity 01/26/2018     Priority: Low   • Hepatitis A 04/15/2019   • Leukocytosis 04/13/2019   • High anion gap metabolic acidosis 04/13/2019   • Elevated troponin 04/13/2019   • Renal osteodystrophy 01/26/2018 " "  • Anemia 01/26/2018     David Gipson R.N. Registered Nurse Signed   Progress Notes Date of Service: 4/19/2019  8:01 AM         []Hide copied text  []Hover for attribution information  Disconnected aseptically from CCPD cycler @ 0745 with total net UF= 1814ml, Effluent clear and no fibrin noted, PD cath. Intact. Report given to primary RN. See flow sheet for details.        Current Vital Signs:   Temperature: 36.3 °C (97.4 °F) Pulse: (!) 106 Respiration: 15 Blood Pressure : 106/58  Weight: 88 kg (194 lb 0.1 oz) Height: 180.3 cm (5' 10.98\")  Pulse Oximetry: 97 % O2 (LPM): 0      Completed Laboratory Reports:  Recent Labs      04/17/19   0217  04/17/19   0457  04/17/19   1619  04/18/19   0054  04/18/19   0538  04/19/19   0028  04/19/19   0540   WBC  8.9   --    --    --    --    --    --    HEMOGLOBIN  13.4*   --    --    --    --    --    --    HEMATOCRIT  41.7*   --    --    --    --    --    --    PLATELETCT  160*   --   145*   --    --    --    --    SODIUM  141   --    --   140   --   137   --    POTASSIUM  4.5   --    --   4.3   --   4.1   --    BUN  77*   --    --   74*   --   77*   --    CREATININE  8.67*   --    --   9.27*   --   9.48*   --    ALBUMIN  3.7   --    --   3.7   --   3.8   --    GLUCOSE  177*   --    --   203*   --   227*   --    POCGLUCOSE   --   172*   --    --   246*   --   226*   INR  1.30*   --   1.41*  1.52*   --   2.27*   --      Additional Labs: Not Applicable    Prior Living Situation:   Housing / Facility: 1 Story House  Steps Into Home: 1  Steps In Home: 0  Lives with - Patient's Self Care Capacity: Spouse  Equipment Owned: Front-Wheel Walker, 4-Wheel Walker, Single Point Cane    Prior Level of Function / Living Situation:   Physical Therapy: Prior Services: None  Housing / Facility: 1 Story House  Steps Into Home: 1  Steps In Home: 0  Bathroom Set up: Walk In Shower, Shower Chair  Equipment Owned: Front-Wheel Walker, 4-Wheel Walker, Single Point Cane  Lives with - Patient's Self " Care Capacity: Spouse  Bed Mobility: Independent  Transfer Status: Independent  Ambulation: Independent  Distance Ambulation (Feet):  (community)  Assistive Devices Used: Single Point Cane  Stairs: Independent  Current Level of Function:   Level Of Assist: Minimal Assist  Assistive Device: Front Wheel Walker  Distance (Feet): 90  Deviation: Decreased Base Of Support, Decreased Heel Strike, Decreased Toe Off, Other (Comment) (dec clearance; veering to R slighlty/somewhat R neglect?)  # of Stairs Climbed: 0  Weight Bearing Status: no restrictions  Skilled Intervention: Verbal Cuing, Tactile Cuing, Sequencing, Postural Facilitation, Compensatory Strategies  Comments: see balance  Supine to Sit: Modified Independent  Sit to Supine: Supervised  Scooting: Supervised  Rolling: Minimal Assist to Rt.  Skilled Intervention: Verbal Cuing, Compensatory Strategies  Comments: HOB elevated + rail to exit bed; returns without physical assist or cueing  Sit to Stand: Contact Guard Assist (initially min A; with cueing,CGA @ lower chair with armrests)  Bed, Chair, Wheelchair Transfer: Minimal Assist  Toilet Transfers: Moderate Assist  Transfer Method: Other (Comments) (walks to chair/EOB)  Skilled Intervention: Verbal Cuing, Tactile Cuing, Compensatory Strategies  Comments: initially unable to come to stand from lower chair; however with 2nd attempt able to identify barriers and sequences without direct cueing (ie cueing only for attempting new strategies)  Sitting in Chair: at least 5 mins  Sitting Edge of Bed: at least 5 mins  Standing: ~6 mins  Occupational Therapy:   Self Feeding: Independent  Grooming / Hygiene: Independent  Bathing: Independent  Dressing: Independent  Toileting: Independent  Medication Management: Requires Assist  Laundry: Requires Assist  Kitchen Mobility: Requires Assist  Finances: Requires Assist  Home Management: Requires Assist  Shopping: Requires Assist  Prior Level Of Mobility: Independent With Device in  "Home  Prior Services: None  Housing / Facility: [address] [place] [address]  Current Level of Function:   Eating: Not Tested (declined)  Bathing: Not Tested  Upper Body Dressing: Not Tested (declined)  Lower Body Dressing: Maximal Assist (initiated donning R sock and then stopped; declined further)  Toileting: Maximal Assist  Skilled Intervention: Verbal Cuing, Tactile Cuing, Sequencing, Facilitation  Comments: poor motor planning, sequencing. At times, hard to decipher if pt doesn't want to participate versus cognition limitations   Speech Language Pathology:   Assessment : Clinical swallow evaluation completed this date. Pt alert, wife at bedside. Pt presents with nonfluent expressive aphasia, attempting to speak but with significant word finding difficulty, perseverations. Pt able to state his name and answer that he is an a hospital given verbal forced choice. Oral [facility] exam concerning for oral apraxia as pt demonstrated difficulty coordinating movements and executing a volitional cough/throat clear. Pt otherwise able to follow 1-step directives. Pt edentulous, wet vocal quality noted at baseline. Pt presented with ice chips, NTL via tsp/cup, thins via tsp/cup, purees. Oral phase unremarkable. Pharyngeal swallow trigger was timely though hyolaryngeal excursion palpated as slow, reduced, sluggish. Wet/gurgly vocal quality occurred following intake of thins via cup. Delayed reflexive wet throat clearing noted with intake of purees and nectar. When asked if he felt any residue in his throat, pt responded \"yes, a little.\" Pt had difficulty executing a strong throat clear or cough. Pt does not appear safe for a PO diet at this time as he is presenting with s/sx of aspiration with conservative PO trials of modified textures. Recommend NPO with Cortrak for alternative means of nutrition/hydration. Pt may benefit from a diagnostic swallow evaluation prior to advancing to PO alimentation, given neurologic changes and potential for " "silent aspiration.   Problem List: Dysphagia, Aphasia  Diet / Liquid Recommendation: Nectar Thick Liquid, Dysphagia II  Comments: The patient was seen for dysphagia tx per MD request, as they have pulled his cortrak and would like pt to be re-evaluated as he does not like his current dysphagia diet.  RN reporting pt is not eating much due to disliking current diet.  PO trials of soft solids, crackers and thin liquids.  Pt declining trials of puree and nectars.  Pt had coughing of thins in approximately 25% of trials, which is concerning for aspiration or penetration.  With every episode of coughing, pt reported \"I was not taking my time\".  When taking very small, controlled sips, pt was able to drink thins without s/sx of aspiration.  He required moderate cueing for pacing.  Pt also ate crackers without s/sx of aspiration, however again he was given moderate cueing to reduce rate.  At this time, pt remains at risk for aspiration with an upgraded diet.  He was educated regarding risks of aspiration and recommendations for a modified diet, and reported \"I will take my chances.  I am doing pretty good.\"  If pt choosed to eat despite risk for aspiration, a dysphagia 3/thin liquid diet would be preferrable as pt is edentulous with dentures.  RN aware and notifying MD of pt's risk to eat despite risk for aspiration.  SLP continues to follow.    Rehabilitation Prognosis/Potential: Good  Estimated Length of Stay: 21 days    Nursing:   Orientation : Oriented x 4  Anuric - Peritoneal dialysis    Scope/Intensity of Services Recommended:  Physical Therapy: 1 hr / day  5 days / week. Therapeutic Interventions Required: Maximize Endurance, Mobility, Strength and Safety  Occupational Therapy: 1 hr / day 5 days / week. Therapeutic Interventions Required: Maximize Self Care, ADLs, IADLs and Energy Conservation  Speech & Language Pathology: 1 hr / day 5 days / week. Therapeutic Interventions Required: Maximize Cognition, Swallowing " and Safety  Rehabilitation Nursin/7. Therapeutic Interventions Required: Monitor Pain, Skin, Vital Signs, Intake and Output, Labs, Safety, Aspiration Risk, Family Training and Peritoneal dialysis nightly; Bowel regimen; DVT Prophylaxis; ADL's.  Rehabilitation Physician: 3 - 5 days / week. Therapeutic Interventions Required: Medical Management  Dietician: Consult. Therapeutic Interventions Required: Nutritional evaluation with recommendations to promote optimal health/healing.     Rehabilitation Goals and Plan (Expected frequency & duration of treatment in the IRF):   Return to the Community, Modified Independent Level of Care, Outpatient Support and Family Able to Provide  Assistance  Anticipated Date of Rehabilitation Admission: 2019  Patient/Family oriented IRF level of care/facility/plan: Yes  Patient/Family willing to participate in IRF care/facility/plan: Yes  Patient able to tolerate IRF level of care proposed: Yes  Patient has potential to benefit IRF level of care proposed: Yes  Comments: Not Applicable    Special Needs or Precautions - Medical Necessity:  Safety Concerns/Precautions:  Fall Risk / High Risk for Falls and Balance  Pain Management  Special Equipment: Peritoneal dialysis.     Diet:   DIET ORDERS (Through next 24h)    Start     Ordered    19 1201  Supplements  ONCE     Question:  Which Supplement  Answer:  BOOST GLUCOSE CONTROL    19 1206    19 1654  Diet Order Diabetic  ALL MEALS     Question Answer Comment   Diet: Diabetic    Texture/Fiber modifications: Dysphagia 3(Mechanical Soft)specify fluid consistency(question 6)    Consistency/Fluid modifications: Thin Liquids        19 1653          Anticipated Discharge Destination / Patient/Family Goal:  Destination: Home with Assistance Support System: Spouse and Family   Anticipated home health services: OT, PT, SLP and Nursing  Previously used HH service/ provider: Not Applicable  Anticipated DME Needs: To be  determined  Outpatient Services: To be determined  Alternative resources to address additional identified needs:   N/A  Pre-Screen Completed: 4/19/2019 8:44 AM Jennifer Easley R.N.

## 2019-04-19 NOTE — PROGRESS NOTES
Internal Medicine Interval Note  Note Author: Kapil Yi M.D.     Name Efrain Martines 1950   Age/Sex 69 y.o. male   MRN [id number]   Code Status FULL     After 5PM or if no immediate response to page, please call for cross-coverage  Attending/Team: Alin/Nicole See Patient List for primary contact information  Call [phone] to page    1st Call - Day Intern (R1):   Breonna 2nd Call - Day Sr. Resident (R2/R3):   Kd         Reason for interval visit  (Principal Problem)   Acute CVA/TIA with expressive aphasia       Interval Problem Daily Status Update  (24 hours, problem oriented, brief subjective history, new lab/imaging data pertinent to that problem)     - Some chest discomfort after breakfast this morning. Started on famotidine  - On heparin drip as a bridge to warfarin in the setting of mechanical aortic valve. INR 1.52 today.  -  Acceptance to rehab pending discontinuation of drip  - CCPD nightly    Review of Systems   Constitutional: Negative for chills, fever and malaise/fatigue.   HENT: Negative for congestion, sinus pain and sore throat.    Eyes: Positive for blurred vision. Negative for double vision and photophobia.        Due to previous CVA   Respiratory: Negative for cough, hemoptysis, shortness of breath and wheezing.    Cardiovascular: Positive for chest pain and orthopnea. Negative for palpitations and PND.        Chronic orthopnea   Gastrointestinal: Negative for abdominal pain, constipation, heartburn, nausea and vomiting.   Genitourinary: Negative.    Musculoskeletal: Positive for back pain. Negative for joint pain.   Neurological: Positive for tremors. Negative for dizziness and weakness.        Baseline tremor   Psychiatric/Behavioral: Negative.       Disposition/Barriers to discharge:   IP pending therapy re-evaluation. Anticipate d/c to acute rehab.    Consultants/Specialty  Nephrology   PT/OT/SLP  Physiatry   Neurology      PCP: No primary care provider  on file.    Quality Measures  Quality-Core Measures   Reviewed items::  Labs reviewed, Medications reviewed and Radiology images reviewed  Leach catheter::  No Leach  DVT prophylaxis pharmacological::  Warfarin (Coumadin) and Heparin      Physical Exam       Vitals:    04/17/19 2000 04/18/19 0400 04/18/19 0700 04/18/19 1200   BP: 147/81 145/89 120/62 118/61   Pulse: (!) 102 98 (!) 110 94   Resp: 16 16 16 15   Temp: 36.5 °C (97.7 °F) 36.3 °C (97.4 °F) 36 °C (96.8 °F)    TempSrc: Temporal Temporal Temporal    SpO2: 94% 93% 95% 92%   Weight:       Height:         Body mass index is 27.07 kg/m². Weight: 88 kg (194 lb 0.1 oz)  Oxygen Therapy:  Pulse Oximetry: 92 %, O2 (LPM): 0, O2 Delivery: None (Room Air)    Physical Exam   Constitutional: He is oriented to person, place, and time and well-developed, well-nourished, and in no distress. No distress.   HENT:   Head: Normocephalic and atraumatic.   Mouth/Throat: No oropharyngeal exudate.     Edentulous    Eyes: Pupils are equal, round, and reactive to light. Conjunctivae and EOM are normal. Right eye exhibits no discharge. Left eye exhibits no discharge. No scleral icterus.   Neck: Normal range of motion. Neck supple. No JVD present. No tracheal deviation present. No thyromegaly present.   Cardiovascular: Normal rate and regular rhythm.    Murmur heard.  1+ peripheral pulses through out   Abdominal: Soft. Bowel sounds are normal. He exhibits no distension. There is no tenderness. There is no rebound and no guarding.   Musculoskeletal: Normal range of motion. He exhibits no edema, tenderness or deformity.   Neurological: He is alert and oriented to person, place, and time. No cranial nerve deficit. GCS score is 15.   No facial droop.   Aphasia: improving  Strength and sensation intact and symmetric.   A&O x3   Skin: Skin is warm and dry. No rash noted. He is not diaphoretic. No erythema. No pallor.   Psychiatric: Mood, memory, affect and judgment normal.   Nursing note and  vitals reviewed.        Assessment/Plan     * CVA (cerebral vascular accident) (HCC)- (present on admission)   Assessment & Plan    Aphasia improved since admit    Plan:  On home antihypertensives   Atorvastatin    Seen by SLP, now off Cortrak, per pt's insistence   PT/OT: recommend acute rehab prior to d/c home  Neuro: images reviewed, no new stroke. Appreciate their input  Physiatry: pending acceptance to acute rehab, heparin drip a barrier for discharge       ESRD (end stage renal disease) (HCC)- (present on admission)   Assessment & Plan    Gets PD QHS per Nephrology.     Plan:  Nephrology following, appreciate their input: peritoenal fluid clx NGTD, got one dose of Ancef + gentamycin.     Chronic anticoagulation- (present on admission)   Assessment & Plan    For mechanical  St. Alex aortic valve, placed in 1999. On coumadin. INR 4.80 on admission. Held.   Neuroimaging neg for hemorrhage. Seen by neurology: resume warfarin.       Plan:  On Warfarin + heparin bridge Pharmacy dosing and following  Target INR 2-3.          Diabetes (HCC)- (present on admission)   Assessment & Plan    A1C 9.3    Plan:  diabetic diet: intermittently refusing insulin and finger sticks  Glargine + SSI with hypoglycemic protocol     Hypertension- (present on admission)   Assessment & Plan        Plan:  Resumed Lisinopril and amlodipine     Hepatitis A- (present on admission)   Assessment & Plan    LFTs wnl.       Plan:  Monitor  Nephrology following       Elevated troponin- (present on admission)   Assessment & Plan    He has elevated troponin in the setting of ESRD and paced VR. Not concerning for ACS.     Plan:  Getting PD         High anion gap metabolic acidosis- (present on admission)   Assessment & Plan    Improving with PD    Plan:  Nephology following will continue to get PD     Leukocytosis- (present on admission)   Assessment & Plan    RESOLVED       History of mechanical fall without injury- (present on admission)    Assessment & Plan    Fell on the day prior to admission and was found down the morning of admission.       Plan:  Fall/Seizure precautions  PT/OT: recommend acute rehab prior to d/c home         Hypothyroidism- (present on admission)   Assessment & Plan    Stable    Plan:  Continue home synthroid       Hyperlipidemia- (present on admission)   Assessment & Plan    Was on home atorvastatin 40.     Plan:  Continue Atorvastatin 40

## 2019-04-19 NOTE — DISCHARGE PLANNING
Anticipated Discharge Disposition:   IRF    Action:   therapheutic INR and off Heparin. [facility] team Dr. Ibrahim said pt is medically clear for transfer. Dilan from IRF accepted pt.  at 10am. CN aware.    Discharge planning discussed with pt and wife by CHAYA Duke.     LVM for wife re: discharge  Cobra signed by pt.    Barriers to Discharge:   none    Plan:   Dc at 10am.

## 2019-04-19 NOTE — PROGRESS NOTES
[facility] Nephrology Daily Progress Note    Date of Service  4/19/2019    Chief Complaint  69 y.o. male admitted 4/13/2019 with expressive aphasia.  Found by wife on floor unresponsive in pool of urine and feces with PD catheter uncapped.     Interval Problem Update  4/13/19 - Renal consult.  Ordered PD.  Sent PD fluid for cell count, gram stain, and culture because of possible contamination prior to admission.  4/14/19 - Feels fine.  Speech is now ~normal.   4/15/19 - Feels ok. CCPD is going well. Net UF is 200 mL.  4/16/19 - Pt says he feels fine. Denies any complaints/concerns. Peritoneal fluid negative. Started on dysphagia diet.  4/17/19 - No new overnight renal events. Repeat CT did not show any new stroke. Low drain volume alarm. Patient was repositioned without success. PD cath was aseptically flushed with NS 10 cc. No resistant was felt instiling and withdrawing the NS solution. Patient has no abdominal discomfort.  4/18/19 - No issues with CCPD. Net  mL. Pt denies complaints/concerns. Says he will be going to rehab.  4/19/19 - Disconnected from CCPD this AM 0745, Net UF = 1814, pt is globally aphasic with worsening L sided facial droop, was supposed to tx to rehab, STAT head CT pending     Review of Systems  Review of Systems   Unable to perform ROS: Patient nonverbal        Physical Exam  Temp:  [36.3 °C (97.3 °F)-36.4 °C (97.5 °F)] 36.3 °C (97.4 °F)  Pulse:  [] 106  Resp:  [15-16] 15  BP: ()/(56-78) 106/58  SpO2:  [92 %-98 %] 97 %    Physical Exam   Constitutional: He appears well-developed and well-nourished.   HENT:   Head: Normocephalic and atraumatic.   Eyes: Pupils are equal, round, and reactive to light.   Neck: Normal range of motion. Neck supple.   Cardiovascular: Normal rate and regular rhythm.    Mild tachycardia   Pulmonary/Chest: Effort normal and breath sounds normal.   Abdominal: Soft. Bowel sounds are normal.   PD catheter in place. CDI.   Musculoskeletal: Normal  range of motion.   Neurological:   Aphasic/L facial droop    Skin: Skin is warm and dry.       Fluids    Intake/Output Summary (Last 24 hours) at 04/19/19 1027  Last data filed at 04/19/19 0815   Gross per 24 hour   Intake              100 ml   Output             2805 ml   Net            -2705 ml       Laboratory  Recent Labs      04/17/19   0217  04/17/19   1619   WBC  8.9   --    RBC  4.44*   --    HEMOGLOBIN  13.4*   --    HEMATOCRIT  41.7*   --    MCV  93.9   --    MCH  30.2   --    MCHC  32.1*   --    RDW  45.1   --    PLATELETCT  160*  145*   MPV  12.4   --      Recent Labs      04/17/19   0217  04/18/19   0054  04/19/19   0028   SODIUM  141  140  137   POTASSIUM  4.5  4.3  4.1   CHLORIDE  104  102  101   CO2  22  23  23   GLUCOSE  177*  203*  227*   BUN  77*  74*  77*   CREATININE  8.67*  9.27*  9.48*   CALCIUM  10.3  9.9  10.1     Recent Labs      04/17/19   1619  04/18/19   0054   04/18/19   1633  04/19/19   0028  04/19/19   0635   APTT  28.2  42.8*   < >  54.0*  90.8*  67.8*   INR  1.41*  1.52*   --    --   2.27*   --     < > = values in this interval not displayed.               Assessment/Plan  No new Assessment & Plan notes have been filed under this hospital service since the last note was generated.  Service: Nephrology     Impression:   ESRD on CCPD   TIA/CVA   DM   HTN   Aortic valve replacement   Pacemaker    Plan:   No changes to CCPD regimen. Continue CCPD. STAT head CT now.

## 2019-04-19 NOTE — PROGRESS NOTES
Doctor ordered STAT head CT w/o contrast for neuro changes.  Pt not responding to questions or commands.  Left sided facial droop and tongue jutting to the right side of mouth noted.  Transport arrived to take pt in hospital bed.  Doctor's cell # given to CT to report preliminary results.

## 2019-04-19 NOTE — DISCHARGE PLANNING
Rehab transport rescheduled for 1p today due to CT head. CHAYA Powers aware. Following for CT results.

## 2019-04-19 NOTE — PROGRESS NOTES
Disconnected aseptically from CCPD cycler @ 0745 with total net UF= 1814ml, Effluent clear and no fibrin noted, PD cath. Intact. Report given to primary RN. See flow sheet for details.

## 2019-04-19 NOTE — DISCHARGE PLANNING
note:  Delay in discharge due to CT scan. If negative then pt can go to IRF. Will follow up with RN and update IRF.     Addendum:  Per Dr. Ibrahim from [facility]-pt is not clear for transfer to IRF due to pt being too weak and confused. Renal function is not up to PAR. [facility] will send consult to nephrology. Dilan and Jennifer Easlye notified at IRF.

## 2019-04-19 NOTE — CARE PLAN
Problem: Mobility  Goal: Risk for activity intolerance will decrease  Outcome: PROGRESSING SLOWER THAN EXPECTED  Unsteady gait.  Refuses to use FWW at times.

## 2019-04-19 NOTE — PROGRESS NOTES
Received report and assumed pt care.  A&O x4.  Bed alarm and treaded socks on.  Call light and personal belongings within reach.  Bed locked and at lowest position.  HUITRON.  VSS.  Peritoneal dialysis and heparin drip in use.

## 2019-04-19 NOTE — PROGRESS NOTES
"NEUROLOGY CONSULT PROGRESS NOTE:    Requesting Physician: Niels Chase M.D.  Admission Date: 4/13/2019    Reason for Consult: R hemiparesis and aphasia.     Subjective:  I was paged by the resident, Breonna at 11:21 am that the patient is having acute R hemiparesis. Last well known time was 7:30 am. On my exam, pt is mute and very lethargic.     Physical Exam:  /54   Pulse (!) 110   Temp 36.3 °C (97.4 °F) (Temporal)   Resp 15   Ht 1.803 m (5' 10.98\")   Wt 88 kg (194 lb 0.1 oz)   SpO2 97%   BMI 27.07 kg/m²     1. GEN: very lethargic.   2. EYES: Pupils 3mm->2mm bilaterally to light direct & consensual. No RADP, non-icteric sclerae, moist conjunctivae; no lid-lag.  3. NECK: Trachea midline, supple.   4. EXT: pedal pulses 2+ bilaterally, no digital cyanosis.   5. PSYCH: awakable but mute  6. Neurological Examination:  R lower face droop, unable to follow commands, drift in the R UE and bilateral LEs.     Labs: personally reviewed    Recent Labs      04/17/19   0217  04/18/19   0054  04/19/19   0028   SODIUM  141  140  137   POTASSIUM  4.5  4.3  4.1   CHLORIDE  104  102  101   CO2  22  23  23   GLUCOSE  177*  203*  227*   BUN  77*  74*  77*       Radiology/Reports: independently reviewed by me (see below).    ASSESSMENT:  The patient appears very lethargic and generally fatigued and confused. INR 2.7. BUN >70. Repeat CTA head showed no occlusive thrombus intracranially. CT perfusion showed only a spurious small area of decreased flow in the R posterior region which does not explain his R hemiparesis.      Any new infarct is unlikely. Rather infectious or metabolic especially urea may be the cause of his generalize malaise causing his old stroke symptoms to recur.     Recommendations:   - R/o any infectious process.  - See if BUN can be improved.   - anticoagulation per cardiology.  - dispo per primary service.  - No further emergent neurological intervention needed.     Neurology signing off. " "Thank you for the consultation. Please feel free to call back w/ any further questions.    The above was discussed in details with the primary team including Dr. Niels Chase M.D.    I have spent 25 minutes total more than half of which was spent reviewing the images and lab results with the wife present, and answering all questions and explaining in details the assessment and recommendation sections above as well as coordinating the care with the primary team. The wife expressed her understanding and agreement to the above.    Yessenia Mccullough MD  Acute Neurology Consultant  Tiger Text ID \"Fernando Mccullough\"    "

## 2019-04-19 NOTE — NON-PROVIDER
Internal Medicine Medical Student Note  Note Author: Lenin Lemus, Student    Name Efrain Martines 1950   Age/Sex 69 y.o. male   MRN [id number]   Code Status FULL       Reason for interval visit  (Principal Problem)   CVA (cerebral vascular accident) (HCC)    Interval Problem Daily Status Update  (problem status, last 24 hours, new history, new data )   - INR 2.27 this am; d/c heparin drip  - Pt clarified he has had chronic chest pain for the past month with no aggravating or alleviating factors.     - Reported chest pain was mostly noted after completing breakfast this am  - Pt was observed drinking boost this am with a strong but delayed cough ~10-15sec after swallow   - EMR recorded decreased BP overnight: 97/78  - The patient was initially feeling well this morning. He still exhibited word finding difficulty, but made strong attempts to communicate with staff, RNs, and other providers. At the end of his nightly PD he began noting increased dizziness; BP was re-checked at that time and found to be 106/58.   - The patient experienced similar findings at admission with confusion, dizziness, and weakness after completing PD  - On repeat exam the patient was found to be unable to speak with minor facial droop, and right-sided weakness.  - Stat CT head was ordered: initial review was negative for acute hemorraghic stroke     Review of Systems   Constitutional: Negative for chills and fever.   HENT: Negative for congestion, ear pain, hearing loss and sore throat.    Eyes: Positive for blurred vision (Chronic since prior stroke). Negative for photophobia and pain.   Respiratory: Positive for cough (10-15 sec after swallow). Negative for sputum production, shortness of breath and wheezing.    Cardiovascular: Positive for chest pain (After breakfast) and palpitations. Negative for orthopnea and leg swelling.   Gastrointestinal: Positive for heartburn. Negative for abdominal pain, constipation,  diarrhea, nausea and vomiting.   Genitourinary: Negative for dysuria, frequency and urgency.   Musculoskeletal: Positive for back pain and falls (By hx).   Skin: Positive for itching.   Neurological: Positive for dizziness, sensory change, speech change, focal weakness (New onset after PD) and weakness. Negative for headaches.   Endo/Heme/Allergies: Negative for polydipsia. Does not bruise/bleed easily.   Psychiatric/Behavioral: The patient is nervous/anxious.        Physical Exam       Vitals:    04/18/19 2000 04/19/19 0400 04/19/19 0700 04/19/19 1034   BP: 113/56 (!) 97/78 106/58 121/54   Pulse: (!) 102 (!) 101 (!) 106 (!) 110   Resp: 16 16 15    Temp: 36.3 °C (97.3 °F) 36.4 °C (97.5 °F) 36.3 °C (97.4 °F)    TempSrc: Temporal Temporal Temporal    SpO2: 92% 98% 97%    Weight:       Height:         Body mass index is 27.07 kg/m².    Oxygen Therapy:  Pulse Oximetry: 97 %, O2 (LPM): 0, O2 Delivery: None (Room Air)    Physical Exam   Constitutional:   On initial exam the patient was responding to commands, but on repeat exam the patient was alert but unable to communicate verbally, with little non-verbal communication.    HENT:   Head: Normocephalic and atraumatic.   Mouth/Throat: No oropharyngeal exudate.   Right sided facial droop present on today's exam   Eyes: Pupils are equal, round, and reactive to light. Conjunctivae are normal.   On initial exam the patient was responding to commands with EOM intact; on repeat exam the patient was unable to follow commands to assess for changes   Neck: Normal range of motion. No thyromegaly present.   Cardiovascular: An irregular rhythm present. Tachycardia present.    Murmur (Loudest at the base; consistent with aortic valve replacement) heard.  Pulmonary/Chest: No accessory muscle usage. No respiratory distress. He has decreased breath sounds in the right lower field and the left lower field.   Abdominal: Soft. He exhibits no distension. There is tenderness. There is  guarding.   Musculoskeletal: He exhibits no edema.   See neuro exam   Neurological:   Alert; oriented x0  Pupils are equal and responsive to light; no asymmetry  Unable to assess CN 3, 4, 5, 6, 8, 11  The patient had minimal facial droop R>L  Pt showed equal palate elevation unable to assess uvula deviation  The patient had right tongue deviation   The patient was unable to keep his right arm extended above his head but still had some movement against gravity  Similarly the patient was unable to have sustained lifting of his right leg against gravity  Normal strength on the left  Unable to assess sensation, gate, balance, cerebellar function   Skin: Skin is warm and dry.   Bleeding from IV site in the left antecubital site   Psychiatric:   Flat affect     CT-HEAD W/O   Final Result      No acute intracranial abnormality      CT-HEAD W/O   Final Result      1.  No evidence of acute intracranial process.      2.  Cerebral atrophy as well as periventricular chronic small vessel ischemic change.      3.  Again seen metallic structure within the supraclinoid region.      4.  Small focus of encephalomalacia involving the left frontal lobe, unchanged.   CT-CEREBRAL PERFUSION ANALYSIS   Final Result      1.  Cerebral blood flow less than 30% likely representing completed infarct = 0 mL.      2.  T Max more than 6 seconds likely representing combination of completed infarct and ischemia = 0 mL.      3.  Mismatched volume likely representing ischemic brain/penumbra = 0 mL.      4.  Please note that the cerebral perfusion was performed on the limited brain tissue around the basal ganglia region. Infarct/ischemia outside the CT perfusion sections can be missed in this study.      CT-HEAD W/O   Final Result      1.  No CT evidence of acute infarct, hemorrhage or mass.   2.  Small to moderate global parenchymal atrophy and chronic small vessel ischemic changes.   CT-CTA HEAD WITH & W/O-POST PROCESS    (Results Pending)    CT-CEREBRAL PERFUSION ANALYSIS    (Results Pending)         Assessment/Plan   # Cerebral Vascular Accident  The patient presented with sudden onset expressive aphasia. Initially unable to express more than yes/no; now is able to form complete sentences, with some word finding and word use issues. The patient was noted to have a mild facial droop, but not seen on today's exam. He was found to be supra-theraputic on coumadin; INR 4.8 on admission; today INR 1.30. CT head showed no sign of hemorrhagic stroke; the patient is unable to have an MRI due to cerebral coiling and incompatible pacemaker.     Today the patient was initially feeling well this morning. He still exhibited word finding difficulty, but made strong attempts to communicate with staff, RNs, and other providers. At the end of his nightly PD he began noting increased dizziness; BP was re-checked at that time and found to be 106/58. The patient experienced similar findings at admission with confusion, dizziness, and weakness after completing PD. On repeat exam the patient was found to be unable to speak with minor facial droop, and right-sided weakness. Stat CT head was ordered: initial review was negative for acute hemorraghic stroke.     Both incidence of stroke like symptoms have occurred after completing peritoneal dialysis treatment. The patient noted lightheadedness and dizziness on exam today with /58. The patient has exhibited multiple episodes of tachycardia with corresponding drops in diastolic blood pressure below 60, or with concurring PD therapy. The patient may be experiencing episodes of transient cerebral hypoperfusion during treatment. Would like to consult neurology and nephrology about the risk vs benefits of peritoneal dialysis vs hemodialysis in respect to BP changes during treatment and its effect on cerebral perfusion.   Plan:  - D/C heparin drip  - Repeat platelet count: pending  - BP control: goal 120>SBP<160   - Change  amlodipine to 5mg  - Continue Atorvastatin and insulin therapy  - Allowing GI soft diet; recommending 1:1 feeds     #ESRD  By hx; pts BUN/Cr = 63/8.56; GFR=6 on admit. The patient has an elevated troponin due to ESRD. The patient had trouble with his PD while in patient; 1.5 vs 2.5% PD bags. Please see nephrology and dialysis notes for further details.   - Continue PD QHS  - Nephrology to follow     #Diabetes Mellitus   A1C = 9.3; insulin dependent; home lantus 35 units QHS  - Switch from insulin sliding scale to lantus 10 units QHS; titrate up as needed; goal blood glucose 120-150  - The patient agreed to BID fingersticks after large meals to adjust med doses as necessary      #Murmur   #Secondary to Aortic valve replacement  The patient has remained sub therapeutic on warfarin since we held it at admission; after being found supra-theraputic in ED. Today's am INR was therapeutic at 2.27.  - D/C heparin drip   - Goal 2.0 - 3.0     - Resume warfarin therapy      #Hepatitis A  The patient was HAV IgM assay positive during this admission; ordered by nephrology. The health department has contacted our services to assess the needs for treatment.  - Please contact the ordering provider for further follow up.

## 2019-04-19 NOTE — CARE PLAN
Problem: Respiratory:  Goal: Respiratory status will improve  No oxygen needed.  Cough sometimes productive.

## 2019-04-19 NOTE — THERAPY
Speech Therapy Contact Note    Per RN, pt showing s/sx of CVA today, needs to go for CT with contrast. SLP to hold aphasia/cog eval for now. Advised RN that if pt is having any new difficulty swallowing, please page SLP for reassessment as indicated.

## 2019-04-19 NOTE — PROGRESS NOTES
Internal Medicine Interval Note  Note Author: Corby Ravi M.D.     Name Efrain Martines 1950   Age/Sex 69 y.o. male   MRN [id number]   Code Status FULL     After 5PM or if no immediate response to page, please call for cross-coverage  Attending/Team: Salvatore/Nicole See Patient List for primary contact information  Call [phone] to page    1st Call - Day Intern (R1):   Breonna 2nd Call - Day Sr. Resident (R2/R3):   Kd         Reason for interval visit  (Principal Problem)   Acute CVA/TIA with expressive aphasia       Interval Problem Daily Status Update  (24 hours, problem oriented, brief subjective history, new lab/imaging data pertinent to that problem)       Pt did not discharge to Rehab today due to return of stroke-like ymptoms.   Re-evaluated by Neurology, repeat neuro-imaging pending.   He is uremic, Nephrology folowing, pt is open having HD. Nephrology is aware.       - He still refuses routine finger sticks and insulins.   - CCPD nightly  - On heparin drip as a bridge to warfarin in the setting of mechanical aortic valve. INR 2.27 today. Heparin drip stopped.   - Accepted to IRF, medically stable to discharge       Review of Systems   Constitutional: Negative for chills, fever and malaise/fatigue.   HENT: Negative for congestion, sinus pain and sore throat.    Eyes: Positive for blurred vision. Negative for double vision and photophobia.        Due to previous CVA   Respiratory: Negative for cough, hemoptysis, shortness of breath and wheezing.    Cardiovascular: Positive for chest pain and orthopnea. Negative for palpitations and PND.        Chronic orthopnea   Gastrointestinal: Negative for abdominal pain, constipation, heartburn, nausea and vomiting.   Genitourinary: Negative.    Musculoskeletal: Positive for back pain. Negative for joint pain.   Neurological: Positive for tremors. Negative for dizziness and weakness.        Baseline tremor    Psychiatric/Behavioral: Negative.       Disposition/Barriers to discharge:   IP: INR therapeutic today.  Stable to discharge to inpatient acute rehab.        Consultants/Specialty  Nephrology   PT/OT/SLP  Physiatry   Neurology  Pharmacy      PCP: [facility]    Quality Measures  Quality-Core Measures   Reviewed items::  Labs reviewed, Medications reviewed and Radiology images reviewed  Leach catheter::  No Leach  DVT prophylaxis pharmacological::  Warfarin (Coumadin) and Heparin      Physical Exam       Vitals:    04/18/19 1200 04/18/19 1500 04/18/19 2000 04/19/19 0400   BP: 118/61 118/64 113/56 (!) 97/78   Pulse: 94 98 (!) 102 (!) 101   Resp: 15 15 16 16   Temp:   36.3 °C (97.3 °F) 36.4 °C (97.5 °F)   TempSrc:   Temporal Temporal   SpO2: 92% 95% 92% 98%   Weight:       Height:         Body mass index is 27.07 kg/m².    Oxygen Therapy:  Pulse Oximetry: 98 %, O2 (LPM): 0, O2 Delivery: None (Room Air)    Physical Exam   Constitutional: He is oriented to person, place, and time and well-developed, well-nourished, and in no distress. No distress.   HENT:   Head: Normocephalic and atraumatic.   Mouth/Throat: No oropharyngeal exudate.     Edentulous    Eyes: Pupils are equal, round, and reactive to light. Conjunctivae and EOM are normal. Right eye exhibits no discharge. Left eye exhibits no discharge. No scleral icterus.   Neck: Normal range of motion. Neck supple. No JVD present. No tracheal deviation present. No thyromegaly present.   Cardiovascular: Normal rate and regular rhythm.    Murmur heard.  1+ peripheral pulses through out   Abdominal: Soft. Bowel sounds are normal. He exhibits no distension. There is no tenderness. There is no rebound and no guarding.   Musculoskeletal: Normal range of motion. He exhibits no edema, tenderness or deformity.   Neurological: He is alert and oriented to person, place, and time. No cranial nerve deficit. GCS score is 15.   No facial droop.   Aphasia:  improving  Strength and sensation intact and symmetric.   A&O x3   Skin: Skin is warm and dry. No rash noted. He is not diaphoretic. No erythema. No pallor.   Psychiatric: Mood, memory, affect and judgment normal.   Nursing note and vitals reviewed.        Assessment/Plan     * CVA (cerebral vascular accident) (HCC)- (present on admission)   Assessment & Plan    Aphasia improved since admit    Plan:  On home antihypertensives   Atorvastatin    Seen by SLP, now off Cortrak, per pt's insistence   PT/OT: recommend acute rehab prior to d/c home  Neuro: images reviewed, no new stroke. Appreciate their input  Physiatry: Accepted to [facility] acute rehab       ESRD (end stage renal disease) ([facility])- (present on admission)   Assessment & Plan    Gets PD QHS per Nephrology.     Plan:  Nephrology following, appreciate their input: peritoenal fluid clx NGTD, got one dose of Ancef + gentamycin.     Chronic anticoagulation- (present on admission)   Assessment & Plan    For mechanical  St. Alex aortic valve, placed in 1999. On coumadin. INR 4.80 on admission. Held.   Neuroimaging neg for hemorrhage. Seen by neurology: resume warfarin.   INR 2.27 today.  Of heparin drip    Plan:  On Warfarin   Target INR 2-3.          Diabetes (HCC)- (present on admission)   Assessment & Plan    A1C 9.3    Plan:  diabetic diet: intermittently refusing insulin and finger sticks  Glargine + SSI with hypoglycemic protocol     Hypertension- (present on admission)   Assessment & Plan    Stable    Plan:  Resumed Lisinopril and amlodipine     Hepatitis A- (present on admission)   Assessment & Plan    LFTs wnl.       Plan:  Monitor  Nephrology following       Elevated troponin- (present on admission)   Assessment & Plan    He has elevated troponin in the setting of ESRD and paced VR. Not concerning for ACS.     Plan:  Getting PD         High anion gap metabolic acidosis- (present on admission)   Assessment & Plan    Secondary to uremia in this ESRD  patient.  Improving with PD    Plan:  Nephology following will continue to get PD     Leukocytosis- (present on admission)   Assessment & Plan    RESOLVED       History of mechanical fall without injury- (present on admission)   Assessment & Plan    Fell on the day prior to admission and was found down the morning of admission.       Plan:  Fall/Seizure precautions  PT/OT: recommend acute rehab prior to d/c home         Hypothyroidism- (present on admission)   Assessment & Plan    Stable    Plan:  Continue home synthroid       Hyperlipidemia- (present on admission)   Assessment & Plan    Was on home atorvastatin 40.     Plan:  Continue Atorvastatin 40

## 2019-04-19 NOTE — DISCHARGE INSTRUCTIONS
Discharge Instructions    Discharged to other by [facility] with escort. Discharged via wheelchair, hospital escort: Yes.  Special equipment needed: Not Applicable    Be sure to schedule a follow-up appointment with your primary care doctor or any specialists as instructed.     Discharge Plan:   Diet Plan: Discussed  Activity Level: Discussed  Confirmed Follow up Appointment: No (Comments)  Confirmed Symptoms Management: Discussed  Medication Reconciliation Updated: Yes  Influenza Vaccine Indication: Patient Refuses (11/2018)    I understand that a diet low in cholesterol, fat, and sodium is recommended for good health. Unless I have been given specific instructions below for another diet, I accept this instruction as my diet prescription.   Other diet: Dysphagia III, thin liquids    Special Instructions: None    · Is patient discharged on Warfarin / Coumadin?   Yes    You are receiving the drug warfarin. Please understand the importance of monitoring warfarin with scheduled PT/INR blood draws.  Follow-up with the [facility] in one week for INR lab..    IMPORTANT: HOW TO USE THIS INFORMATION:  This is a summary and does NOT have all possible information about this product. This information does not assure that this product is safe, effective, or appropriate for you. This information is not individual medical advice and does not substitute for the advice of your health care professional. Always ask your health care professional for complete information about this product and your specific health needs.      WARFARIN - ORAL (WARF-uh-rin)      COMMON BRAND NAME(S): Coumadin      WARNING:  Warfarin can cause very serious (possibly fatal) bleeding. This is more likely to occur when you first start taking this medication or if you take too much warfarin. To decrease your risk for bleeding, your doctor or other health care provider will monitor you closely and check your lab results (INR test) to make sure you are not  "taking too much warfarin. Keep all medical and laboratory appointments. Tell your doctor right away if you notice any signs of serious bleeding. See also Side Effects section.      USES:  This medication is used to treat blood clots (such as in deep vein thrombosis-DVT or pulmonary embolus-PE) and/or to prevent new clots from forming in your body. Preventing harmful blood clots helps to reduce the risk of a stroke or heart attack. Conditions that increase your risk of developing blood clots include a certain type of irregular heart rhythm (atrial fibrillation), heart valve replacement, recent heart attack, and certain surgeries (such as hip/knee replacement). Warfarin is commonly called a \"blood thinner,\" but the more correct term is \"anticoagulant.\" It helps to keep blood flowing smoothly in your body by decreasing the amount of certain substances (clotting proteins) in your blood.      HOW TO USE:  Read the Medication Guide provided by your pharmacist before you start taking warfarin and each time you get a refill. If you have any questions, ask your doctor or pharmacist. Take this medication by mouth with or without food as directed by your doctor or other health care professional, usually once a day. It is very important to take it exactly as directed. Do not increase the dose, take it more frequently, or stop using it unless directed by your doctor. Dosage is based on your medical condition, laboratory tests (such as INR), and response to treatment. Your doctor or other health care provider will monitor you closely while you are taking this medication to determine the right dose for you. Use this medication regularly to get the most benefit from it. To help you remember, take it at the same time each day. It is important to eat a balanced, consistent diet while taking warfarin. Some foods can affect how warfarin works in your body and may affect your treatment and dose. Avoid sudden large increases or decreases " in your intake of foods high in vitamin K (such as broccoli, cauliflower, cabbage, brussels sprouts, kale, spinach, and other green leafy vegetables, liver, green tea, certain vitamin supplements). If you are trying to lose weight, check with your doctor before you try to go on a diet. Cranberry products may also affect how your warfarin works. Limit the amount of cranberry juice (16 ounces/480 milliliters a day) or other cranberry products you may drink or eat.      SIDE EFFECTS:  Nausea, loss of appetite, or stomach/abdominal pain may occur. If any of these effects persist or worsen, tell your doctor or pharmacist promptly. Remember that your doctor has prescribed this medication because he or she has judged that the benefit to you is greater than the risk of side effects. Many people using this medication do not have serious side effects. This medication can cause serious bleeding if it affects your blood clotting proteins too much (shown by unusually high INR lab results). Even if your doctor stops your medication, this risk of bleeding can continue for up to a week. Tell your doctor right away if you have any signs of serious bleeding, including: unusual pain/swelling/discomfort, unusual/easy bruising, prolonged bleeding from cuts or gums, persistent/frequent nosebleeds, unusually heavy/prolonged menstrual flow, pink/dark urine, coughing up blood, vomit that is bloody or looks like coffee grounds, severe headache, dizziness/fainting, unusual or persistent tiredness/weakness, bloody/black/tarry stools, chest pain, shortness of breath, difficulty swallowing. Tell your doctor right away if any of these unlikely but serious side effects occur: persistent nausea/vomiting, severe stomach/abdominal pain, yellowing eyes/skin. This drug rarely has caused very serious (possibly fatal) problems if its effects lead to small blood clots (usually at the beginning of treatment). This can lead to severe skin/tissue damage that  may require surgery or amputation if left untreated. Patients with certain blood conditions (protein C or S deficiency) may be at greater risk. Get medical help right away if any of these rare but serious side effects occur: painful/red/purplish patches on the skin (such as on the toe, breast, abdomen), change in the amount of urine, vision changes, confusion, slurred speech, weakness on one side of the body. A very serious allergic reaction to this drug is rare. However, get medical help right away if you notice any symptoms of a serious allergic reaction, including: rash, itching/swelling (especially of the face/tongue/throat), severe dizziness, trouble breathing. This is not a complete list of possible side effects. If you notice other effects not listed above, contact your doctor or pharmacist. In the [place] - Call your doctor for medical advice about side effects. You may report side effects to FDA at 7-790-XNN-4496. In [place] - Call your doctor for medical advice about side effects. You may report side effects to [facility] [place] at 1-733.707.5979.      PRECAUTIONS:  Before taking warfarin, tell your doctor or pharmacist if you are allergic to it; or if you have any other allergies. This product may contain inactive ingredients, which can cause allergic reactions or other problems. Talk to your pharmacist for more details. Before using this medication, tell your doctor or pharmacist your medical history, especially of: blood disorders (such as anemia, hemophilia), bleeding problems (such as bleeding of the stomach/intestines, bleeding in the brain), blood vessel disorders (such as aneurysms), recent major injury/surgery, liver disease, alcohol use, mental/mood disorders (including memory problems), frequent falls/injuries. It is important that all your doctors and dentists know that you take warfarin. Before having surgery or any medical/dental procedures, tell your doctor or dentist that you are taking this  medication and about all the products you use (including prescription drugs, nonprescription drugs, and herbal products). Avoid getting injections into the muscles. If you must have an injection into a muscle (for example, a flu shot), it should be given in the arm. This way, it will be easier to check for bleeding and/or apply pressure bandages. This medication may cause stomach bleeding. Daily use of alcohol while using this medicine will increase your risk for stomach bleeding and may also affect how this medication works. Limit or avoid alcoholic beverages. If you have not been eating well, if you have an illness or infection that causes fever, vomiting, or diarrhea for more than 2 days, or if you start using any antibiotic medications, contact your doctor or pharmacist immediately because these conditions can affect how warfarin works. This medication can cause heavy bleeding. To lower the chance of getting cut, bruised, or injured, use great caution with sharp objects like safety razors and nail cutters. Use an electric razor when shaving and a soft toothbrush when brushing your teeth. Avoid activities such as contact sports. If you fall or injure yourself, especially if you hit your head, call your doctor immediately. Your doctor may need to check you. The Food & Drug Administration has stated that generic warfarin products are interchangeable. However, consult your doctor or pharmacist before switching warfarin products. Be careful not to take more than one medication that contains warfarin unless specifically directed by the doctor or health care provider who is monitoring your warfarin treatment. Older adults may be at greater risk for bleeding while using this drug. This medication is not recommended for use during pregnancy because of serious (possibly fatal) harm to an unborn baby. Discuss the use of reliable forms of birth control with your doctor. If you become pregnant or think you may be pregnant,  "tell your doctor immediately. If you are planning pregnancy, discuss a plan for managing your condition with your doctor before you become pregnant. Your doctor may switch the type of medication you use during pregnancy. Very small amounts of this medication may pass into breast milk but is unlikely to harm a nursing infant. Consult your doctor before breast-feeding.      DRUG INTERACTIONS:  Drug interactions may change how your medications work or increase your risk for serious side effects. This document does not contain all possible drug interactions. Keep a list of all the products you use (including prescription/nonprescription drugs and herbal products) and share it with your doctor and pharmacist. Do not start, stop, or change the dosage of any medicines without your doctor's approval. Warfarin interacts with many prescription, nonprescription, vitamin, and herbal products. This includes medications that are applied to the skin or inside the vagina or rectum. The interactions with warfarin usually result in an increase or decrease in the \"blood-thinning\" (anticoagulant) effect. Your doctor or other health care professional should closely monitor you to prevent serious bleeding or clotting problems. While taking warfarin, it is very important to tell your doctor or pharmacist of any changes in medications, vitamins, or herbal products that you are taking. Some products that may interact with this drug include: capecitabine, imatinib, mifepristone. Aspirin, aspirin-like drugs (salicylates), and nonsteroidal anti-inflammatory drugs (NSAIDs such as ibuprofen, naproxen, celecoxib) may have effects similar to warfarin. These drugs may increase the risk of bleeding problems if taken during treatment with warfarin. Carefully check all prescription/nonprescription product labels (including drugs applied to the skin such as pain-relieving creams) since the products may contain NSAIDs or salicylates. Talk to your doctor " about using a different medication (such as acetaminophen) to treat pain/fever. Low-dose aspirin and related drugs (such as clopidogrel, ticlopidine) should be continued if prescribed by your doctor for specific medical reasons such as heart attack or stroke prevention. Consult your doctor or pharmacist for more details. Many herbal products interact with warfarin. Tell your doctor before taking any herbal products, especially bromelains, coenzyme Q10, cranberry, danshen, dong quai, fenugreek, garlic, ginkgo biloba, ginseng, and Adam's wort, among others. This medication may interfere with a certain laboratory test to measure theophylline levels, possibly causing false test results. Make sure laboratory personnel and all your doctors know you use this drug.      OVERDOSE:  If overdose is suspected, contact a poison control center or emergency room immediately. [place] residents can call the [place] National Poison Hotline at 1-256.124.2160. [place] residents can call a provincial poison control center. Symptoms of overdose may include: bloody/black/tarry stools, pink/dark urine, unusual/prolonged bleeding.      NOTES:  Do not share this medication with others. Laboratory and/or medical tests (such as INR, complete blood count) must be performed periodically to monitor your progress or check for side effects. Consult your doctor for more details.      MISSED DOSE:  For the best possible benefit, do not miss any doses. If you do miss a dose and remember on the same day, take it as soon as you remember. If you remember on the next day, skip the missed dose and resume your usual dosing schedule. Do not double the dose to catch up because this could increase your risk for bleeding. Keep a record of missed doses to give to your doctor or pharmacist. Contact your doctor or pharmacist if you miss 2 or more doses in a row.      STORAGE:  Store at room temperature away from light and moisture. Do not store in the bathroom. Keep all  medications away from children and pets. Do not flush medications down the toilet or pour them into a drain unless instructed to do so. Properly discard this product when it is  or no longer needed. Consult your pharmacist or local waste disposal company for more details about how to safely discard your product.      MEDICAL ALERT:  Your condition and medication can cause complications in a medical emergency. For information about enrolling in MedicAlert, call 1-870.501.1850 ([place]) or 1-272.823.3469 ([place]).      Information last revised 2010 Copyright(c) 2010 [employer]             Depression / Suicide Risk    As you are discharged from this [facility] facility, it is important to learn how to keep safe from harming yourself.    Recognize the warning signs:  · Abrupt changes in personality, positive or negative- including increase in energy   · Giving away possessions  · Change in eating patterns- significant weight changes-  positive or negative  · Change in sleeping patterns- unable to sleep or sleeping all the time   · Unwillingness or inability to communicate  · Depression  · Unusual sadness, discouragement and loneliness  · Talk of wanting to die  · Neglect of personal appearance   · Rebelliousness- reckless behavior  · Withdrawal from people/activities they love  · Confusion- inability to concentrate     If you or a loved one observes any of these behaviors or has concerns about self-harm, here's what you can do:  · Talk about it- your feelings and reasons for harming yourself  · Remove any means that you might use to hurt yourself (examples: pills, rope, extension cords, firearm)  · Get professional help from the community (Mental Health, Substance Abuse, psychological counseling)  · Do not be alone:Call your Safe Contact- someone whom you trust who will be there for you.  · Call your local CRISIS HOTLINE 286-2677 or 925-782-6578  · Call your local Children's Mobile Crisis Response  Team [facility] (675) 903-6894 or www.[employer]  · Call the toll free National Suicide Prevention Hotlines   · National Suicide Prevention Lifeline 732-141-HDWO (2161)  · National Hope Line Network 800-SUICIDE (033-8418)

## 2019-04-19 NOTE — DISCHARGE PLANNING
Dr. Mari will accept [facility] to acute rehab. Transport is scheduled 10a. Nursing to call report to [phone]. Bedside RN Jennifer aware. CHAYA Powers aware. [facility] will follow to assist as needed with transition to [facility].

## 2019-04-20 NOTE — PROGRESS NOTES
Inpatient Anticoagulation Service Note    Date: 4/20/2019  Reason for Anticoagulation: Aortic Mechanical Valve Replacement  , Stroke            Hemoglobin Value: 14.3  Hematocrit Value: 43.3  Lab Platelet Value: (!) 136  Target INR: 2.0 to 3.0    INR from last 7 days     Date/Time INR Value    04/20/19 1149 (!)  2.23    04/19/19 0028 (!)  2.27    04/18/19 0054 (!)  1.52    04/17/19 1619 (!)  1.41    04/17/19 0217 (!)  1.3    04/16/19 0253 (!)  1.47    04/15/19 0336 (!)  3.22    04/14/19 1102 (!)  5.56        Dose from last 7 days     Date/Time Dose (mg)    04/20/19 1508  5    04/19/19 0807  2.5    04/18/19 1643  6    04/17/19 1100  6    04/16/19 0800  5    04/15/19 1300  2.5    04/14/19 1400  0        Average Dose (mg):  (Home Dose:  2.5 mg Tu/We and 5 mg all other days)  Significant Interactions: Statin, Thyroid Medications  Bridge Therapy: No  INR Value Greater than 2 Prior to Discontinuation of Parenteral Anticoagulation: Not Applicable     Reversal Agent Administered: Not Applicable  Comments: Goal range for INR clarified to be 2-3.  INR within goal range with no new DDI, no s/sx of bleeding.  Heparin bridge discontinued 4/19.  Will continued with the new, reduced dose regimen as patient was admitted with a supratherapeutic INR.    Plan:  Warfarin 5mg.  INR in AM  Education Material Provided?: No (chronic warfarin patient)  Pharmacist suggested discharge dosing: Warfarin 2.5mg Monday/Wednesday/Friday and 5mg all other days.  Follow up within 72 hours of discharge.     Loyd Macdonald

## 2019-04-20 NOTE — THERAPY
SPEECH THERAPY CONTACT NOTE: Spoke to RN regarding repeat CSE. RN reporting pt is currently NPO pending possible temporary dialysis cath placement. Will hold and re-attempt as able.

## 2019-04-20 NOTE — PROGRESS NOTES
"Patient is unable to follow orientation assessment and keeps answering \"yes\" to questions. He was able to do hand  and plantar flexion and extension. He remains on strict NPO, re-educated of the indication. He was asking for coke and drinks tonight. Offered mouth swabs. Patient denies any pain. Bed alarm in place. He refused SCDs.   "

## 2019-04-20 NOTE — PROGRESS NOTES
[facility] Progress Note        [employer] disconnected aseptically at 0900. RN arrived earlier to disconnect pt but machine alerted low drain volume, reset and attempted troubleshooting Pt stable, VSS, alert and oriented. Procedure explained to pt and pt verbalized understanding.     PD exit site clean with dressing in place, CDI.    Effluent clear yellow with no fibrin noted.     24 hour UF: -338mL (-339mL total UF + 1mL initial drain - 0mL last fill)      Report given to primary, RN.

## 2019-04-20 NOTE — PROGRESS NOTES
IR Procedure RN's Note:    Obtained consent from patient's wife Ameena Martines over the phone; spouse gave telephone consent and verified by Jabari HERNANDEZ RN.    Temporary dialysis catheter placement done by Dr. Hand; RIGHT IJ access site; pt assessed upon arrival, pt A/Ox0, pt aware of the procedure, pt baseline - very confused, verbal, but not oriented; BARD PowerTrialysis short term dialysis 12F x 15cm REF#[id number] LOT#[id number]; pt appears comfortable throughout the procedure with no signs of distress or discomfort; catheter sutured in place; access site CDI, soft with no signs of hematoma or bleeding, gauze and tegaderm for dressing; telephone report given to Jamila; pt is awake and on 2L NC O2 post procedure and during transport; pt transported to dialysis for treatment.

## 2019-04-20 NOTE — PROGRESS NOTES
"[facility] Nephrology Daily Progress Note    Date of Service  4/20/2019    Chief Complaint  69 y.o. male admitted 4/13/2019 with expressive aphasia.  Found by wife on floor unresponsive in pool of urine and feces with PD catheter uncapped.     Interval Problem Update  4/13/19 - Renal consult.  Ordered PD.  Sent PD fluid for cell count, gram stain, and culture because of possible contamination prior to admission.  4/14/19 - Feels fine.  Speech is now ~normal.   4/15/19 - Feels ok. CCPD is going well. Net UF is 200 mL.  4/16/19 - Pt says he feels fine. Denies any complaints/concerns. Peritoneal fluid negative. Started on dysphagia diet.  4/17/19 - No new overnight renal events. Repeat CT did not show any new stroke. Low drain volume alarm. Patient was repositioned without success. PD cath was aseptically flushed with NS 10 cc. No resistant was felt instiling and withdrawing the NS solution. Patient has no abdominal discomfort.  4/18/19 - No issues with CCPD. Net  mL. Pt denies complaints/concerns. Says he will be going to rehab.  4/19/19 - Disconnected from CCP[facility] this AM 0745, Net UF = 1814, pt is globally aphasic with worsening L sided facial droop, was supposed to tx to rehab, STAT head CT pending   4/20/19 - low UF with PD yesterday, neurology expressed concern regarding BUN given neuro changes, will plan for HD x2-3 days, awaiting line placement    Review of Systems--answers \"no\" to some questions  Review of Systems   Unable to perform ROS: Patient nonverbal        Physical Exam  Temp:  [36.1 °C (96.9 °F)-36.7 °C (98 °F)] 36.1 °C (97 °F)  Pulse:  [] 103  Resp:  [16-18] 18  BP: (111-145)/(65-73) 111/65  SpO2:  [90 %-96 %] 90 %    Physical Exam   Constitutional: He appears well-developed and well-nourished.   HENT:   Head: Normocephalic and atraumatic.   Eyes: Conjunctivae and EOM are normal.   Neck: Neck supple.   Cardiovascular: Normal rate and regular rhythm.    Pulmonary/Chest: Effort normal and " breath sounds normal.   Abdominal: Soft. Bowel sounds are normal.   PD catheter in place. CDI.   Musculoskeletal: He exhibits no edema or tenderness.   Neurological: He is alert.   Aphasic/L facial droop    Skin: Skin is warm and dry.       Fluids    Intake/Output Summary (Last 24 hours) at 04/20/19 1123  Last data filed at 04/20/19 0900   Gross per 24 hour   Intake                0 ml   Output             -338 ml   Net              338 ml       Laboratory  Recent Labs      04/17/19   1619   PLATELETCT  145*     Recent Labs      04/18/19   0054  04/19/19   0028   SODIUM  140  137   POTASSIUM  4.3  4.1   CHLORIDE  102  101   CO2  23  23   GLUCOSE  203*  227*   BUN  74*  77*   CREATININE  9.27*  9.48*   CALCIUM  9.9  10.1     Recent Labs      04/17/19   1619  04/18/19   0054   04/18/19   1633  04/19/19   0028  04/19/19   0635   APTT  28.2  42.8*   < >  54.0*  90.8*  67.8*   INR  1.41*  1.52*   --    --   2.27*   --     < > = values in this interval not displayed.               Imaging  reviewed      Assessment/Plan        ESRD on CCPD. Will transition to HD for now, awaiting line placement. Daily labs and weights, renally dose medications     TIA/CVA. Neuro notes reviewed, will provide HD for now     DM. Per primary service     HTN. BP acceptable, but labile. Low dose lisinopril noted, will hold for now, dialyzable, will provide ARB if further control needed     CKD MBD: continue calcitriol, binder, check phos in AM      AV replacement: continue warfarin, per primary service     Anemia of CKD: Hgb at goal, no MUNA    Other management per primary service

## 2019-04-20 NOTE — PROGRESS NOTES
Spoke with Dr. Ravi:  Orders received to keep pt NPO due to changed neurological status.  Speech eval ordered for dysphagia.  Temporary dialysis port to be placed by IR tomorrow (Saturday) or Sunday for hemodialysis.  Will continue peritoneal dialysis at [facility] until then.

## 2019-04-20 NOTE — PROGRESS NOTES
Received report and assumed pt care.  Some confusion this morning, but unable to assess orientation.  Refuses to answer questions.  Bed alarm and treaded socks on.  Call light and personal belongings within reach.  Bed locked and at lowest position.  SUNDAY VALLES.  Wife at bedside.  Pt remains NPO today for possible temporary dialysis cath placement.

## 2019-04-20 NOTE — PROGRESS NOTES
"Patient has refused labs and blood sugar check this morning. He is irritable and says \"NO\" to care offered.   "

## 2019-04-20 NOTE — PROGRESS NOTES
[facility] Progress Note       HD ordered by Dr. Reich. Treatment started at 1413 and ended at 1613.    Net UF removed: 1000 mL.     Pt tolerated treatment well with no issues. See paper flow sheet for details. CVC patent, locked with Heparin 1,000 units. No s/s of infection present. Dressing in place, CDI. Report given to CRISTEL Blake RN.

## 2019-04-20 NOTE — PROGRESS NOTES
Opal AYON from IR picked pt up for temporary HD cath placement.  Pt left in stable condition via hospital bed.  Opal AYON to call wife for consent.

## 2019-04-20 NOTE — PROGRESS NOTES
Internal Medicine Interval Note  Note Author: Corby Ravi M.D.     Name Efrain Martines 1950   Age/Sex 69 y.o. male   MRN [id number]   Code Status FULL     After 5PM or if no immediate response to page, please call for cross-coverage  Attending/Team: Salvatore/Nicole See Patient List for primary contact information  Call [phone] to page    1st Call - Day Intern (R1):   Breonna 2nd Call - Day Sr. Resident (R2/R3):   Kd         Reason for interval visit  (Principal Problem)   Acute CVA/TIA with expressive aphasia       Interval Problem Daily Status Update  (24 hours, problem oriented, brief subjective history, new lab/imaging data pertinent to that problem)     No acute events overnight.   Neurological Sx appear to be improving.   On Peritoneal dialysis, will get HD after placement on line today.   INR, 2.23 today, Pharmacy following.      Review of Systems   Unable to perform ROS: Medical condition   Eyes:        Due to previous CVA   Cardiovascular:        Chronic orthopnea   Neurological:        Baseline tremor      Disposition/Barriers to discharge:   IP: INR therapeutic today.  Stable to discharge to inpatient acute rehab.        Consultants/Specialty  Nephrology   PT/OT/SLP  Physiatry   Neurology  Pharmacy      PCP: Dr. Tyshawn SOLIS, [facility]    Quality Measures  Quality-Core Measures   Reviewed items::  Labs reviewed and Medications reviewed  Leach catheter::  No Leach  DVT prophylaxis pharmacological::  Warfarin (Coumadin) and Heparin      Physical Exam       Vitals:    19 1034 19 2000 19 0400 19 0800   BP: 121/54 145/73 129/73 111/65   Pulse: (!) 110 (!) 106 99 (!) 103   Resp:     Temp:  36.7 °C (98 °F) 36.1 °C (96.9 °F) 36.1 °C (97 °F)   TempSrc:  Temporal Temporal Temporal   SpO2:  96% 94% 90%   Weight:       Height:         Body mass index is 27.07 kg/m².    Oxygen Therapy:  Pulse Oximetry: 90 %, O2 (LPM): 0, O2 Delivery: None (Room  Air)    Physical Exam   Constitutional: He is well-developed, well-nourished, and in no distress. No distress.   HENT:   Head: Normocephalic and atraumatic.   Mouth/Throat: No oropharyngeal exudate.     Edentulous    Eyes: Pupils are equal, round, and reactive to light. Conjunctivae and EOM are normal. Right eye exhibits no discharge. Left eye exhibits no discharge. No scleral icterus.   Neck: Normal range of motion. Neck supple. No JVD present. No tracheal deviation present. No thyromegaly present.   Cardiovascular: Normal rate and regular rhythm.    Murmur heard.  1+ peripheral pulses through out   Abdominal: Soft. Bowel sounds are normal. He exhibits no distension. There is no tenderness. There is no rebound and no guarding.   Musculoskeletal: Normal range of motion. He exhibits no edema, tenderness or deformity.   Neurological: He is alert. No cranial nerve deficit. GCS score is 15.   Facial droop improving  Aphasia: improving  Strength 3/5 right, 5/5 left.   sensation intact  A&O x3   Skin: Skin is warm and dry. No rash noted. He is not diaphoretic. No erythema. No pallor.   Psychiatric: Mood, memory, affect and judgment normal.   Nursing note and vitals reviewed.        Assessment/Plan     * CVA (cerebral vascular accident) (HCC)- (present on admission)   Assessment & Plan    Aphasia improved since admit    Plan:  On home antihypertensives   Atorvastatin    NPO now due to AMS   PT/OT: recommend acute rehab prior to d/c home  Neuro: images reviewed, no new stroke. Appreciate their input  Physiatry: Accepted to [facility] acute rehab.   Rehab placement on hold       ESRD (end stage renal disease) (HCC)- (present on admission)   Assessment & Plan    Gets PD QHS per Nephrology.     Plan:  Transition to HD pending line placement  Nephrology following, appreciate their input: peritoenal fluid clx NGTD, got one dose of Ancef + gentamycin.     Chronic anticoagulation- (present on admission)   Assessment & Plan    For  mechanical  St. Alex aortic valve, placed in 1999. On coumadin. INR 4.80 on admission. Held.   Neuroimaging neg for hemorrhage. Seen by neurology: resume warfarin.  Of heparin drip    Plan:  On Warfarin   Target INR 2-3.          Diabetes (HCC)- (present on admission)   Assessment & Plan    A1C 9.3    Plan:  diabetic diet: intermittently refusing insulin and finger sticks  Glargine + SSI with hypoglycemic protocol     Hypertension- (present on admission)   Assessment & Plan    Stable    Plan:  Resumed Lisinopril and amlodipine     Hepatitis A- (present on admission)   Assessment & Plan    LFTs wnl.       Plan:  Monitor  Nephrology following       Elevated troponin- (present on admission)   Assessment & Plan    He has elevated troponin in the setting of ESRD and paced VR. Not concerning for ACS.     Plan:  Getting PD         High anion gap metabolic acidosis- (present on admission)   Assessment & Plan    Secondary to uremia in this ESRD patient.  Improving with PD    Plan:  Nephology following      Leukocytosis- (present on admission)   Assessment & Plan    RESOLVED       History of mechanical fall without injury- (present on admission)   Assessment & Plan    Fell on the day prior to admission and was found down the morning of admission.       Plan:  Fall/Seizure precautions  PT/OT: recommend acute rehab prior to d/c home  Rehab placement on hold for now         Hypothyroidism- (present on admission)   Assessment & Plan    Stable    Plan:  Continue home synthroid       Hyperlipidemia- (present on admission)   Assessment & Plan    Was on home atorvastatin 40.     Plan:  Continue Atorvastatin 40

## 2019-04-20 NOTE — PROGRESS NOTES
Connected aseptically to CCPD cycler @ 1920 with all 2.5% solution, PD cath. Intact. Dressing changed. Report given to NANY Sanches. See flow sheet for details.

## 2019-04-20 NOTE — DISCHARGE PLANNING
Anticipated Discharge Disposition:   IRF cancelled for now.     Action:   Discussed with Dr. Ibrahim who said that pt will be started on dialysis    Barriers to Discharge:   Medical clearance-declined in Kidney function-need for hemodialysis    Plan:   Follow pt.  Get updates from [employer] team.   Continue discharge planning.   Discuss with Vikki Swenson RN once HD is initiated

## 2019-04-21 PROBLEM — I60.9 SAH (SUBARACHNOID HEMORRHAGE) (HCC): Status: ACTIVE | Noted: 2019-01-01

## 2019-04-21 PROBLEM — J98.11 ATELECTASIS: Status: ACTIVE | Noted: 2019-01-01

## 2019-04-21 PROBLEM — J96.01 ACUTE HYPOXEMIC RESPIRATORY FAILURE (HCC): Status: ACTIVE | Noted: 2019-01-01

## 2019-04-21 PROBLEM — J69.0 ASPIRATION PNEUMONIA (HCC): Status: ACTIVE | Noted: 2019-01-01

## 2019-04-21 PROBLEM — I50.22 CHRONIC SYSTOLIC (CONGESTIVE) HEART FAILURE (HCC): Status: ACTIVE | Noted: 2019-01-01

## 2019-04-21 NOTE — DIETARY
"Nutrition services: Day 8 of admit.  Efrain Martines is a 69 y.o. male with admitting DX of AMS, ESRD on PD.     Consult received for Tubefeed. Pt previously on TF, see full assessment on 4/14 in dietary progress notes.     Current Problem List per MD notes:  1)  CVA/SAH noted today.  2) ESRD/peritoneal dialysis   3) Chronic anticoagulation  4) Acute on chronic systolic heart failure  5) Diabetes  6) HTN  7) Leukocytosis  8) Complete heart block  9) History of mechanical fall without injury  10) Hypothyroidism  11) Hyperlipidemia.         Assessment:  Height: 180.3 cm (5' 10.98\")  Weight: 82.8 kg (182 lb 8.7 oz)- per bed scale today.   Wt fluctuates, will use current wt as expected dry wt.   Ideal Body Weight: 78 kg (172 lb)  Percent Ideal Body Weight: 114.1  Body mass index is 25.47 kg/m².     Re-estimated needs: MSJ X 1.2= 1928  Kcals/day: 7136-4443 (23-26 kcal/kg)  G pro/day:  (1.0-1.6 g pro/kg)  Fluids/day: 1417-0079 ml/day (25-30 ml/kg).    Evaluation:   1. Pt s/p CVA/SAH and now lethargic, not appropriate for SLP evaluation/PO diet.  2. Cortrak feeding tube confirmed in stomach today.   3. Pertinent Labs: am Glu 191, BUN 55, Creat 8.71, Phos 7.9.   4. Pertinent Meds: Calcitriol, Vit B-12, Calciferol (future med), Lantus, SSI, Zofran PRN, Bowel protocol, Renvela.   5. Pt with high Phos, would benefit from specialize Renal formula.   6. Palliative care consult pending.    Malnutrition Risk: does not meet criteria    Recommendations/Plan:  1. Begin Novasource Renal @ 25 ml/hr and advance per TF protocol to goal rate of 40 ml/hr. This will provide 1920 kcals/day, 87 g pro/day, and 688 ml free water/day.   2. Fluids per MD.    RD following.           "

## 2019-04-21 NOTE — CARE PLAN
Problem: Communication  Goal: The ability to communicate needs accurately and effectively will improve    Intervention: [place] patient and significant other/support system to call light to alert staff of needs  Patient unable to speak and interact with staff at this time

## 2019-04-21 NOTE — CARE PLAN
Problem: Infection  Goal: Will remain free from infection    Intervention: Implement standard precautions and perform hand washing before and after patient contact  In place      Problem: Mobility  Goal: Risk for activity intolerance will decrease    Intervention: Assess and monitor signs of activity intolerance  Bedrest, neuro changes

## 2019-04-21 NOTE — ASSESSMENT & PLAN NOTE
Strict blood pressure control with goal SBP less than 140  Titrate nicardipine drip as needed  Monitor for the need for arterial line  Continue amlodipine, 10 mg daily  Adjust enteral regimen and IV as needed regimen daily with clinical pharmacist

## 2019-04-21 NOTE — PROGRESS NOTES
Dr. Brantley is still at the nursing station. Dr. Brantley was notified oral keppra was held for NPO. MD verbalized understanding. MD advised to give the IV keppra    Dr. Brantley also notified patient is restless as a nonverbal descriptor of pain. MD verbalized understanding. No new orders received    9776: Dr. Brantley was also notified that oral Nimotop could not be given due to NPO order. MD verbalized understanding   Principal Discharge DX:	Acute pulmonary edema  Secondary Diagnosis:	Stage 5 chronic kidney disease on chronic dialysis

## 2019-04-21 NOTE — PROGRESS NOTES
Dr. Bartlett is at bedside    New verbal orders: stat abg, sat bgl, stat ekg, stat bmp, stat cxr, stat u/a via painting and place painting, stat cbc    Okay to use IJ for labs per Dr. Bartlett    New orders received: stat head ct w and wout contrast. Ct neck w and wout contrast. NS 500ml bolus IV over 2 hours

## 2019-04-21 NOTE — THERAPY
Speech Therapy Contact Note:  Per RN, pt not responding or following directions, pt lethargic and not appropriate for swallow evaluation this date. SLP will assess pt as pt is appropriate.

## 2019-04-21 NOTE — PROGRESS NOTES
RN MOBILITY NOTE     Surgery patient?: No    Date of surgery:   Ambulated 50 ft on day of surgery? (N/A if today is not date of surgery):   Number of times ambulated 50 feet or greater today:   Patient has been up to chair, edge of bed or HOB 90 degrees for all meals?:   Goal met? (goal is ambulating at least 50 feet 2 times on day shift, one time on night shift):   If patient did not meet mobility goal, why?:

## 2019-04-21 NOTE — PROCEDURES
Date of service:  4/21/2019    Title:  Emergent endotracheal intubation    Indication:  Respiratory failure    Narrative:    The patient was sedated and paralyzed with 20 mg of IV etomidate and 60 mg of IV rocuronium.  A 8.0 Vietnamese endotracheal tube was placed directly into the trachea using a #4 Glidescope without difficulty or apparent complication.  The CO2 detector made the appropriate color change, bilateral symmetrical breath sounds are present and fogging is present in the endotracheal tube.  All of this confirms that the endotracheal tube is indeed in the patient's trachea.  The patient tolerated the procedure quite nicely.  No complications are apparent.      Neil Brothers MD  Pulmonary and Critical Care Medicine

## 2019-04-21 NOTE — PROGRESS NOTES
Dr. Bartlett and Dr. Brantley are at patient's bedside. Dr. Bartlett stated to me that patient was positive for hemorrhagic stroke on the CT done today. Dr. Brantley is on the phone trying to contact neurology. Dr. Brantley stated he would put patient on nicardipine drip and have patient transferred to icu.    2241: MDs are no longer at bedside. Bed control called and stated we need to have transfer orders from a provider in [facility] to get patient to icu. Page sent to Dr. Bartlett.     2242: Dr. Bartlett called back and was made aware of the above. Dr. Bartlett stated he would put in the transfer orders after talking with the intensivist.

## 2019-04-21 NOTE — CONSULTS
Cardiology Consult Note:    Addy To  Date & Time note created:    4/21/2019   12:10 AM     Referring MD:  Dr. Arroyo    Patient ID:   Name:             Efrain Martines     YOB: 1950  Age:                 69 y.o.  male   MRN:               [id number]                                                             Chief Complaint / Reason for consult:  Subarachnoid hemophage in setting of high INR and prior hisotry of AVR with mechanical valve in 1999, s/p dual chamber PPM.    History of Present Illness:    This is a 69 years old man with prior history of mechanical valve replacement in 1999, end-stage renal disease on hemodialysis, elevated INR, was admitted to the hospital after falling and stroke symptoms.  Patient was eventually found to have subarachnoid hemorrhage.  Cardiology has been consulted for further comment on anticoagulation in setting of subarachnoid hemorrhage and mechanical aortic valve.    Patient did have most recent transthoracic echocardiogram in our system in January 2018 which showed LV function of 50% and mean gradient across the mechanical aortic valve of 33 mmHg.  No significant valvular leak.  I personally interpreted the images of his transthoracic cardiogram.    Review of Systems:      Patient is confused. Not communicative.           Past Medical History:   Past Medical History:   Diagnosis Date   • Cerebral aneurysm 1990's    w/clot into L kidney    • Cervical radiculopathy    • Chronic hepatitis C (HCC)    • Chronic kidney disease     stage 4   • CVA (cerebral vascular accident) (HCC) 1993    residual right sided weakness   • DM (diabetes mellitus) (HCC)     type 2   • HTN (hypertension)    • Hyperlipidemia    • Hypothyroidism    • Obstructive sleep apnea     cpap nightly    • Pacemaker    • Peripheral neuropathy      Active Hospital Problems    Diagnosis   • CVA (cerebral vascular accident) (HCC) [I63.9]     Priority: High   • ESRD (end stage renal disease)  (HCC) [N18.6]     Priority: High   • Chronic anticoagulation [Z79.01]     Priority: Medium   • Hypertension [I10]     Priority: Medium   • Diabetes (HCC) [E11.9]     Priority: Medium   • Hypothyroidism [E03.9]     Priority: Low   • History of mechanical fall without injury [W19.XXXA]     Priority: Low   • Hyperlipidemia [E78.5]     Priority: Low   • Hepatitis A [B15.9]   • Leukocytosis [D72.829]   • High anion gap metabolic acidosis [E87.2]   • Elevated troponin [R74.8]       Past Surgical History:  Past Surgical History:   Procedure Laterality Date   • AORTIC VALVE REPLACEMENT     • MITRAL VALVE REPLACEMENT     • NEPHRECTOMY LAPAROSCOPIC Left        Hospital Medications:    Current Facility-Administered Medications:   •  prothrombin complex conc human (KCENTRA) 1000 units kit KIT 2,088 Units, 2,088 Units, Intravenous, Once, Fercho Brantley M.D.  •  phytonadione (AQUA-MEPHYTON) 10 mg in NS 50 mL IVPB, 10 mg, Intravenous, Once, Fercho Brantley M.D.  •  ampicillin/sulbactam (UNASYN) 3 g in  mL IVPB, 3 g, Intravenous, QHS, Yovani Bartlett M.D., Stopped at 04/20/19 2305  •  niCARdipine (CARDENE) 25 mg in  mL Infusion, 0-15 mg/hr, Intravenous, Continuous, Fercho Brantley M.D.  •  Respiratory Care per Protocol, , Nebulization, Continuous RT, Fercho Brantley M.D.  •  ondansetron (ZOFRAN ODT) dispertab 4 mg, 4 mg, Oral, Q4HRS PRN **OR** ondansetron (ZOFRAN) syringe/vial injection 4 mg, 4 mg, Intravenous, Q4HRS PRN, Fercho Brantley M.D.  •  acetaminophen (TYLENOL) tablet 650 mg, 650 mg, Oral, Q4HRS PRN **OR** acetaminophen (TYLENOL) suppository 650 mg, 650 mg, Rectal, Q4HRS PRN, Fercho Brantley M.D.  •  niMODipine (NIMOTOP) capsule 60 mg, 60 mg, Oral, Q4HRS, Fercho Brantley M.D., Stopped at 04/20/19 8020  •  MD Alert...ICU Electrolyte Replacement per Pharmacy, , Other, PHARMACY TO DOSE, Fercho Brantley M.D.  •  levETIRAcetam (KEPPRA) 1,000 mg in  mL IVPB, 1,000 mg, Intravenous, Q12HRS, Fercho PAYNE  EMEKA Brantley  •  levETIRAcetam (KEPPRA) tablet 500 mg, 500 mg, Oral, BID, Fercho Brantley M.D., Stopped at 04/20/19 2332  •  famotidine (PEPCID) tablet 20 mg, 20 mg, Oral, BID, Kapil Yi M.D., Stopped at 04/19/19 1800  •  amLODIPine (NORVASC) tablet 10 mg, 10 mg, Oral, Q DAY, Corby Ravi M.D., Stopped at 04/20/19 0600  •  insulin lispro (HUMALOG) injection 1-6 Units, 1-6 Units, Subcutaneous, TID AC, 2 Units at 04/20/19 1749 **AND** Accu-Chek Q6 if NPO, , , 2X DAILY **AND** NOTIFY MD and PharmD, , , Once **AND** glucose 4 g chewable tablet 16 g, 16 g, Oral, Q15 MIN PRN **AND** dextrose 50% (D50W) injection 25 mL, 25 mL, Intravenous, Q15 MIN PRN, Corby Ravi M.D.  •  senna-docusate (PERICOLACE or SENOKOT S) 8.6-50 MG per tablet 2 Tab, 2 Tab, Oral, BID, Stopped at 04/19/19 1800 **AND** polyethylene glycol/lytes (MIRALAX) PACKET 1 Packet, 1 Packet, Oral, QDAY PRN **AND** magnesium hydroxide (MILK OF MAGNESIA) suspension 30 mL, 30 mL, Oral, QDAY PRN **AND** bisacodyl (DULCOLAX) suppository 10 mg, 10 mg, Rectal, QDAY PRN, Niels Chase M.D.  •  levothyroxine (SYNTHROID) tablet 125 mcg, 125 mcg, Oral, QAM, Niels Chase M.D., Stopped at 04/20/19 0600  •  cyanocobalamin (VITAMIN B-12) tablet 500 mcg, 500 mcg, Oral, DAILY, Niels Chase M.D., Stopped at 04/20/19 0600  •  [START ON 4/22/2019] ergocalciferol (CALCIFEROL) drops 50,000 Units, 50,000 Units, Oral, Q7 DAYS, Niels Chase M.D.  •  calcitRIOL (ROCALTROL) capsule 0.5 mcg, 0.5 mcg, Oral, Once per day on Mon Thu, Niels Chase M.D., 0.5 mcg at 04/18/19 0532  •  calcitRIOL (ROCALTROL) capsule 0.25 mcg, 0.25 mcg, Oral, Once per day on Sun Tue Wed Fri Sat, Niels Chase M.D., Stopped at 04/20/19 0600  •  atorvastatin (LIPITOR) tablet 40 mg, 40 mg, Oral, Nightly, Corby Ravi M.D., Stopped at 04/19/19 2100  •  lisinopril (PRINIVIL) tablet 5 mg, 5 mg, Oral, Q DAY, Corby Ravi M.D., Stopped  at 04/20/19 0600  •  sevelamer carbonate (RENVELA) tablet 800 mg, 800 mg, Oral, TID WITH MEALS, Corby Ravi M.D., Stopped at 04/19/19 1730  •  insulin glargine (LANTUS) injection 10 Units, 10 Units, Subcutaneous, Q EVENING, Corby Ravi M.D., 10 Units at 04/20/19 1749  •  menthol-methyl salicylate (BENGAY) ointment, , Topical, TID PRN, Corby Ravi M.D.  •  artificial tears 1.4 % ophthalmic solution 1 Drop, 1 Drop, Both Eyes, 4X/DAY PRN, Corby Ravi M.D.  •  lidocaine (LIDODERM) 5 % 1 Patch, 1 Patch, Transdermal, Q24HRS, Corby Ravi M.D., Stopped at 04/14/19 0540    Current Outpatient Medications:  Prescriptions Prior to Admission   Medication Sig Dispense Refill Last Dose   • atorvastatin (LIPITOR) 40 MG Tab Take 40 mg by mouth every evening.   4/12/2019 at PM   • cyanocobalamin (VITAMIN B-12) 500 MCG Tab Take 500 mcg by mouth every day.   4/12/2019 at AM   • lidocaine (LIDODERM) 5 % Patch Apply 1 Patch to skin as directed every 24 hours. Neck and Lower back   4/12/2019 at removed   • lisinopril (PRINIVIL) 2.5 MG Tab Take 2.5 mg by mouth every 48 hours.   4/12/2019 at PM   • sevelamer (RENAGEL) 800 MG Tab Take 800 mg by mouth 3 times a day, with meals.   4/12/2019 at PM   • traZODone (DESYREL) 50 MG Tab Take 50 mg by mouth every evening.   4/12/2019 at PM   • artificial tears 1.4 % Solution Place 1 Drop in both eyes 4 times a day as needed.   4/12/2019 at PM   • calcitRIOL (ROCALTROL) 0.25 MCG Cap Take 0.25-0.5 mcg by mouth See Admin Instructions. 0.50 mcg every Monday and Thursday morning  0.25 mcg all other mornings   4/12/2019 at AM   • vitamin D, Ergocalciferol, (DRISDOL) 56134 units Cap capsule Take 50,000 Units by mouth every Monday.   4/8/2019 at Q7D   • insulin glargine (LANTUS) 100 UNIT/ML Solution Inject 35 Units as instructed every evening.   4/11/2019 at PM   • levothyroxine (SYNTHROID) 125 MCG Tab Take 125 mcg by mouth every morning.   4/12/2019 at  "AM   • metoprolol SR (TOPROL XL) 50 MG TABLET SR 24 HR Take 75 mg by mouth every morning.   4/12/2019 at AM   • pregabalin (LYRICA) 75 MG Cap Take 75 mg by mouth every morning.   4/12/2019 at AM   • warfarin (COUMADIN) 5 MG Tab Take 2.5-5 mg by mouth every evening. 2.5 mg on Tuesday and Wednesday   5 mg all other days   4/5/2019 at HELD       Medication Allergy:  No Known Allergies    Family History:  Family History   Problem Relation Age of Onset   • No Known Problems Mother    • No Known Problems Father        Social History:  Social History     Social History   • Marital status:      Spouse name: N/A   • Number of children: N/A   • Years of education: N/A     Occupational History   • Not on file.     Social History Main Topics   • Smoking status: Former Smoker     Packs/day: 1.00     Years: 20.00     Types: Cigarettes     Quit date: 1993   • Smokeless tobacco: Never Used   • Alcohol use No   • Drug use: No   • Sexual activity: No     Other Topics Concern   • Not on file     Social History Narrative    Lives in home in [place], [place].          Physical Exam:  Vitals/ General Appearance:   Weight/BMI: Body mass index is 27.07 kg/m².  /59   Pulse (!) 104   Temp 36.1 °C (96.9 °F) (Temporal)   Resp 16   Ht 1.803 m (5' 10.98\")   Wt 88 kg (194 lb 0.1 oz)   SpO2 96%   Vitals:    04/20/19 1836 04/20/19 2009 04/20/19 2120 04/20/19 2329   BP: (!) 90/68  113/59    Pulse: (!) 110  (!) 108 (!) 104   Resp: 18  16    Temp: 36 °C (96.8 °F) 36.1 °C (96.9 °F)     TempSrc: Temporal Temporal     SpO2: 90%  96% 96%   Weight:       Height:         Oxygen Therapy:  Pulse Oximetry: 96 %, O2 (LPM): 2, O2 Delivery: Silicone Nasal Cannula  Constitutional:  Confused, not communicative.  HENMT:  No injury  Eyes: No discharge.  Neck:  Unable to assess for JVD   Cardiovascular: + mechanical click. 3/6 systolic murmur at AV area. Extremities with no edema  Lungs:  +BS anteriorly  Abdomen: no distention  Skin: Warm  Neurologic: " Confused, not communicative.  Psychiatric: Confused, not communicative.      MDM (Data Review):     Records reviewed and summarized in current documentation    Lab Data Review:  Recent Results (from the past 24 hour(s))   Comp Metabolic Panel    Collection Time: 04/20/19 11:48 AM   Result Value Ref Range    Sodium 143 135 - 145 mmol/L    Potassium 4.3 3.6 - 5.5 mmol/L    Chloride 100 96 - 112 mmol/L    Co2 25 20 - 33 mmol/L    Anion Gap 18.0 (H) 0.0 - 11.9    Glucose 220 (H) 65 - 99 mg/dL    Bun 72 (HH) 8 - 22 mg/dL    Creatinine 9.34 (HH) 0.50 - 1.40 mg/dL    Calcium 10.9 (H) 8.5 - 10.5 mg/dL    AST(SGOT) 8 (L) 12 - 45 U/L    ALT(SGPT) <5 2 - 50 U/L    Alkaline Phosphatase 104 (H) 30 - 99 U/L    Total Bilirubin 0.5 0.1 - 1.5 mg/dL    Albumin 4.1 3.2 - 4.9 g/dL    Total Protein 8.1 6.0 - 8.2 g/dL    Globulin 4.0 (H) 1.9 - 3.5 g/dL    A-G Ratio 1.0 g/dL   ESTIMATED GFR    Collection Time: 04/20/19 11:48 AM   Result Value Ref Range    GFR If  7 (A) >60 mL/min/1.73 m 2    GFR If Non African American 6 (A) >60 mL/min/1.73 m 2   CBC WITH DIFFERENTIAL    Collection Time: 04/20/19 11:49 AM   Result Value Ref Range    WBC 10.8 4.8 - 10.8 K/uL    RBC 4.63 (L) 4.70 - 6.10 M/uL    Hemoglobin 14.3 14.0 - 18.0 g/dL    Hematocrit 43.3 42.0 - 52.0 %    MCV 93.5 81.4 - 97.8 fL    MCH 30.9 27.0 - 33.0 pg    MCHC 33.0 (L) 33.7 - 35.3 g/dL    RDW 44.5 35.9 - 50.0 fL    Platelet Count 136 (L) 164 - 446 K/uL    MPV 12.5 9.0 - 12.9 fL    Neutrophils-Polys 74.40 (H) 44.00 - 72.00 %    Lymphocytes 12.20 (L) 22.00 - 41.00 %    Monocytes 8.90 0.00 - 13.40 %    Eosinophils 3.70 0.00 - 6.90 %    Basophils 0.40 0.00 - 1.80 %    Immature Granulocytes 0.40 0.00 - 0.90 %    Nucleated RBC 0.00 /100 WBC    Neutrophils (Absolute) 8.07 (H) 1.82 - 7.42 K/uL    Lymphs (Absolute) 1.32 1.00 - 4.80 K/uL    Monos (Absolute) 0.96 (H) 0.00 - 0.85 K/uL    Eos (Absolute) 0.40 0.00 - 0.51 K/uL    Baso (Absolute) 0.04 0.00 - 0.12 K/uL    Immature  Granulocytes (abs) 0.04 0.00 - 0.11 K/uL    NRBC (Absolute) 0.00 K/uL   Prothrombin Time    Collection Time: 19 11:49 AM   Result Value Ref Range    PT 24.7 (H) 12.0 - 14.6 sec    INR 2.23 (H) 0.87 - 1.13   ACCU-CHEK GLUCOSE    Collection Time: 19 11:54 AM   Result Value Ref Range    Glucose - Accu-Ck 192 (H) 65 - 99 mg/dL   ACCU-CHEK GLUCOSE    Collection Time: 19  5:33 PM   Result Value Ref Range    Glucose - Accu-Ck 220 (H) 65 - 99 mg/dL   ACCU-CHEK GLUCOSE    Collection Time: 19  7:46 PM   Result Value Ref Range    Glucose - Accu-Ck 230 (H) 65 - 99 mg/dL   EKG    Collection Time: 19  7:56 PM   Result Value Ref Range    Report       [facility]    Test Date:  2019  Pt Name:    GHAZAL BENEDICT            Department: [facility]  MRN:        [id number]                      Room:       [facility]  Gender:     Male                         Technician: SARITHA  :        1950                   Requested By:MEJIA MURPHY  Order #:    [id number]                    Reading MD:    Measurements  Intervals                                Axis  Rate:       107                          P:          105  VA:         116                          QRS:        -64  QRSD:       191                          T:          125  QT:         440  QTc:        587    Interpretive Statements  VENTRICULAR-PACED RHYTHM  NO FURTHER ANALYSIS ATTEMPTED DUE TO PACED RHYTHM  BASELINE WANDER IN LEAD(S) V5  Compared to ECG 2019 11:18:01  No significant changes     ABG - LAB    Collection Time: 19  7:59 PM   Result Value Ref Range    Ph 7.48 7.40 - 7.50    Pco2 28.3 26.0 - 37.0 mmHg    Po2 80.4 64.0 - 87.0 mmHg    O2 Saturation 95.5 93.0 - 99.0 %    Hco3 21 17 - 25 mmol/L    Base Excess -1 -4 - 3 mmol/L    Body Temp see below Centigrade   Basic Metabolic Panel    Collection Time: 19  8:17 PM   Result Value Ref Range    Sodium 135 135 - 145 mmol/L    Potassium 4.6 3.6 - 5.5 mmol/L    Chloride 100 96 - 112  mmol/L    Co2 21 20 - 33 mmol/L    Glucose 232 (H) 65 - 99 mg/dL    Bun 56 (H) 8 - 22 mg/dL    Creatinine 8.01 (HH) 0.50 - 1.40 mg/dL    Calcium 11.2 (H) 8.5 - 10.5 mg/dL    Anion Gap 14.0 (H) 0.0 - 11.9   CBC WITH DIFFERENTIAL    Collection Time: 04/20/19  8:17 PM   Result Value Ref Range    WBC 12.6 (H) 4.8 - 10.8 K/uL    RBC 5.02 4.70 - 6.10 M/uL    Hemoglobin 15.4 14.0 - 18.0 g/dL    Hematocrit 46.7 42.0 - 52.0 %    MCV 93.0 81.4 - 97.8 fL    MCH 30.7 27.0 - 33.0 pg    MCHC 33.0 (L) 33.7 - 35.3 g/dL    RDW 45.1 35.9 - 50.0 fL    Platelet Count 173 164 - 446 K/uL    MPV 12.3 9.0 - 12.9 fL    Neutrophils-Polys 82.00 (H) 44.00 - 72.00 %    Lymphocytes 7.40 (L) 22.00 - 41.00 %    Monocytes 8.30 0.00 - 13.40 %    Eosinophils 1.30 0.00 - 6.90 %    Basophils 0.40 0.00 - 1.80 %    Immature Granulocytes 0.60 0.00 - 0.90 %    Nucleated RBC 0.00 /100 WBC    Neutrophils (Absolute) 10.35 (H) 1.82 - 7.42 K/uL    Lymphs (Absolute) 0.93 (L) 1.00 - 4.80 K/uL    Monos (Absolute) 1.05 (H) 0.00 - 0.85 K/uL    Eos (Absolute) 0.17 0.00 - 0.51 K/uL    Baso (Absolute) 0.05 0.00 - 0.12 K/uL    Immature Granulocytes (abs) 0.08 0.00 - 0.11 K/uL    NRBC (Absolute) 0.00 K/uL   ESTIMATED GFR    Collection Time: 04/20/19  8:17 PM   Result Value Ref Range    GFR If  8 (A) >60 mL/min/1.73 m 2    GFR If Non African American 7 (A) >60 mL/min/1.73 m 2   URINALYSIS    Collection Time: 04/20/19  8:21 PM   Result Value Ref Range    Color Yellow     Character Clear     Specific Gravity 1.040 <1.035    Ph 6.0 5.0 - 8.0    Glucose 500 (A) Negative mg/dL    Ketones Negative Negative mg/dL    Protein 300 (A) Negative mg/dL    Bilirubin Negative Negative    Urobilinogen, Urine 0.2 Negative    Nitrite Negative Negative    Leukocyte Esterase Negative Negative    Occult Blood Small (A) Negative    Micro Urine Req Microscopic    URINE MICROSCOPIC (W/UA)    Collection Time: 04/20/19  8:21 PM   Result Value Ref Range    WBC 2-5 (A) /hpf    RBC  10-20 (A) /hpf    Bacteria Negative None /hpf    Epithelial Cells Negative /hpf    Hyaline Cast 0-2 /lpf       Imaging/Procedures Review:    Chest Xray:  Reviewed    EKG:   As in HPI. Paced rhythm. I personally interpreted EKG.    MDM (Assessment and Plan):     Active Hospital Problems    Diagnosis   • CVA (cerebral vascular accident) (HCC) [I63.9]     Priority: High   • ESRD (end stage renal disease) (HCC) [N18.6]     Priority: High   • Chronic anticoagulation [Z79.01]     Priority: Medium   • Hypertension [I10]     Priority: Medium   • Diabetes (HCC) [E11.9]     Priority: Medium   • Hypothyroidism [E03.9]     Priority: Low   • History of mechanical fall without injury [W19.XXXA]     Priority: Low   • Hyperlipidemia [E78.5]     Priority: Low   • Hepatitis A [B15.9]   • Leukocytosis [D72.829]   • High anion gap metabolic acidosis [E87.2]   • Elevated troponin [R74.8]        Subarachnoid hemorrhage.      At this time, if subarachnoid hemorrhage is significant and will cause great risk to his overall survival and increasing mortality, then reversing INR would be advised.  However, patient will always have intrinsic risk of mechanical valve thrombosis without anticoagulation therapy.    Will continue to monitor closely.      Thank you for referring this patient to our cardiology service.  We will follow patient with you.      Addy Ladd MD.   Cardiology Inpatient Service.  [facility].  [phone].  [place], [place].

## 2019-04-21 NOTE — PROGRESS NOTES
Transfer note:  RN paged team for pt's mentation is getting worse after having HD today. Pt assessed at bedside. Pt was drowsy, sleepy, alert and oriented times 0. His SBP was in 80s. Per wife and RN, his previous Rt sided UE and LE weakness was getting worse. Pt is non verbal. Bedside GCS of 8. Pt was originally admitted for potential CVA work up which was negative. Called neurology Dr. Mio Dickinson who advised to order CTA head and result was positive for subarachnoid hemorrhage. Neurology advised to call neurosurgery and cardiology for managing both his acute SAH and being on warfarin for his mechanical AVR. Called neurosurgery Dr. Beni Jack who recommend to repeat CT head w/o 6 hours from last CT head to assess his SAH, advised to reverse his INR of 2.2. Called cardiology Dr. Ladd who was in favor of reversing his INR for now. Pt started on SAH protocol with goal of SBP < 140 on nicardipine infusion as well as nimodipine PO. Consulted critical pharmacy specialist and started on K-centra 25 U/kg and IV vit K once with f/u on PT/INR. Started on seizure PPx with keppra 1 gm IV once and bid 500 mg. Pt's placed on Unasyn for potential aspiration PNA? With elevated WBC and finding on CXR. Blood CX ordered.

## 2019-04-21 NOTE — PROGRESS NOTES
Critical Care Progress Note    Date of admission  4/13/2019    Chief Complaint  69 y.o. male admitted 4/13/2019 with altered mental status.    Hospital Course    This gentleman was admitted to the ICU with worsening respiratory failure, worsening mental status and a stroke associated with an intracranial hemorrhage.      Interval Problem Update  Reviewed last 24 hour events:      Lethargy  SAH  Coumadin reversed  Moans, does not follow  LUE purposeful - all other 3 extremities withdraws  Nicardipine 8  2 L NC  Nimodipine  PCC given    1600 hours:    Multiple interval reassessments.  He is unable to clear his secretions.  We are unable to adequately suction his secretions.  He requires intubation and mechanical ventilatory support.  I discussed this with the wife at the bedside and she agrees.      Review of Systems  Review of Systems   Unable to perform ROS: Acuity of condition        Vital Signs for last 24 hours   Temp:  [36 °C (96.8 °F)-36.3 °C (97.4 °F)] 36.3 °C (97.4 °F)  Pulse:  [] 91  Resp:  [10-19] 14  BP: ()/(32-72) 113/59  SpO2:  [87 %-100 %] 100 %    Hemodynamic parameters for last 24 hours       Respiratory Information for the last 24 hours       Physical Exam   Physical Exam   HENT:   Head: Normocephalic and atraumatic.   Right Ear: External ear normal.   Left Ear: External ear normal.   Nose: Nose normal.   Mouth/Throat: Oropharynx is clear and moist.   Eyes: Pupils are equal, round, and reactive to light. Conjunctivae are normal. Right eye exhibits no discharge. Left eye exhibits no discharge.   Neck: Normal range of motion. Neck supple. No JVD present. No tracheal deviation present.   Cardiovascular: Intact distal pulses.  Exam reveals no gallop.    Sinus rhythm.  Intermittently paced.   Pulmonary/Chest: He has no wheezes. He has rales (Significant coarse crackles bilaterally).   Abdominal: Soft. Bowel sounds are normal. He exhibits no distension. There is no tenderness. There is no  rebound.   Musculoskeletal: He exhibits no tenderness.   No clubbing or cyanosis   Neurological:   Very lethargic.  Difficult to arouse.  Global aphasia.  Minimal movement of the left arm.   Skin: Skin is warm and dry. No rash noted. He is not diaphoretic. No erythema.       Medications  Current Facility-Administered Medications   Medication Dose Route Frequency Provider Last Rate Last Dose   • levETIRAcetam (KEPPRA) tablet 500 mg  500 mg Oral BID Fercho Brantley M.D.       • famotidine (PEPCID) injection 20 mg  20 mg Intravenous DAILY Adithya Arroyo M.D.       • ampicillin/sulbactam (UNASYN) 3 g in  mL IVPB  3 g Intravenous QHS Yovani Bartlett M.D.   Stopped at 04/20/19 2305   • niCARdipine (CARDENE) 25 mg in  mL Infusion  0-15 mg/hr Intravenous Continuous Fercho Brantley M.D. 80 mL/hr at 04/21/19 0235 8 mg/hr at 04/21/19 0235   • Respiratory Care per Protocol   Nebulization Continuous RT Fercho Brantley M.D.       • ondansetron (ZOFRAN ODT) dispertab 4 mg  4 mg Oral Q4HRS PRN Fercho Brantley M.D.        Or   • ondansetron (ZOFRAN) syringe/vial injection 4 mg  4 mg Intravenous Q4HRS PRN Fercho Brantley M.D.       • acetaminophen (TYLENOL) tablet 650 mg  650 mg Oral Q4HRS PRN Fercho Brantley M.D.        Or   • acetaminophen (TYLENOL) suppository 650 mg  650 mg Rectal Q4HRS PRN Fercho Brantley M.D.       • niMODipine (NIMOTOP) capsule 60 mg  60 mg Oral Q4HRS Fercho Brantley M.D.   Stopped at 04/20/19 2336   • MD Alert...ICU Electrolyte Replacement per Pharmacy   Other PHARMACY TO DOSE Fercho Brantley M.D.       • amLODIPine (NORVASC) tablet 10 mg  10 mg Oral Q DAY Corby Ravi M.D.   Stopped at 04/20/19 0600   • insulin lispro (HUMALOG) injection 1-6 Units  1-6 Units Subcutaneous TID AC Corby Ravi M.D.   2 Units at 04/20/19 1749    And   • glucose 4 g chewable tablet 16 g  16 g Oral Q15 MIN PRN Corby Ravi M.D.        And   • dextrose 50% (D50W) injection 25 mL   25 mL Intravenous Q15 MIN PRN Corby Ravi M.D.       • senna-docusate (PERICOLACE or SENOKOT S) 8.6-50 MG per tablet 2 Tab  2 Tab Oral BID Niels Chase M.D.   Stopped at 04/19/19 1800    And   • polyethylene glycol/lytes (MIRALAX) PACKET 1 Packet  1 Packet Oral QDAY PRN Niels Chase M.D.        And   • magnesium hydroxide (MILK OF MAGNESIA) suspension 30 mL  30 mL Oral QDAY PRN Niels Chase M.D.        And   • bisacodyl (DULCOLAX) suppository 10 mg  10 mg Rectal QDAY PRN Niels Chase M.D.       • levothyroxine (SYNTHROID) tablet 125 mcg  125 mcg Oral QAM Niels Chase M.D.   Stopped at 04/20/19 0600   • cyanocobalamin (VITAMIN B-12) tablet 500 mcg  500 mcg Oral DAILY Niels Chase M.D.   Stopped at 04/20/19 0600   • [START ON 4/22/2019] ergocalciferol (CALCIFEROL) drops 50,000 Units  50,000 Units Oral Q7 DAYS Niels Chase M.D.       • calcitRIOL (ROCALTROL) capsule 0.5 mcg  0.5 mcg Oral Once per day on Mon Thu Niels Chase M.D.   0.5 mcg at 04/18/19 0532   • calcitRIOL (ROCALTROL) capsule 0.25 mcg  0.25 mcg Oral Once per day on Sun Tue Wed Fri Sat Niels Chase M.D.   Stopped at 04/20/19 0600   • atorvastatin (LIPITOR) tablet 40 mg  40 mg Oral Nightly Corby Rvai M.D.   Stopped at 04/19/19 2100   • lisinopril (PRINIVIL) tablet 5 mg  5 mg Oral Q DAY Corby Ravi M.D.   Stopped at 04/20/19 0600   • sevelamer carbonate (RENVELA) tablet 800 mg  800 mg Oral TID WITH MEALS Corby Ravi M.D.   Stopped at 04/19/19 1730   • insulin glargine (LANTUS) injection 10 Units  10 Units Subcutaneous Q EVENING Corby Ravi M.D.   10 Units at 04/20/19 1749   • menthol-methyl salicylate (BENGAY) ointment   Topical TID PRN Corby Ravi M.D.       • artificial tears 1.4 % ophthalmic solution 1 Drop  1 Drop Both Eyes 4X/DAY PRN Corby Ravi M.D.       • lidocaine (LIDODERM) 5 % 1 Patch  1 Patch Transdermal  Q24HRS Corby Ravi M.D.   Stopped at 04/14/19 0540       Fluids    Intake/Output Summary (Last 24 hours) at 04/21/19 0425  Last data filed at 04/20/19 2135   Gross per 24 hour   Intake              500 ml   Output             1202 ml   Net             -702 ml       Laboratory  Recent Labs      04/20/19 1959   RKHVW51L  7.48   [id number]  28.3   GDDFI544L  80.4   [id number]  95.5   ARTHCO3  21   ARTBE  -1         Recent Labs      04/19/19   0028  04/20/19   1148  04/20/19 2017   SODIUM  137  143  135   POTASSIUM  4.1  4.3  4.6   CHLORIDE  101  100  100   CO2  23  25  21   BUN  77*  72*  56*   CREATININE  9.48*  9.34*  8.01*   CALCIUM  10.1  10.9*  11.2*     Recent Labs      04/19/19   0028  04/20/19   1148  04/20/19 2017   ALTSGPT  <5  <5   --    ASTSGOT  10*  8*   --    ALKPHOSPHAT  102*  104*   --    TBILIRUBIN  0.4  0.5   --    GLUCOSE  227*  220*  232*     Recent Labs      04/19/19   0028  04/20/19   1148  04/20/19   1149 04/20/19 2017 04/21/19   0345   WBC   --    --   10.8  12.6*  10.7   NEUTSPOLYS   --    --   74.40*  82.00*  78.80*   LYMPHOCYTES   --    --   12.20*  7.40*  9.70*   MONOCYTES   --    --   8.90  8.30  8.80   EOSINOPHILS   --    --   3.70  1.30  1.70   BASOPHILS   --    --   0.40  0.40  0.50   ASTSGOT  10*  8*   --    --    --    ALTSGPT  <5  <5   --    --    --    ALKPHOSPHAT  102*  104*   --    --    --    TBILIRUBIN  0.4  0.5   --    --    --      Recent Labs      04/18/19   1633  04/19/19   0028  04/19/19   0635  04/20/19   1149  04/20/19 2017 04/21/19   0345   RBC   --    --    --   4.63*  5.02  4.27*   HEMOGLOBIN   --    --    --   14.3  15.4  13.1*   HEMATOCRIT   --    --    --   43.3  46.7  40.4*   PLATELETCT   --    --    --   136*  173  140*   PROTHROMBTM   --   25.1*   --   24.7*   --   17.5*   APTT  54.0*  90.8*  67.8*   --    --    --    INR   --   2.27*   --   2.23*   --   1.43*       Imaging  X-Ray:  I have personally reviewed the images and compared with  prior images. and My impression is: Prominent interstitium    Assessment/Plan  * Acute hypoxemic respiratory failure (HCC)   Assessment & Plan    Intubated for airway protection and inability to clear secretions on 4/21  I have initiated all the appropriate ventilator bundles  Full vent support     CVA (cerebral vascular accident) ([facility])- (present on admission)   Assessment & Plan    CTA on 4/20 with small focal filling defect in proximal left M2 branch with adjacent reconstitution  Subtle left posterior frontal and parietal subarachnoid hemorrhage - no need for surgical intervention per Dr. Jack  Anticoagulation with Coumadin reversed with PCC and vitamin K - post treatment TEG with platelet mapping shows no evidence of coagulopathy  Anticoagulants are strictly contraindicated  Hourly neuro checks  Strict blood pressure control with goal SBP less than 140  Continue nimodipine, 60 mg every 4 hours  Continue high intensity statin     Chronic systolic (congestive) heart failure ([facility])   Assessment & Plan    EF 45%     Aspiration pneumonia ([facility])   Assessment & Plan    Sputum culture  Continue Unasyn     Chronic anticoagulation- (present on admission)   Assessment & Plan    [place] mechanical aortic valve  Coumadin reversed with PCC and vitamin K  TEG with platelet mapping confirms successful reversal  Anticoagulants strictly contraindicated     Diabetes ([facility])- (present on admission)   Assessment & Plan    Monitor blood sugars  Goal glucose less than 180     Hypertension- (present on admission)   Assessment & Plan    Strict blood pressure control with goal SBP less than 140  I am titrating nicardipine drip to achieve blood pressure goals  Continue amlodipine, 10 mg daily     ESRD (end stage renal disease) ([facility])- (present on admission)   Assessment & Plan    Continue HD  Renal dose medications     H/O aortic valve replacement- (present on admission)   Assessment & Plan    [place] aortic valve  Echo with mean valve  gradient of 33 mmHg  Anticoagulation strictly contraindicated     Hypothyroidism- (present on admission)   Assessment & Plan    Continue levothyroxine, 125 mcg daily     Hyperlipidemia- (present on admission)   Assessment & Plan    Continue statin          VTE:  Heparin  Ulcer: H2 Antagonist  Lines: Central Line  Ongoing indication addressed and Leach Catheter  Ongoing indication addressed    I have performed a physical exam and reviewed and updated ROS and Plan today (4/21/2019). In review of yesterday's note (4/20/2019), there are no changes except as documented above.     I have assessed and reassessed his respiratory status and made ventilator adjustments based upon arterial blood gas analysis as well as analysis of ventilator waveforms and airway mechanics.  I have assessed and reassessed his blood pressure, hemodynamics, cardiovascular status with titration of nicardipine and neurologic status with titration of sedation.  He is at high risk for worsening respiratory, cardiovascular and CNS system dysfunction.    Discussed patient condition and risk of morbidity and/or mortality with Family, RN, RT, Pharmacy, [employer] [facility] resident, Charge nurse / hot rounds and QA team     The patient remains critically ill.  Critical care time = 45 minutes in directly providing and coordinating critical care and extensive data review.  No time overlap and excludes procedures.    The time spent is in addition to the time spent by Dr. Arroyo earlier today.    Neil Brothers MD  Pulmonary and Critical Care Medicine

## 2019-04-21 NOTE — ASSESSMENT & PLAN NOTE
Bleed date: 4/20   SBP goal prior to being secured: <140-160    Nimodipine 60mg Q4  Neuro checks w/  > 2 drop in GCS or NIHSS  Inc. > 2  Vasospasm watch: fever, neuro exam, urine output>200ml/hr, serum sodium  Maintain euvolemia, eunatremia, allow for permissive hypertension post repair  Seizure prophylaxis < 7days  Daily TCD and lindegaard ratio goal < 2.5    Location and history of fall on supra-therapeutic INR wether delay ICH thus -> traumatic SAH will need to discuss with Neurosurgery CTA negative for aneurysm but obviously patient is higher risk with his prior SAH s/p coiling in pass.     Will get repeat CT head in 6 hrs since CTA to allow contrast to come out.   Aspiration precaution  NPO

## 2019-04-21 NOTE — PROCEDURES
Date of Procedure:  4/21/2019    Title of Procedure:  Diagnostic and therapeutic flexible fiberoptic bronchoscopy with bronchoalveolar lavage    Indication for Procedure:   Atelectasis    Post-procedure Diagnoses:    1.  Normal endobronchial anatomy  2.  No endobronchial tumor identified  3.  Copious amounts of very thick white secretions seen bilaterally.  Occlusion of the left lower lobe and right lower lobe airways with these secretions.  All secretions suctioned until clear.    Narrative:    The patient was sedated, intubated and ventilated at the time of this procedure.  The flexible fiberoptic bronchoscope was inserted through the lumen of the endotracheal tube and advanced into the distal trachea without difficulty.  The airways were examined to the subsegmental bronchus level bilaterally.    The endobronchial anatomy was normal.  No tumor was identified.    There was copious amounts of very thick, juicy white secretions seen bilaterally.  There was occlusion of the left lower lobe and right lower lobe airways with these secretions.  All the secretions were suctioned until clear.    Bronchoalveolar lavage was carried out in the basilar segments of the left lower lobe using the standard technique with good fluid return.    Bronchoalveolar lavage fluid from the left lower lobe is submitted to the laboratory for cytology, gram stain, culture and sensitivity, acid fast bacilli smear and culture and fungal culture.    The patient tolerated the procedure quite nicely.  No complications were apparent.  The heart rate and rhythm, blood pressure and oxygenation saturation were continuously monitored.      Neil Brothers MD  Pulmonary and Critical Care Medicine

## 2019-04-21 NOTE — PROGRESS NOTES
Received report from dialysis RN Isaac, and assumed care when pt arrived to unit.  A&O to self.  Still sedated.  Not answering questions or responding to commands.  Bed alarm and treaded socks on.  Call light and personal belongings within reach.  Bed locked and at lowest position. VSS.  AMS still noted as prior to procedures.

## 2019-04-21 NOTE — PROGRESS NOTES
Called by Radiology his repeat CT scan if difficult to decifer since he is post contrast this could be all post contrast staining and enhancement. Recommend serial CT HEAD since patient unable to get MRI Q 24 to determine etiology. Reviewed extensively with radiology. Will need to discuss further with NSG and Neurology. Will continue that it is a SAH as he already got reversal and SBP control.     Repeat INR 1.43, , teg not back yet.     Patient remains in critical condition from SAH s/p Kcentra with rapid reversal and blood pressure control on nicardipine gtt. Critical care time provided was 40 minutes. This excludes all separate billable procedures.

## 2019-04-21 NOTE — PROGRESS NOTES
CNA and I are with patient at the [facility] CT room. Page sent to Dr. Bartlett for clarification on plan of care for patient    2117: patient is back in room.    2124: Page resent to unr again for clarification on plan of care for patient. Currently awaiting response.

## 2019-04-21 NOTE — PROGRESS NOTES
Cardiology Follow Up Progress Note    Date of Service  4/21/2019    Attending Physician  ARVIND Christianson*    Chief Complaint   History of mechanical valve placement and stroke and intracerebral bleed.    HPI  Efrain Martines is a 69 y.o. male admitted 4/13/2019 with above.    Interim Events  No significant changes noted from cardiac standpoint within the past 24 hours.    Review of Systems  Review of Systems   Unable to perform ROS: Mental status change       Vital signs in last 24 hours  Temp:  [36 °C (96.8 °F)-36.4 °C (97.6 °F)] 36.4 °C (97.6 °F)  Pulse:  [] 97  Resp:  [10-24] 24  BP: ()/(32-72) 113/59  SpO2:  [87 %-100 %] 94 %    Physical Exam  Physical Exam   Constitutional: He is oriented to person, place, and time. He appears well-developed and well-nourished.   HENT:   Head: Normocephalic and atraumatic.   Eyes: Pupils are equal, round, and reactive to light. Conjunctivae are normal.   Neck: Normal range of motion. Neck supple.   Cardiovascular: Normal rate and regular rhythm.    Normal mechanical click.   Pulmonary/Chest: Effort normal and breath sounds normal.   Abdominal: Soft. Bowel sounds are normal.   Musculoskeletal: Normal range of motion. He exhibits no edema.   Neurological: He is alert and oriented to person, place, and time.   Skin: Skin is warm and dry.   Psychiatric: He has a normal mood and affect.       Lab Review  Lab Results   Component Value Date/Time    WBC 10.7 04/21/2019 03:45 AM    RBC 4.27 (L) 04/21/2019 03:45 AM    HEMOGLOBIN 13.1 (L) 04/21/2019 03:45 AM    HEMATOCRIT 40.4 (L) 04/21/2019 03:45 AM    MCV 94.6 04/21/2019 03:45 AM    MCH 30.7 04/21/2019 03:45 AM    MCHC 32.4 (L) 04/21/2019 03:45 AM    MPV 12.4 04/21/2019 03:45 AM      Lab Results   Component Value Date/Time    SODIUM 143 04/21/2019 03:45 AM    POTASSIUM 5.0 04/21/2019 03:45 AM    CHLORIDE 105 04/21/2019 03:45 AM    CO2 23 04/21/2019 03:45 AM    GLUCOSE 191 (H) 04/21/2019 03:45 AM    BUN 55 (H)  04/21/2019 03:45 AM    CREATININE 8.71 (HH) 04/21/2019 03:45 AM      Lab Results   Component Value Date/Time    ASTSGOT 9 (L) 04/21/2019 03:45 AM    ALTSGPT <5 04/21/2019 03:45 AM     Lab Results   Component Value Date/Time    CHOLSTRLTOT 103 04/21/2019 03:45 AM    LDL 27 04/21/2019 03:45 AM    HDL 21 (A) 04/21/2019 03:45 AM    TRIGLYCERIDE 275 (H) 04/21/2019 03:45 AM    TROPONINI 0.82 (H) 04/13/2019 08:05 AM   (Above labs reviewed.)           Cardiac Imaging and Procedures Review  CARDIAC STUDIES/PROCEDURES:    CT OF HEAD (04/21/19)  1.  Grey matter hyperdensity of the left frontal and parietal lobes, appearance favors post contrast staining of ischemia.  2.  Hyperdensity in the sulci of the left frontal and parietal lobes, increased since prior study, concerning for increasing hemorrhage. Given recent contrast administration, could represent enhancement of the meninges.  3.  Changes of atrophy and nonspecific white matter changes commonly associated with small vessel ischemia.  (study result reviewed)    ECHOCARDIOGRAM CONCLUSIONS (01/30/18)  Left ventricular ejection fraction is visually estimated to be 45%.   Hypokinetic apex. Abnormal septal motion consistent with ventricular   pacing. Per endocardial definition if clinically indicated consider   limited echo with contrast.  Pacer/ICD wire seen in right ventricle. Moderately dilated right ventricle.  Mild to moderate mitral regurgitation.   Mechanical aortic valve with a mean gradient of 33 mmHg.  Aortic insufficiency present difficult to assess severity  Right ventricular systolic pressure is estimated to be 55 mmHg.  No prior study is available for comparison.   (study result reviewed)    EKG performed on (04/20/19) was reviewed: EKG shows paced rhythm.    Assessment/Plan    1. History of surgical aortic valve replacement: The prosthetic valve is functioning well. Reinstate anticoagulation when allowed by neurology.  2. Cerebrovascular accident with  intracerebral hemmorhage.    Thank you for allowing me to participate in the care of this patient.  I will continue to follow this patient    Please contact me with any questions.    Jered Bowman M.D.   Cardiologist, [facility]  [phone]

## 2019-04-21 NOTE — ASSESSMENT & PLAN NOTE
Continue HD  Renal dose medications  Avoid nephrotoxins  Judicious fluid management  Serial BMP and volume assessment, daily

## 2019-04-21 NOTE — PROGRESS NOTES
PROGRESS NOTE    I was contacted at approximately 8 PM by nursing staff due to patient continued worsening altered mental status following dialysis at 4 PM.  Per nursing report, the patient returned for dialysis at approximately 4 PM and was answering questions and following commands.  Over the course of the evening the patient mental status became altered and obtunded.  The patient's Melfa score was 8 and was not following commands and was only acting directly to pain to sternal chest rub.  Initial evaluation, showed possible encephalopathy status post hemodialysis.    Stat labs were ordered including CMP, CBC, troponin, EKG.  Evaluation of the labs was delayed due to closure of the chemistry lab due to failure of the water.  Neurology was also consulted at that time, and CTA head and CT head perfusion with RAPID protocol was ordered.  Chest x-ray showed possible right-sided infiltrate worsening from previous chest x-ray.  Urinalysis was also taken.  ABG was also ordered.  Unasyn was started for possible aspiration pneumonia given elevated white blood cell count, encephalopathy and increasing white count    At 9:21 PM, I was contacted by radiology with results of the CT cerebral perfusion study which showed a small focal filling defect in the proximal left M2 branch compatible with focal occlusion with adjacent reconstitution.  Also noted were a subtle left posterior frontal and parietal subarachnoid hemorrhage new from previous imaging completed on April 19, 2019 concerning for possible hemorrhagic conversion of ischemic infarct.  Neurology was notified of the result the patient's INR was 2.3 and the patient's INR was reversed with vitamin K and 4 factor PCC.  The patient's warfarin and aspirin was discontinued.  Additionally the patient was placed on nimodipine drip and the ICU was consulted for possible transfer.    Dr. Colon accepted the patient and the patient was transferred to the ICU.    Plan  1.  Stat  CTA head and CT perfusion scan  2.  Neurology on call Dr. Dickinson was consulted within 30 minutes of initial call following assessment and workup for new onset encephalopathy.  2.  Stat labs CMP, CBC, troponin, EKG, ABG, UA  3.  Patient received 4 factor PCC and vitamin K to reverse INR  4.  Start nimodipine drip  5.  Discontinue aspirin, warfarin  6. Unasyn was started for possible aspiration pneumonia given elevated white blood cell count, encephalopathy and increasing white count, will defer to primary team to de-escalate therapy  7.  ICU, Dr. Colon, was contacted and accepted the patient for transfer for new onset subarachnoid hemorrhage.  8.  Per Radiology, patient must get dialyzed status post CTA contrast today.  Will defer to primary team to dialyze patient in the setting of CAT a contrast and AKA on chronic CKD.

## 2019-04-21 NOTE — PROGRESS NOTES
Resident from [facility] was paged and made aware of the patient's recent neuro status (see latest neuro check in flow sheet). MD stated he was on his way to see the patient.

## 2019-04-21 NOTE — CONSULTS
DATE OF SERVICE:  04/21/2019    INPATIENT CONSULTATION    CHIEF COMPLAINT:  Mental status changes, left MCA stroke.    HISTORY OF PRESENT ILLNESS:  This is a 69-year-old right-handed man who has a   history of end-stage kidney disease, on dialysis, previous stroke with   residual right-sided, previous weakness, heart block with pacemaker, came in   with stroke-like symptoms 8 days ago.  Yesterday, he had an increase in   symptoms, becoming more aphasic.  His CAT scan showed minimal left MCA   posterior distribution subarachnoid hemorrhage.  The repeat CAT scan shows an   infarct in the left MCA territory, not complete.  There is no significant   midline shift.  At this time he does have some cerebral atrophy.    PAST MEDICAL HISTORY:  Extensive and as listed above plus diabetes,   hyperlipidemia, hypertension, hepatitis C, hypothyroidism, obstructive sleep   apnea.  His Coumadin was reversed with aortic valve replacement down from   2.2-1.4 last night.  His repeat CAT scan shows the perfusion issue, stroke   neurology is involved.    PAST SURGICAL HISTORY:  Includes pacemaker, AV fistula, aortic valve.    SOCIAL HISTORY:  He lives at home with his wife who is at his bedside.    PHYSICAL EXAMINATION:  NEUROLOGIC:  He has a left-sided gaze preference and mild right neglect.  He   has a right upper extremity hemiparesis.  The right lower extremity moves a   little bit.  He has a right lower facial paralysis.  He is aphasic.  He   grunts.  His eyes are open.  He does not follow commands, very purposeful on   the left side.    ASSESSMENT AND PLAN:  The patient had multiple medical problems, Coumadin   reversed with minimal hemorrhagic transformation and left MCA stroke.  He is   on the stroke neurologist care.  Recommend blood pressure less than 160, hold   Coumadin for at least 5-7 days with the risks associated with that.  I am   available for hemicraniectomy, but I discussed with the family about his   quality of life  given his multiple medical problems this will be strictly be a   life saving procedure.    Thanks for allowing us to participate in his care.  He does not that procedure   at this time.       ____________________________________     MD GODWIN Alonso III / SALVATORE    DD:  04/21/2019 07:50:56  DT:  04/21/2019 12:14:03    D#:  [id number]  Job#:  [id number]

## 2019-04-21 NOTE — ASSESSMENT & PLAN NOTE
CTA on 4/20 with small focal filling defect in proximal left M2 branch with adjacent reconstitution  Subtle left posterior frontal and parietal subarachnoid hemorrhage - no need for surgical intervention per Dr. Jack  Anticoagulation with Coumadin reversed with PCC and vitamin K - post treatment TEG with platelet mapping shows no evidence of coagulopathy  Anticoagulants are strictly contraindicated for now  Hourly neuro checks ongoing  Strict blood pressure control with goal SBP less than 140  Discontinue nimodipine  Continue high intensity statin  PT OT when clinically appropriate  Limit sedation  Intubated 4/21 to protect the airway  Resume anticoagulation for mechanical heart valve when okay with neurology/neurosurgery, weight perhaps 1-2 weeks, repeat CT at that time

## 2019-04-21 NOTE — PROGRESS NOTES
[facility] ICU Progress Note      Admit Date: 4/13/2019  ICU Day: 1    Resident(s): Aure Seals  Attending: CANDICE MOELLER/ Dr. Roberts     Date & Time:   4/21/2019   10:26 AM       Patient ID:    Name:             Efrain Martines     YOB: 1950  Age:                 69 y.o.  male   MRN:               [id number]    Diagnosis:  Cerebrovascular accident with intracerebral hemmorhage.    HPI:  69 y.o. male who presented 4/13/2019 with ESRD, CVA, DM, hypothyroidism, dyslipidemia, mechanical st heather valve on chronic anticoagulation, pace maker for 3rd degree block, prior cerebral aneurysm s/p coiling in 1990's. Patient was admitted 4/13 for fall and AMS and supra-therapeutic INR 4.8 and his intial images were negative for acute stroke or findings but neurology thought likely new cardio embolic stroke. He was unable to get MRI. Today he had ams and concerns for sepsis he was given fluids for hypotension and a ct head was preformed that showed new SAH. Discussed with neurology, neurosurgery and cardiology and transferred to ICU for monitoring.     Consultants:  [facility]  Neurology   Neurosurgery   Cardiology     Interval Events:  Patient transferred to ICU overnight due to Stroke and SAH   Patient awake, moves left upper extremity. Right side withdraw with painful stimuli   CTA showed:  Subtle left posterior frontal and parietal subarachnoid hemorrhage  Neurosurgery and Neurology were consulted   There was suspicion of possible aspiration and patient was started on Unasyn   Discussed with patient's wife imaging findings and plan of care.   Extensive discussion with patients wife about patient's condition and code status. Wife reported that patient will like to continue to be Full code.   Palliative consulted for goals of care discussion     ROS: unable to assess due to mental status change     PHYSICAL EXAM  Gen: Awake, ill appearing. Not following commands   HEENT: NC/AT, no scleral icterus, Right  IJ dialysis catheter   Eyes: Pupils reactive to light. No corneal reflex on right side   Neck: Supple  Cardiac: S1S2, RRR, no m  Respiratory:no wheezing or crackles, poor cough   Abdomen: BS+, soft, NT/ND, no rebound/guarding  Ext: No edema, 2+ DP pulses b/l.  Skin: Warm, dry.    Neuro: Awake, not following commands. Right eye with no corneal reflex. Right arm minimal withdrawal to pain, spontaneously moves left arm and lower extremities   Psych: unable to assess   Respiratory:     Respiration: 13, Pulse Oximetry: 97 %    Chest Tube Drains:    Recent Labs      04/20/19 1959   [id number]  7.48   [id number]  28.3   [id number]  80.4   [id number]  95.5   ARTHCO3  21   ARTBE  -1       HemoDynamics:  Pulse: 86, Heart Rate (Monitored): 84 Blood Pressure : 113/59, NIBP: 129/75      Neuro:      Fluids:        Intake/Output Summary (Last 24 hours) at 04/21/19 1026  Last data filed at 04/20/19 2135   Gross per 24 hour   Intake              500 ml   Output             1540 ml   Net            -1040 ml       Weight: 82.8 kg (182 lb 8.7 oz)  Body mass index is 25.47 kg/m².    Recent Labs      04/20/19   1148  04/20/19 2017 04/21/19   0345   SODIUM  143  135  143   POTASSIUM  4.3  4.6  5.0   CHLORIDE  100  100  105   CO2  25  21  23   BUN  72*  56*  55*   CREATININE  9.34*  8.01*  8.71*   MAGNESIUM   --    --   2.0   PHOSPHORUS   --    --   7.9*   CALCIUM  10.9*  11.2*  10.0       GI/Nutrition:  Recent Labs      04/19/19   0028  04/20/19   1148  04/20/19 2017 04/21/19   0345   ALTSGPT  <5  <5   --   <5   ASTSGOT  10*  8*   --   9*   ALKPHOSPHAT  102*  104*   --   93   TBILIRUBIN  0.4  0.5   --   0.5   GLUCOSE  227*  220*  232*  191*       Heme:  Recent Labs      04/18/19   1633  04/19/19   0028  04/19/19   0635  04/20/19   1149  04/20/19 2017 04/21/19   0345   RBC   --    --    --   4.63*  5.02  4.27*   HEMOGLOBIN   --    --    --   14.3  15.4  13.1*   HEMATOCRIT   --    --    --   43.3  46.7  40.4*   PLATELETCT   --    --     --   136*  173  140*   PROTHROMBTM   --   25.1*   --   24.7*   --   17.5*   APTT  54.0*  90.8*  67.8*   --    --    --    INR   --   2.27*   --   2.23*   --   1.43*       Infectious Disease:  Temp  Av.2 °C (97.1 °F)  Min: 36 °C (96.8 °F)  Max: 36.4 °C (97.6 °F)  Recent Labs      19   0028  19   1148  19   1149  19   0345   WBC   --    --   10.8  12.6*  10.7   NEUTSPOLYS   --    --   74.40*  82.00*  78.80*   LYMPHOCYTES   --    --   12.20*  7.40*  9.70*   MONOCYTES   --    --   8.90  8.30  8.80   EOSINOPHILS   --    --   3.70  1.30  1.70   BASOPHILS   --    --   0.40  0.40  0.50   ASTSGOT  10*  8*   --    --   9*   ALTSGPT  <5  <5   --    --   <5   ALKPHOSPHAT  102*  104*   --    --   93   TBILIRUBIN  0.4  0.5   --    --   0.5       Meds:  • levETIRAcetam  500 mg     • famotidine  20 mg     • ampicillin-sulbactam (UNASYN) IV  3 g     • niCARdipine infusion  0-15 mg/hr 12 mg/hr (19 1003)   • Respiratory Care per Protocol       • ondansetron  4 mg      Or   • ondansetron  4 mg     • acetaminophen  650 mg      Or   • acetaminophen  650 mg     • niMODipine  60 mg     • amLODIPine  10 mg     • insulin lispro  1-6 Units      And   • glucose  16 g      And   • dextrose 50%  25 mL     • senna-docusate  2 Tab      And   • polyethylene glycol/lytes  1 Packet      And   • magnesium hydroxide  30 mL      And   • bisacodyl  10 mg     • levothyroxine  125 mcg     • cyanocobalamin  500 mcg     • [START ON 2019] ergocalciferol  50,000 Units     • calcitRIOL  0.5 mcg     • calcitRIOL  0.25 mcg     • atorvastatin  40 mg     • sevelamer carbonate  800 mg     • insulin glargine  10 Units     • menthol-methyl salicylate       • artificial tears  1 Drop     • lidocaine  1 Patch          Problem and Plan:  * CVA (cerebral vascular accident) ([facility])- (present on admission)  SAH (subarachnoid hemorrhage) ([facility])   Assessment & Plan     Patient decreased mentation overnight. Also worsening  neuro exam.   CTA was done and showed Subtle left posterior frontal and parietal subarachnoid hemorrhage. Patient transferred to ICU.    Hold anticoagulation now since now with acute SAH.    Neurology and Neurosurgery following - appreciate recs   Plan   - continue neuro checks   - Nimodipine 60mg Q4H   - Nicardipine drip to keep SBP less than 140   - Seizure/aspiration/fall precautions   - Keppra 500mg BID for seizures   - Neurosurgery recommendations       ESRD (end stage renal disease) (HCC)- (present on admission)   Assessment & Plan     Nephrology following has a peritoneal catheter and right IJ dialysis catheter appreciate nephrology recommendation   Renal dose medication   Avoid nephrotoxins.       Chronic anticoagulation- (present on admission)   Assessment & Plan     History of mechanical aortic valve on warfarin   Received [facility] 4/20 for acute SAH with elevated INR to 2.2  Vit k  Discuss with neurosurgery timing of restarting.   Cardiology following    Acute on chronic systolic heart failure (HCC)- (present on admission)   Assessment & Plan     EF 45%   Moderate RV dysfunction, RVSP 55  BP control and monitoring.       Diabetes (HCC)- (present on admission)   Assessment & Plan     BS goal < 180  Hypoglycemic protocols      Hypertension- (present on admission)   Assessment & Plan     Will control with nicardipine for SBP < 140       Leukocytosis- (present on admission)   Assessment & Plan     Continue to monitor   On Unasyn for possible aspiration       Complete heart block (HCC)- (present on admission)   Assessment & Plan     Pacemaker continue to monitor on telemetry      History of mechanical fall without injury- (present on admission)   Assessment & Plan     Likely related to CVA continue to monitor, fall precautions, PT/OT rehab.       Hypothyroidism- (present on admission)   Assessment & Plan     Continue synthyroid      Hyperlipidemia- (present on admission)   Assessment & Plan     Statin when  stable          DISPO: ICU     CODE STATUS: Full code     Quality Measures:  Leach Catheter: +  DVT Prophylaxis: SCD  Ulcer Prophylaxis: none   Antibiotics: Unasyn       Imaging:  CT-HEAD W/O   Final Result         1.  Grey matter hyperdensity of the left frontal and parietal lobes, appearance favors post contrast staining of ischemia.   2.  Hyperdensity in the sulci of the left frontal and parietal lobes, increased since prior study, concerning for increasing hemorrhage. Given recent contrast administration, could represent enhancement of the meninges.   3.  Changes of atrophy and nonspecific white matter changes commonly associated with small vessel ischemia.      These findings were discussed with the patient's clinician, Dr. Redmond, on 4/21/2019 3:38 AM.      DX-CHEST-PORTABLE (1 VIEW)   Final Result         1.  Pulmonary edema and/or infiltrates are identified, which are stable since the prior exam.   2.  Cardiomegaly   3.  Atherosclerosis      CT-CTA HEAD WITH & W/O-POST PROCESS   Final Result         1.  Small focal filling defect in proximal left M2 branch, compatible with point focal occlusion. There is adjacent reconstitution.   2.  Distal vertebral artery and bilateral intracranial internal carotid artery atherosclerosis results in less than 50% stenosis.   3.  No aneurysm identified. Examination limited as described.   4.  Subtle left posterior frontal and parietal subarachnoid hemorrhage      These findings were discussed with the patient's clinician, Yovani Bartlett, on 4/20/2019 9:46 PM.      CT-CEREBRAL PERFUSION ANALYSIS   Final Result      1.  Cerebral blood flow less than 30% likely representing completed infarct = 0 mL.      2.  T Max more than 6 seconds likely representing combination of completed infarct and ischemia = 0 mL.      3.  Mismatched volume likely representing ischemic brain/penumbra = None      4.  Please note that the cerebral perfusion was performed on the limited brain tissue around  the basal ganglia region. Infarct/ischemia outside the CT perfusion sections can be missed in this study.      DX-CHEST-LIMITED (1 VIEW)   Final Result         1.  Pulmonary edema and/or infiltrates are identified, which are somewhat decreased since the prior exam.   2.  Cardiomegaly   3.  Atherosclerosis      IR-CVC NON TUNNELED > AGE 5   Final Result      1. ULTRASOUND AND FLUOROSCOPIC GUIDED PLACEMENT OF A RIGHT INTERNAL JUGULAR 12 [place] TRIALYSIS TRIPLE LUMEN NON-TUNNELED HEMODIALYSIS CATHETER.      2. THE HEMODIALYSIS CATHETER MAY BE USED IMMEDIATELY AS CLINICALLY INDICATED. FLUSHES PER PROTOCOL.      CT-CTA HEAD WITH & W/O-POST PROCESS   Final Result      1.  Prior anterior communicating artery aneurysm coiling.  No evidence for recurrent aneurysm.   2.  No focal high-grade cerebral artery stenosis or occlusion.         CT-CEREBRAL PERFUSION ANALYSIS   Final Result      1.  Cerebral blood flow less than 30% likely representing completed infarct = 0 mL.      2.  T Max more than 6 seconds likely representing combination of completed infarct and ischemia = 9 mL.      3.  Mismatched volume likely representing ischemic brain/penumbra = 9      4.  Please note that the cerebral perfusion was performed on the limited brain tissue around the basal ganglia region. Infarct/ischemia outside the CT perfusion sections can be missed in this study.      IR-US GUIDED PIV   Final Result    Ultrasound-guided PERIPHERAL IV INSERTION performed by    qualified nursing staff as above.            CT-HEAD W/O   Final Result      No acute intracranial abnormality      CT-HEAD W/O   Final Result      1.  No evidence of acute intracranial process.      2.  Cerebral atrophy as well as periventricular chronic small vessel ischemic change.      3.  Again seen metallic structure within the supraclinoid region.      4.  Small focus of encephalomalacia involving the left frontal lobe, unchanged.      DX-ABDOMEN FOR TUBE PLACEMENT   Final  Result      Cortrak nasogastric tube position consistent with intragastric placement.      CT-CTA NECK WITH & W/O-POST PROCESSING   Final Result      Unremarkable CT angiogram of the neck. No high-grade stenosis, large vessel occlusion, aneurysm or dissection.      CT-CTA HEAD WITH & W/O-POST PROCESS   Final Result      1.  No large vessel occlusion, high-grade stenosis or aneurysm of the [place] of [place].   2.  Suprasellar aneurysm coiling.      CT-RENAL COLIC EVALUATION(A/P W/O)   Final Result         1. No acute abnormality in the abdomen or pelvis.   2. Absent left kidney. Unremarkable right kidney. No hydronephrosis.   3. Bibasilar atelectasis/scarring. No pleural effusions.      CT-CEREBRAL PERFUSION ANALYSIS   Final Result      1.  Cerebral blood flow less than 30% likely representing completed infarct = 0 mL.      2.  T Max more than 6 seconds likely representing combination of completed infarct and ischemia = 0 mL.      3.  Mismatched volume likely representing ischemic brain/penumbra = 0 mL.      4.  Please note that the cerebral perfusion was performed on the limited brain tissue around the basal ganglia region. Infarct/ischemia outside the CT perfusion sections can be missed in this study.      CT-HEAD W/O   Final Result      1.  No CT evidence of acute infarct, hemorrhage or mass.   2.  Small to moderate global parenchymal atrophy and chronic small vessel ischemic changes.      DX-CHEST-PORTABLE (1 VIEW)   Final Result         1. Nonspecific bilateral interstitial and alveolar opacities, likely mild edema. No significant pleural effusions.   2. Mild cardiomegaly.

## 2019-04-21 NOTE — PROGRESS NOTES
Patient is with Amanda AYON heading to [facility]. Report was given to Amanda AYON 10m ago

## 2019-04-21 NOTE — PROGRESS NOTES
Palliative Care Advance Care Planning Progress Note    Attempted Palliative Care consult.  Wife, Ameena in room with 2 of her family members.  Introduced myself and advised wife that primary team had requested our team to consult.  Before I could explain purpose of PC, wife immediately declined consult at this time, indicating it is a holiday and she would prefer not to discuss matters today, while she has family visiting.  She requested myself (or team) [place] back tomorrow or the next day.    Of note, PC RN Jennifer Adam submitted faxed request for advance directive documents to [facility] this morning @ 08:08.  Pending response.    RADHA Mujica.  Palliative Care Nurse Practitioner  [phone]

## 2019-04-21 NOTE — PALLIATIVE CARE
Palliative Care follow-up  Consult received and EMR reviewed; [facility]rain [facility] patient of [facility]. SW assessment notes the pt has a living will/DPOA; nothing scanned into the EMR. [facility] to the [facility] for and AD/POLST. Formal consult to follow.    Thank you for allowing Palliative Care to participate in this patient's care. Please feel free to call [phone] with any questions or concerns.

## 2019-04-21 NOTE — PROGRESS NOTES
Review of systems:  Neuro: localizes to pain LUE, w/d RUE and BLE, mute, groans to painful stimulus, opens eyes spontaneously and to pain, weak gag, weak cough, neg R corneal, pos L corneal    Cardiac: paced, tachy low 100's, nicardipine gtt @ 8mg/hr not requiring much titration. BP cuff is on R thigh, however d/t old RUE fistula and LUE PIV. Afebrile      Resp: very coarse throughout, NT suction x 2 overnight, not able to cough significantly enough to clear secretions fully    GI: NPO     : anuric/olgiuric     Skin: no pressure ulcers noted, BLE are discolored d/t PVD    Acute overnight events: transferred from neuro floor 0015 d/t needing higher level of care for neuro checks and nicardipine gtt    Patient primary concern: n/a    Family primary concern: wants to know results of most recent CT scan, at bedside

## 2019-04-21 NOTE — PROGRESS NOTES
[facility] Nephrology Daily Progress Note    Date of Service  4/21/2019    Chief Complaint  69 y.o. male admitted 4/13/2019 with expressive aphasia.  Found by wife on floor unresponsive in pool of urine and feces with PD catheter uncapped.     Interval Problem Update  4/13/19 - Renal consult.  Ordered PD.  Sent PD fluid for cell count, gram stain, and culture because of possible contamination prior to admission.  4/14/19 - Feels fine.  Speech is now ~normal.   4/15/19 - Feels ok. CCPD is going well. Net UF is 200 mL.  4/16/19 - Pt says he feels fine. Denies any complaints/concerns. Peritoneal fluid negative. Started on dysphagia diet.  4/17/19 - No new overnight renal events. Repeat CT did not show any new stroke. Low drain volume alarm. Patient was repositioned without success. PD cath was aseptically flushed with NS 10 cc. No resistant was felt instiling and withdrawing the NS solution. Patient has no abdominal discomfort.  4/18/19 - No issues with CCPD. Net  mL. Pt denies complaints/concerns. Says he will be going to rehab.  4/19/19 - Disconnected from CCPD this AM 0745, Net UF = 1814, pt is globally aphasic with worsening L sided facial droop, was supposed to tx to rehab, STAT head CT pending   4/20/19 - low UF with PD yesterday, neurology expressed concern regarding BUN given neuro changes, will plan for HD x2-3 days, awaiting line placement  4/21/19 - tolerated HD yesterday, developed neuro changes approx 1930 yesterday, imaging obtained and neurology and cardiology contacted, SAH on imaging, transferred to ICU and anticoag reversed, on nicardipine drip, wife at bedside    Review of Systems  Review of Systems   Unable to perform ROS: Acuity of condition        Physical Exam  Temp:  [36 °C (96.8 °F)-36.4 °C (97.6 °F)] 36.4 °C (97.6 °F)  Pulse:  [] 86  Resp:  [10-24] 13  BP: ()/(32-72) 113/59  SpO2:  [87 %-100 %] 97 %    Physical Exam   Constitutional: He appears well-developed and  well-nourished.   HENT:   Head: Normocephalic and atraumatic.   Eyes: Conjunctivae are normal.   Minimally opens eyes   Neck: No tracheal deviation present.   Cardiovascular: Normal rate and regular rhythm.    Pulmonary/Chest: Effort normal and breath sounds normal.   Abdominal: Soft.   PD catheter in place. CDI.   Musculoskeletal: He exhibits no edema or tenderness.   Neurological:   Minimally opens eyes, does not make eye contact, moves left arm easily to adjust blanket   Skin: Skin is warm and dry.       Fluids    Intake/Output Summary (Last 24 hours) at 04/21/19 0849  Last data filed at 04/20/19 2135   Gross per 24 hour   Intake              500 ml   Output             1202 ml   Net             -702 ml       Laboratory  Recent Labs      04/20/19   1149  04/20/19 2017 04/21/19 0345   WBC  10.8  12.6*  10.7   RBC  4.63*  5.02  4.27*   HEMOGLOBIN  14.3  15.4  13.1*   HEMATOCRIT  43.3  46.7  40.4*   MCV  93.5  93.0  94.6   MCH  30.9  30.7  30.7   MCHC  33.0*  33.0*  32.4*   RDW  44.5  45.1  45.7   PLATELETCT  136*  173  140*   MPV  12.5  12.3  12.4     Recent Labs      04/20/19   1148  04/20/19 2017 04/21/19   0345   SODIUM  143  135  143   POTASSIUM  4.3  4.6  5.0   CHLORIDE  100  100  105   CO2  25  21  23   GLUCOSE  220*  232*  191*   BUN  72*  56*  55*   CREATININE  9.34*  8.01*  8.71*   CALCIUM  10.9*  11.2*  10.0     Recent Labs      04/18/19   1633  04/19/19   0028  04/19/19   0635  04/20/19   1149  04/21/19   0345   APTT  54.0*  90.8*  67.8*   --    --    INR   --   2.27*   --   2.23*  1.43*         Recent Labs      04/21/19   0345   TRIGLYCERIDE  275*   HDL  21*   LDL  27       Imaging  reviewed      Assessment/Plan        ESRD on CCPD. Tolerated HD yesterday. Will plan to hold PD and HD today, reassess tomorrow. Daily labs and  weights, renally dose medications     CVA. SAH. Imaging reviewed. INR reversed, on nicardipine drip. Defer to neurology and cardiology. Wife at bedside. Palliative also  consulted     DM. Per primary service     HTN. PO meds held, on nicardipine drip     CKD MBD: currently NPO, MBD meds held. Phos elevated.       AV replacement: INR reversed, defer anticoagulation plan to neurology and cardiology     Anemia of CKD: Hgb at goal, no MUNA          Other management per primary service

## 2019-04-21 NOTE — PROGRESS NOTES
Dr. Brantley called back and was notified that prior PO meds, statin and coumadin, were held per Dr. Hammond's strict NPO order. Dr. Brantley verbalized understanding. Dr. Brantley was also asked if patient will get hemodialysis tomorrow as patient received contrast for CT. Dr. brantley stated the morning team will contact nephrology to get orders for dialysis. New orders received:    Do a PT and INR for pt.    D/C mert

## 2019-04-21 NOTE — ASSESSMENT & PLAN NOTE
Blood sugar trending up despite SSI  Initiate insulin 180 protocols with continuous infusion of insulin  Hypoglycemia protocols as needed

## 2019-04-21 NOTE — CONSULTS
Critical Care Consultation    Date of consult: 4/20/2019    Referring Physician  Niels Chase M.D.    Reason for Consultation  AMS with SAH     History of Presenting Illness  69 y.o. male who presented 4/13/2019 with ESRD, CVA, DM, hypothyroidism, dyslipidemia, mechanical st heather valve on chronic anticoagulation, pace maker for 3rd degree block, prior cerebral aneurysm s/p coiling in 1990's. Patient was admitted 4/13 for fall and AMS and supra-therapeutic INR 4.8 and his intial images were negative for acute stroke or findings but neurology thought likely new cardio embolic stroke. He was unable to get MRI. Today he had ams and concerns for sepsis he was given fluids for hypotension and a ct head was preformed that showed new SAH. Discussed with neurology, neurosurgery and cardiology and transferred to ICU for monitoring.     Code Status  Full Code    Review of Systems  Review of Systems   Unable to perform ROS: Mental status change       Past Medical History   has a past medical history of Cerebral aneurysm (1990's); Cervical radiculopathy; Chronic hepatitis C (HCC); Chronic kidney disease; CVA (cerebral vascular accident) (HCC) (1993); DM (diabetes mellitus) (HCC); HTN (hypertension); Hyperlipidemia; Hypothyroidism; Obstructive sleep apnea; Pacemaker; and Peripheral neuropathy.    Surgical History   has a past surgical history that includes nephrectomy laparoscopic (Left); aortic valve replacement; and mitral valve replacement.    Family History  family history includes No Known Problems in his father and mother.    Social History   reports that he quit smoking about 26 years ago. His smoking use included Cigarettes. He has a 20.00 pack-year smoking history. He has never used smokeless tobacco. He reports that he does not drink alcohol or use drugs.    Medications  Home Medications     Reviewed by Kemi Mcconnell (Pharmacy Tech) on 04/13/19 at 1122  Med List Status: Complete   Medication Last Dose  Status   artificial tears 1.4 % Solution 4/12/2019 Active   atorvastatin (LIPITOR) 40 MG Tab 4/12/2019 Active   calcitRIOL (ROCALTROL) 0.25 MCG Cap 4/12/2019 Active   cyanocobalamin (VITAMIN B-12) 500 MCG Tab 4/12/2019 Active   insulin glargine (LANTUS) 100 UNIT/ML Solution 4/11/2019 Active   levothyroxine (SYNTHROID) 125 MCG Tab 4/12/2019 Active   lidocaine (LIDODERM) 5 % Patch 4/12/2019 Active   lisinopril (PRINIVIL) 2.5 MG Tab 4/12/2019 Active   metoprolol SR (TOPROL XL) 50 MG TABLET SR 24 HR 4/12/2019 Active   pregabalin (LYRICA) 75 MG Cap 4/12/2019 Active   sevelamer (RENAGEL) 800 MG Tab 4/12/2019 Active   traZODone (DESYREL) 50 MG Tab 4/12/2019 Active   vitamin D, Ergocalciferol, (DRISDOL) 00044 units Cap capsule 4/8/2019 Active   warfarin (COUMADIN) 5 MG Tab 4/5/2019 Active              Current Facility-Administered Medications   Medication Dose Route Frequency Provider Last Rate Last Dose   • phytonadione (AQUA-MEPHYTON) 10 mg in NS 50 mL IVPB  10 mg Intravenous Once Fercho KERRY Brantley M.D.       • levETIRAcetam (KEPPRA) tablet 500 mg  500 mg Oral BID Fercho H EMEKA Brantley       • ampicillin/sulbactam (UNASYN) 3 g in  mL IVPB  3 g Intravenous QHS Yovani Bartlett M.D.   Stopped at 04/20/19 2305   • niCARdipine (CARDENE) 25 mg in  mL Infusion  0-15 mg/hr Intravenous Continuous Fercho KERRY Brantley M.D. 50 mL/hr at 04/21/19 0058 5 mg/hr at 04/21/19 0058   • Respiratory Care per Protocol   Nebulization Continuous RT Fercho KERRY Brantley M.D.       • ondansetron (ZOFRAN ODT) dispertab 4 mg  4 mg Oral Q4HRS PRN Fercho KERRY Brantley M.D.        Or   • ondansetron (ZOFRAN) syringe/vial injection 4 mg  4 mg Intravenous Q4HRS PRN Fercho Brantley M.D.       • acetaminophen (TYLENOL) tablet 650 mg  650 mg Oral Q4HRS PRN Fercho Brantley M.D.        Or   • acetaminophen (TYLENOL) suppository 650 mg  650 mg Rectal Q4HRS PRN Fercho Brantley M.D.       • niMODipine (NIMOTOP) capsule 60 mg  60 mg Oral Q4HRS Fercho Brantley  M.D.   Stopped at 04/20/19 2336   • MD Alert...ICU Electrolyte Replacement per Pharmacy   Other PHARMACY TO DOSE Fercho Brantley M.D.       • levETIRAcetam (KEPPRA) 1,000 mg in  mL IVPB  1,000 mg Intravenous Once Ferchobebe Brantley M.D.       • famotidine (PEPCID) tablet 20 mg  20 mg Oral BID Kapil Yi M.D.   Stopped at 04/19/19 1800   • amLODIPine (NORVASC) tablet 10 mg  10 mg Oral Q DAY Corby Ravi M.D.   Stopped at 04/20/19 0600   • insulin lispro (HUMALOG) injection 1-6 Units  1-6 Units Subcutaneous TID AC Corby Ravi M.D.   2 Units at 04/20/19 1749    And   • glucose 4 g chewable tablet 16 g  16 g Oral Q15 MIN PRN Corby Ravi M.D.        And   • dextrose 50% (D50W) injection 25 mL  25 mL Intravenous Q15 MIN PRN Corby Ravi M.D.       • senna-docusate (PERICOLACE or SENOKOT S) 8.6-50 MG per tablet 2 Tab  2 Tab Oral BID Niels Chase M.D.   Stopped at 04/19/19 1800    And   • polyethylene glycol/lytes (MIRALAX) PACKET 1 Packet  1 Packet Oral QDAY PRN Niels Chase M.D.        And   • magnesium hydroxide (MILK OF MAGNESIA) suspension 30 mL  30 mL Oral QDAY PRN Niels Chase M.D.        And   • bisacodyl (DULCOLAX) suppository 10 mg  10 mg Rectal QDAY PRN Niels Chase M.D.       • levothyroxine (SYNTHROID) tablet 125 mcg  125 mcg Oral QAARVIND Chase M.D.   Stopped at 04/20/19 0600   • cyanocobalamin (VITAMIN B-12) tablet 500 mcg  500 mcg Oral DAILY Niels Chase M.D.   Stopped at 04/20/19 0600   • [START ON 4/22/2019] ergocalciferol (CALCIFEROL) drops 50,000 Units  50,000 Units Oral Q7 DAYS Niels Chase M.D.       • calcitRIOL (ROCALTROL) capsule 0.5 mcg  0.5 mcg Oral Once per day on Mon Thu Niels Chase M.D.   0.5 mcg at 04/18/19 0532   • calcitRIOL (ROCALTROL) capsule 0.25 mcg  0.25 mcg Oral Once per day on Sun Tue Wed Fri Sat Niels Chase M.D.   Stopped at 04/20/19 0600   • atorvastatin (LIPITOR)  tablet 40 mg  40 mg Oral Nightly Corby Ravi M.D.   Stopped at 04/19/19 2100   • lisinopril (PRINIVIL) tablet 5 mg  5 mg Oral Q DAY Corby Ravi M.D.   Stopped at 04/20/19 0600   • sevelamer carbonate (RENVELA) tablet 800 mg  800 mg Oral TID WITH MEALS Corby Ravi M.D.   Stopped at 04/19/19 1730   • insulin glargine (LANTUS) injection 10 Units  10 Units Subcutaneous Q EVENING Corby Ravi M.D.   10 Units at 04/20/19 1749   • menthol-methyl salicylate (BENGAY) ointment   Topical TID PRN Corby Ravi M.D.       • artificial tears 1.4 % ophthalmic solution 1 Drop  1 Drop Both Eyes 4X/DAY PRN Corby Ravi M.D.       • lidocaine (LIDODERM) 5 % 1 Patch  1 Patch Transdermal Q24HRS Corby Ravi M.D.   Stopped at 04/14/19 0540       Allergies  No Known Allergies    Vital Signs last 24 hours  Temp:  [36 °C (96.8 °F)-36.1 °C (97 °F)] 36.1 °C (96.9 °F)  Pulse:  [] 101  Resp:  [11-19] 11  BP: ()/(32-73) 113/59  SpO2:  [87 %-100 %] 99 %    Physical Exam  Physical Exam   Constitutional: He appears well-developed.   Ill appearing   HENT:   Head: Normocephalic and atraumatic.   Right IJ dialysis catheter.    Eyes: Pupils are equal, round, and reactive to light. EOM are normal.   Neck: No JVD present. No thyromegaly present.   Cardiovascular: Normal rate and regular rhythm.    No murmur heard.  Pulmonary/Chest: No respiratory distress. He has no wheezes. He has no rales.   Poor cough, ronchi   Abdominal: He exhibits no distension. There is no tenderness. There is no rebound and no guarding.   Peritoneal catheter in RLQ   Musculoskeletal: He exhibits no edema.   Neurological: He is alert. A cranial nerve deficit is present.   Patient has his eyes open and moves his head, doesn't not follow or talk, right eye loss of blink to threat, right arm with some withdrawal, moves spontaneously legs and left arm, sensation intact in all extremities. Right facial  flattening   Skin: No rash noted. No erythema.       Fluids    Intake/Output Summary (Last 24 hours) at 19 0123  Last data filed at 19 2135   Gross per 24 hour   Intake              500 ml   Output             1202 ml   Net             -702 ml       Laboratory  Recent Results (from the past 48 hour(s))   ACCU-CHEK GLUCOSE    Collection Time: 19  5:40 AM   Result Value Ref Range    Glucose - Accu-Ck 226 (H) 65 - 99 mg/dL   APTT    Collection Time: 19  6:35 AM   Result Value Ref Range    APTT 67.8 (H) 24.7 - 36.0 sec   EKG    Collection Time: 19 11:18 AM   Result Value Ref Range    Report       [facility]    Test Date:  2019  Pt Name:    GHAZAL BENEDICT            Department: [facility]  MRN:        [id number]                      Room:       [facility]  Gender:     Male                         Technician: PREM  :        1950                   Requested By:LYNNE MCKENZIE  Order #:    [id number]                    Reading MD: Cesar Pereyra MD    Measurements  Intervals                                Axis  Rate:       99                           P:  [place]:         80                           QRS:        -61  QRSD:       148                          T:          112  QT:         436  QTc:        560    Interpretive Statements  ATRIAL SENSED VENTRICULAR-PACED COMPLEXES  Compared to ECG 2019 08:47:48  NO SIGNIFICANT CHANGES    Electronically Signed On 2019 17:18:40 PDT by Cesar Pereyra MD     ACCU-CHEK GLUCOSE    Collection Time: 19 11:24 AM   Result Value Ref Range    Glucose - Accu-Ck 216 (H) 65 - 99 mg/dL   Comp Metabolic Panel    Collection Time: 19 11:48 AM   Result Value Ref Range    Sodium 143 135 - 145 mmol/L    Potassium 4.3 3.6 - 5.5 mmol/L    Chloride 100 96 - 112 mmol/L    Co2 25 20 - 33 mmol/L    Anion Gap 18.0 (H) 0.0 - 11.9    Glucose 220 (H) 65 - 99 mg/dL    Bun 72 (HH) 8 - 22 mg/dL    Creatinine 9.34 (HH) 0.50 - 1.40 mg/dL     Calcium 10.9 (H) 8.5 - 10.5 mg/dL    AST(SGOT) 8 (L) 12 - 45 U/L    ALT(SGPT) <5 2 - 50 U/L    Alkaline Phosphatase 104 (H) 30 - 99 U/L    Total Bilirubin 0.5 0.1 - 1.5 mg/dL    Albumin 4.1 3.2 - 4.9 g/dL    Total Protein 8.1 6.0 - 8.2 g/dL    Globulin 4.0 (H) 1.9 - 3.5 g/dL    A-G Ratio 1.0 g/dL   ESTIMATED GFR    Collection Time: 04/20/19 11:48 AM   Result Value Ref Range    GFR If  7 (A) >60 mL/min/1.73 m 2    GFR If Non African American 6 (A) >60 mL/min/1.73 m 2   CBC WITH DIFFERENTIAL    Collection Time: 04/20/19 11:49 AM   Result Value Ref Range    WBC 10.8 4.8 - 10.8 K/uL    RBC 4.63 (L) 4.70 - 6.10 M/uL    Hemoglobin 14.3 14.0 - 18.0 g/dL    Hematocrit 43.3 42.0 - 52.0 %    MCV 93.5 81.4 - 97.8 fL    MCH 30.9 27.0 - 33.0 pg    MCHC 33.0 (L) 33.7 - 35.3 g/dL    RDW 44.5 35.9 - 50.0 fL    Platelet Count 136 (L) 164 - 446 K/uL    MPV 12.5 9.0 - 12.9 fL    Neutrophils-Polys 74.40 (H) 44.00 - 72.00 %    Lymphocytes 12.20 (L) 22.00 - 41.00 %    Monocytes 8.90 0.00 - 13.40 %    Eosinophils 3.70 0.00 - 6.90 %    Basophils 0.40 0.00 - 1.80 %    Immature Granulocytes 0.40 0.00 - 0.90 %    Nucleated RBC 0.00 /100 WBC    Neutrophils (Absolute) 8.07 (H) 1.82 - 7.42 K/uL    Lymphs (Absolute) 1.32 1.00 - 4.80 K/uL    Monos (Absolute) 0.96 (H) 0.00 - 0.85 K/uL    Eos (Absolute) 0.40 0.00 - 0.51 K/uL    Baso (Absolute) 0.04 0.00 - 0.12 K/uL    Immature Granulocytes (abs) 0.04 0.00 - 0.11 K/uL    NRBC (Absolute) 0.00 K/uL   Prothrombin Time    Collection Time: 04/20/19 11:49 AM   Result Value Ref Range    PT 24.7 (H) 12.0 - 14.6 sec    INR 2.23 (H) 0.87 - 1.13   ACCU-CHEK GLUCOSE    Collection Time: 04/20/19 11:54 AM   Result Value Ref Range    Glucose - Accu-Ck 192 (H) 65 - 99 mg/dL   ACCU-CHEK GLUCOSE    Collection Time: 04/20/19  5:33 PM   Result Value Ref Range    Glucose - Accu-Ck 220 (H) 65 - 99 mg/dL   ACCU-CHEK GLUCOSE    Collection Time: 04/20/19  7:46 PM   Result Value Ref Range    Glucose - Accu-Ck  230 (H) 65 - 99 mg/dL   EKG    Collection Time: 19  7:56 PM   Result Value Ref Range    Report       [facility]    Test Date:  2019  Pt Name:    GHAZAL BENEDICT            Department: [facility]  MRN:        [id number]                      Room:       [facility]  Gender:     Male                         Technician: SARITHA  :        1950                   Requested By:MEJIA MURPHY  Order #:    [id number]                    Reading MD:    Measurements  Intervals                                Axis  Rate:       107                          P:          105  CA:         116                          QRS:        -64  QRSD:       191                          T:          125  QT:         440  QTc:        587    Interpretive Statements  VENTRICULAR-PACED RHYTHM  NO FURTHER ANALYSIS ATTEMPTED DUE TO PACED RHYTHM  BASELINE WANDER IN LEAD(S) V5  Compared to ECG 2019 11:18:01  No significant changes     ABG - LAB    Collection Time: 19  7:59 PM   Result Value Ref Range    Ph 7.48 7.40 - 7.50    Pco2 28.3 26.0 - 37.0 mmHg    Po2 80.4 64.0 - 87.0 mmHg    O2 Saturation 95.5 93.0 - 99.0 %    Hco3 21 17 - 25 mmol/L    Base Excess -1 -4 - 3 mmol/L    Body Temp see below Centigrade   Basic Metabolic Panel    Collection Time: 19  8:17 PM   Result Value Ref Range    Sodium 135 135 - 145 mmol/L    Potassium 4.6 3.6 - 5.5 mmol/L    Chloride 100 96 - 112 mmol/L    Co2 21 20 - 33 mmol/L    Glucose 232 (H) 65 - 99 mg/dL    Bun 56 (H) 8 - 22 mg/dL    Creatinine 8.01 (HH) 0.50 - 1.40 mg/dL    Calcium 11.2 (H) 8.5 - 10.5 mg/dL    Anion Gap 14.0 (H) 0.0 - 11.9   CBC WITH DIFFERENTIAL    Collection Time: 19  8:17 PM   Result Value Ref Range    WBC 12.6 (H) 4.8 - 10.8 K/uL    RBC 5.02 4.70 - 6.10 M/uL    Hemoglobin 15.4 14.0 - 18.0 g/dL    Hematocrit 46.7 42.0 - 52.0 %    MCV 93.0 81.4 - 97.8 fL    MCH 30.7 27.0 - 33.0 pg    MCHC 33.0 (L) 33.7 - 35.3 g/dL    RDW 45.1 35.9 - 50.0 fL    Platelet Count 173 164 - 446  K/uL    MPV 12.3 9.0 - 12.9 fL    Neutrophils-Polys 82.00 (H) 44.00 - 72.00 %    Lymphocytes 7.40 (L) 22.00 - 41.00 %    Monocytes 8.30 0.00 - 13.40 %    Eosinophils 1.30 0.00 - 6.90 %    Basophils 0.40 0.00 - 1.80 %    Immature Granulocytes 0.60 0.00 - 0.90 %    Nucleated RBC 0.00 /100 WBC    Neutrophils (Absolute) 10.35 (H) 1.82 - 7.42 K/uL    Lymphs (Absolute) 0.93 (L) 1.00 - 4.80 K/uL    Monos (Absolute) 1.05 (H) 0.00 - 0.85 K/uL    Eos (Absolute) 0.17 0.00 - 0.51 K/uL    Baso (Absolute) 0.05 0.00 - 0.12 K/uL    Immature Granulocytes (abs) 0.08 0.00 - 0.11 K/uL    NRBC (Absolute) 0.00 K/uL   ESTIMATED GFR    Collection Time: 04/20/19  8:17 PM   Result Value Ref Range    GFR If  8 (A) >60 mL/min/1.73 m 2    GFR If Non African American 7 (A) >60 mL/min/1.73 m 2   URINALYSIS    Collection Time: 04/20/19  8:21 PM   Result Value Ref Range    Color Yellow     Character Clear     Specific Gravity 1.040 <1.035    Ph 6.0 5.0 - 8.0    Glucose 500 (A) Negative mg/dL    Ketones Negative Negative mg/dL    Protein 300 (A) Negative mg/dL    Bilirubin Negative Negative    Urobilinogen, Urine 0.2 Negative    Nitrite Negative Negative    Leukocyte Esterase Negative Negative    Occult Blood Small (A) Negative    Micro Urine Req Microscopic    URINE MICROSCOPIC (W/UA)    Collection Time: 04/20/19  8:21 PM   Result Value Ref Range    WBC 2-5 (A) /hpf    RBC 10-20 (A) /hpf    Bacteria Negative None /hpf    Epithelial Cells Negative /hpf    Hyaline Cast 0-2 /lpf       Imaging  DX-CHEST-PORTABLE (1 VIEW)   Final Result         1.  Pulmonary edema and/or infiltrates are identified, which are stable since the prior exam.   2.  Cardiomegaly   3.  Atherosclerosis      CT-CTA HEAD WITH & W/O-POST PROCESS   Final Result         1.  Small focal filling defect in proximal left M2 branch, compatible with point focal occlusion. There is adjacent reconstitution.   2.  Distal vertebral artery and bilateral intracranial internal  carotid artery atherosclerosis results in less than 50% stenosis.   3.  No aneurysm identified. Examination limited as described.   4.  Subtle left posterior frontal and parietal subarachnoid hemorrhage      These findings were discussed with the patient's clinician, Yovani Bartlett, on 4/20/2019 9:46 PM.      CT-CEREBRAL PERFUSION ANALYSIS   Final Result      1.  Cerebral blood flow less than 30% likely representing completed infarct = 0 mL.      2.  T Max more than 6 seconds likely representing combination of completed infarct and ischemia = 0 mL.      3.  Mismatched volume likely representing ischemic brain/penumbra = None      4.  Please note that the cerebral perfusion was performed on the limited brain tissue around the basal ganglia region. Infarct/ischemia outside the CT perfusion sections can be missed in this study.      DX-CHEST-LIMITED (1 VIEW)   Final Result         1.  Pulmonary edema and/or infiltrates are identified, which are somewhat decreased since the prior exam.   2.  Cardiomegaly   3.  Atherosclerosis      IR-CVC NON TUNNELED > AGE 5   Final Result      1. ULTRASOUND AND FLUOROSCOPIC GUIDED PLACEMENT OF A RIGHT INTERNAL JUGULAR 12 [place] TRIALYSIS TRIPLE LUMEN NON-TUNNELED HEMODIALYSIS CATHETER.      2. THE HEMODIALYSIS CATHETER MAY BE USED IMMEDIATELY AS CLINICALLY INDICATED. FLUSHES PER PROTOCOL.      CT-CTA HEAD WITH & W/O-POST PROCESS   Final Result      1.  Prior anterior communicating artery aneurysm coiling.  No evidence for recurrent aneurysm.   2.  No focal high-grade cerebral artery stenosis or occlusion.         CT-CEREBRAL PERFUSION ANALYSIS   Final Result      1.  Cerebral blood flow less than 30% likely representing completed infarct = 0 mL.      2.  T Max more than 6 seconds likely representing combination of completed infarct and ischemia = 9 mL.      3.  Mismatched volume likely representing ischemic brain/penumbra = 9      4.  Please note that the cerebral perfusion was performed  on the limited brain tissue around the basal ganglia region. Infarct/ischemia outside the CT perfusion sections can be missed in this study.      IR-US GUIDED PIV   Final Result    Ultrasound-guided PERIPHERAL IV INSERTION performed by    qualified nursing staff as above.            CT-HEAD W/O   Final Result      No acute intracranial abnormality      CT-HEAD W/O   Final Result      1.  No evidence of acute intracranial process.      2.  Cerebral atrophy as well as periventricular chronic small vessel ischemic change.      3.  Again seen metallic structure within the supraclinoid region.      4.  Small focus of encephalomalacia involving the left frontal lobe, unchanged.      DX-ABDOMEN FOR TUBE PLACEMENT   Final Result      Cortrak nasogastric tube position consistent with intragastric placement.      CT-CTA NECK WITH & W/O-POST PROCESSING   Final Result      Unremarkable CT angiogram of the neck. No high-grade stenosis, large vessel occlusion, aneurysm or dissection.      CT-CTA HEAD WITH & W/O-POST PROCESS   Final Result      1.  No large vessel occlusion, high-grade stenosis or aneurysm of the [place].   2.  Suprasellar aneurysm coiling.      CT-RENAL COLIC EVALUATION(A/P W/O)   Final Result         1. No acute abnormality in the abdomen or pelvis.   2. Absent left kidney. Unremarkable right kidney. No hydronephrosis.   3. Bibasilar atelectasis/scarring. No pleural effusions.      CT-CEREBRAL PERFUSION ANALYSIS   Final Result      1.  Cerebral blood flow less than 30% likely representing completed infarct = 0 mL.      2.  T Max more than 6 seconds likely representing combination of completed infarct and ischemia = 0 mL.      3.  Mismatched volume likely representing ischemic brain/penumbra = 0 mL.      4.  Please note that the cerebral perfusion was performed on the limited brain tissue around the basal ganglia region. Infarct/ischemia outside the CT perfusion sections can be missed in this study.       CT-HEAD W/O   Final Result      1.  No CT evidence of acute infarct, hemorrhage or mass.   2.  Small to moderate global parenchymal atrophy and chronic small vessel ischemic changes.      DX-CHEST-PORTABLE (1 VIEW)   Final Result         1. Nonspecific bilateral interstitial and alveolar opacities, likely mild edema. No significant pleural effusions.   2. Mild cardiomegaly.      CT-HEAD W/O    (Results Pending)       Assessment/Plan  * CVA (cerebral vascular accident) ([facility])- (present on admission)   Assessment & Plan    No stroke seen clinically per neurology likely new cardio embolic stroke. Unable to get MRI since pace maker.     Hold anticoagulation now since now with acute SAH.       ESRD (end stage renal disease) ([facility])- (present on admission)   Assessment & Plan    Nephrology following has a peritoneal catheter and right IJ dialysis catheter appreciate nephrology recommendation   Renal dose medication   Avoid nephrotoxins.      Chronic anticoagulation- (present on admission)   Assessment & Plan    For [facility] mechanical aortic valve.     K centra 4/20 for acute SAH with elevated INR to 2.2  Vit k  Discuss with neurosurgery timing of restarting.     Recheck INR and artemio with mapping      Acute on chronic systolic heart failure ([facility])- (present on admission)   Assessment & Plan    EF 45%   Moderate RV dysfunction, RVSP 55    Avoid excessive fluids  BP control and monitoring.      Diabetes ([facility])- (present on admission)   Assessment & Plan    BS goal < 180  Hypoglycemic protocols     Hypertension- (present on admission)   Assessment & Plan    Will control with nicardipine for SBP < 140      SAH (subarachnoid hemorrhage) ([facility])   Assessment & Plan    Bleed date: 4/20   SBP goal prior to being secured: <140-160    Nimodipine 60mg Q4  Neuro checks w/  > 2 drop in GCS or NIHSS  Inc. > 2  Vasospasm watch: fever, neuro exam, urine output>200ml/hr, serum sodium  Maintain euvolemia, eunatremia, allow for permissive  hypertension post repair  Seizure prophylaxis < 7days  Daily TCD and lindegaard ratio goal < 2.5    Location and history of fall on supra-therapeutic INR wether delay ICH thus -> traumatic SAH will need to discuss with Neurosurgery CTA negative for aneurysm but obviously patient is higher risk with his prior SAH s/p coiling in pass.     Will get repeat CT head in 6 hrs since CTA to allow contrast to come out.   Aspiration precaution  NPO         Leukocytosis- (present on admission)   Assessment & Plan    Continue to monitor   On Unasyn      Complete heart block (HCC)- (present on admission)   Assessment & Plan    Pacemaker continue to monitor on telemetry     History of mechanical fall without injury- (present on admission)   Assessment & Plan    Likely related to CVA continue to monitor, fall precautions, PT/OT rehab.      Hypothyroidism- (present on admission)   Assessment & Plan    Continue synthyroid     Hyperlipidemia- (present on admission)   Assessment & Plan    Statin when stable         Discussed patient condition and risk of morbidity and/or mortality with RN, Pharmacy, [employer] [facility] resident, Patient and cardiology and neurosurgery.    The patient remains critically ill from SAH and acute reversal of coumadin with kcentra and close neuromonitoring.  Critical care time = 80 minutes in directly providing and coordinating critical care and extensive data review.  No time overlap and excludes procedures.

## 2019-04-21 NOTE — ASSESSMENT & PLAN NOTE
[place] mechanical aortic valve  Coumadin reversed with PCC and vitamin K  TEG with platelet mapping confirms successful reversal  Anticoagulants strictly contraindicated for now  Resume anticoagulation when clinically appropriate, may need to wait 1 to 2 weeks post initial abnormal CT with repeat CT at that time and conversations with neurology/neurosurgery

## 2019-04-22 NOTE — THERAPY
Attempted PT txt. RN advising to hold at this time as pt is currently awaiting palliative consult for further establishment of plan of care. Per RN, pt has had worsening neurological signs and is unable to follow. Will re-attempt as able.

## 2019-04-22 NOTE — PROGRESS NOTES
Neurosurgery Progress Note    Subjective:  Hospital Day 9  Left MCA CVA with small Left sided SAH on scan 2 days ago.    Repeat scan this AM with slight increase in area of hemorrhage, but no mass effect ofr shift.  Neuro exam Stable on sedatation.  Spontaneous on Left side, withdrawal only on Right   Due for dialysis this AM   Pressures good       Exam:  Intubated and sedated but lightly.    Spontaneous Left had annd leg movement,  Right flaccid (Withdraws per nursing     Pulse  Av.2  Min: 60  Max: 106  Resp  Av  Min: 13  Max: 28  Temp  Av.7 °C (98.1 °F)  Min: 36.2 °C (97.1 °F)  Max: 37.2 °C (99 °F)  SpO2  Av.9 %  Min: 91 %  Max: 100 %    No Data Recorded    Recent Labs      19   WBC  12.6*  10.7  13.7*   RBC  5.02  4.27*  3.55*   HEMOGLOBIN  15.4  13.1*  10.8*   HEMATOCRIT  46.7  40.4*  33.9*   MCV  93.0  94.6  95.5   MCH  30.7  30.7  30.4   MCHC  33.0*  32.4*  31.9*   RDW  45.1  45.7  46.7   PLATELETCT  173  140*  113*   MPV  12.3  12.4  12.8     Recent Labs      19   SODIUM  135  143  142   POTASSIUM  4.6  5.0  4.7   CHLORIDE  100  105  106   CO2  21  23  20   GLUCOSE  232*  191*  253*   BUN  56*  55*  69*   CREATININE  8.01*  8.71*  10.58*   CALCIUM  11.2*  10.0  9.4     Recent Labs      19   1149  19   034   INR  2.23*  1.43*  1.27*     Recent Labs      19   REACTMIN  4.8*   CLOTKINET  1.0   CLOTANGL  75.0*   MAXCLOTS  77.0*   [id number]  0.0   PRCINADP  36.2   PRCINAA  6.8       Intake/Output       19 0700 - 19 0659 04700 - 19 Total  Total       Intake    I.V.  699.5  638.8 1338.3  --  -- --    Cardene Volume 699.5 606.8 1306.3 -- -- --    Norepinephrine Volume -- 32 32 -- -- --    Other  --  120 120  --  -- --    Flush / Irrigation Volume -- 120 120 -- -- --    IV Piggyback  --  100  100  --  -- --    Volume (mL) (ampicillin/sulbactam (UNASYN) 3 g in  mL IVPB) -- 100 100 -- -- --    Total Intake 699.5 858.8 1558.3 -- -- --       Output    Urine  --  0 0  --  -- --    Number of Times Voided -- 2 x 2 x -- -- --    Urine Void (mL) -- 0 0 -- -- --    Stool  --  -- --  --  -- --    Number of Times Stooled -- 1 x 1 x -- -- --    Total Output -- 0 0 -- -- --       Net I/O     699.5 858.8 1558.3 -- -- --            Intake/Output Summary (Last 24 hours) at 04/22/19 0807  Last data filed at 04/22/19 0400   Gross per 24 hour   Intake          1022.84 ml   Output                0 ml   Net          1022.84 ml            • famotidine  20 mg DAILY   • ampicillin-sulbactam (UNASYN) IV  3 g Q12HRS   • Respiratory Care per Protocol   Continuous RT   • senna-docusate  2 Tab BID    And   • polyethylene glycol/lytes  1 Packet QDAY PRN    And   • bisacodyl  10 mg QDAY PRN   • Pharmacy   PHARMACY TO DOSE   • fentaNYL  25 mcg Q HOUR PRN    Or   • fentaNYL  50 mcg Q HOUR PRN    Or   • fentaNYL  100 mcg Q HOUR PRN   • ipratropium-albuterol  3 mL Q2HRS PRN (RT)   • ipratropium-albuterol  3 mL Q4HRS (RT)   • propofol  0-80 mcg/kg/min Continuous   • amLODIPine  10 mg Q DAY   • atorvastatin  40 mg Nightly   • [START ON 4/23/2019] calcitRIOL  0.25 mcg Once per day on Sun Tue Wed Fri Sat   • calcitRIOL  0.5 mcg Once per day on Mon Thu   • cyanocobalamin  500 mcg DAILY   • ergocalciferol  50,000 Units Q7 DAYS   • insulin lispro  1-6 Units Q6HRS    And   • glucose  16 g Q15 MIN PRN    And   • dextrose 50%  25 mL Q15 MIN PRN   • levETIRAcetam  500 mg BID   • levothyroxine  125 mcg QAM   • niMODipine  60 mg Q4HRS   • acetaminophen  650 mg Q4HRS PRN    Or   • acetaminophen  650 mg Q4HRS PRN   • ondansetron  4 mg Q4HRS PRN    Or   • ondansetron  4 mg Q4HRS PRN   • famotidine  20 mg DAILY   • NORepinephrine (LEVOPHED) infusion  0-30 mcg/min Continuous   • niCARdipine infusion  0-15 mg/hr Continuous   • insulin glargine  10  Units Q EVENING   • menthol-methyl salicylate   TID PRN   • artificial tears  1 Drop 4X/DAY PRN   • lidocaine  1 Patch Q24HRS       Assessment and Plan:  Hospital day #9 Left MCA Stroke with new small left sided SAH  Repeat scan with slight increase in hemorrhage but minimal and no shift.  Neuro unchanged with Dense Right hemiparesis and aphasia  Per Dr Jack --Hold anticoagulation (coumadin) x 1 week  Avoid significant Hypertension, but can ease blood pressure limits (nimatop)  No NS intervention needed at present.  Will sign off for now , but reconsult [facility] if sx worsen and family wants to proceed with Craniotomy

## 2019-04-22 NOTE — CARE PLAN
Problem: Ventilation Defect:  Goal: Ability to achieve and maintain unassisted ventilation or tolerate decreased levels of ventilator support    Intervention: Support and monitor invasive and noninvasive mechanical ventilation  Adult Ventilation Update    Total Vent Days: 2  Vent: APVCMV [id number]    Patient Lines/Drains/Airways Status    Active Airway     Name: Placement date: Placement time: Site: Days:    Airway ETT Oral 8.0 04/21/19   1640   Oral   2              Plateau Pressure (Q Shift): 15 (04/21/19 1822)  Static Compliance (ml / cm H2O): 65 (04/22/19 0202)    Patient failed trials because of    Barriers to SBT    Length of Weaning Trial      Cough: Productive (04/22/19 0202)  Sputum Amount: Scant (04/22/19 0202)  Sputum Color: White;Clear (04/22/19 0202)  Sputum Consistency: Thick;Thin (04/22/19 0202)    Mobility  Level of Mobility: Level IV (04/21/19 2000)  Activity Performed: Unable to mobilize (04/21/19 2000)  Time Activity Tolerated: 17 min (04/20/19 1700)  Distance Per Occurrence (ft.): 0 feet (04/20/19 1700)  # of Times Distance was Traveled: 0 (04/20/19 1700)  Assistance / Tolerance: Assistance of Two or More (04/20/19 1700)  Pt Calls for Assistance: Yes (04/20/19 1700)  Staff Present for Mobilization: RN;CNA (04/20/19 1700)  Gait: Unsteady (04/20/19 1700)  Assistive Devices: Hand held assist;Rails (04/20/19 1700)  Reason Not Mobilized: Unstable condition;Bed rest (04/21/19 2000)  Mobilization Comments: RASS -3, neurologically unable to participate, ROM (04/21/19 2000)    Events/Summary/Plan: Duo Q4. Pt currently stable on vent.

## 2019-04-22 NOTE — CARE PLAN
Problem: Mobility  Goal: Risk for activity intolerance will decrease    Intervention: Assess and monitor signs of activity intolerance  Unable to participate in mobility, ROM performed x 4 ext

## 2019-04-22 NOTE — ASSESSMENT & PLAN NOTE
EF 45%  Forced diuresis as needed  Monitor clinical signs and symptoms of heart failure  Judicious fluid management

## 2019-04-22 NOTE — PROGRESS NOTES
Critical Care Progress Note    Date of admission  4/13/2019    Chief Complaint  69 y.o. male admitted 4/13/2019 with altered mental status.    Hospital Course    This gentleman was admitted to the ICU with worsening respiratory failure, worsening mental status and a stroke associated with an intracranial hemorrhage.      Interval Problem Update  Reviewed last 24 hour events:    Intubated for airway protection yesterday  FOB yesterday  Not following, motor limitations  No MRI - has pacer  NE titrating 3  TF goal  Low UO - CAPD -> HD  Vent day #2  50% PEEP 8  CXR ET ok LLL slt better ATX  Secretions min  Unasyn  BC and BAL pending  Plt 113/Hgb 11  SSI 6 units/24 hrs    Stop Nimodipine if ok with NS?  Palliative to see    D/w NS/resident - stop Nimtop  D/w RN/CM - DNR per advanced directive at [facility]?    D/w Wife of 8 years at bedside    D/w Palliative Care APN  Who d/w son POA/wife  Wife in agreement with son  Full treat - DNAR - I ok     YESTERDAY  Lethargy  SAH  Coumadin reversed  Moans, does not follow  LUE purposeful - all other 3 extremities withdraws  Nicardipine 8  2 L NC  Nimodipine  PCC given    1600 hours:    Multiple interval reassessments.  He is unable to clear his secretions.  We are unable to adequately suction his secretions.  He requires intubation and mechanical ventilatory support.  I discussed this with the wife at the bedside and she agrees.      Review of Systems  Review of Systems   Unable to perform ROS: Intubated        Vital Signs for last 24 hours   Temp:  [36.2 °C (97.1 °F)-37.2 °C (99 °F)] 36.7 °C (98.1 °F)  Pulse:  [] 103  Resp:  [14-29] 29  SpO2:  [98 %-100 %] 98 %    Hemodynamic parameters for last 24 hours       Respiratory Information for the last 24 hours  Qu[place]ones Vent Mode: Spont  Rate (breaths/min): 22  Vt Target (mL): 430  PEEP/CPAP: 8  FiO2: 40  P Support: 5  P MEAN: 10  Length of Weaning Trial (Hours): 1  Control VTE (exp VT): 562    Physical Exam   Physical Exam    Constitutional: He appears well-nourished. He appears lethargic. He has a sickly appearance. No distress. He is intubated.   HENT:   Head: Normocephalic and atraumatic.   Right Ear: External ear normal.   Left Ear: External ear normal.   Nose: Nose normal.   Mouth/Throat: Oropharynx is clear and moist. Mucous membranes are not dry and not cyanotic.   Eyes: Pupils are equal, round, and reactive to light. Conjunctivae are normal. Right eye exhibits no discharge. Left eye exhibits no discharge.   Neck: Normal range of motion. Neck supple. No JVD present. No tracheal deviation present. No thyromegaly present.   Cardiovascular: Intact distal pulses.  Exam reveals no gallop and no friction rub.    No murmur heard.  SR, occ pacing   Pulmonary/Chest: He is intubated. No respiratory distress. He has no wheezes. He has rhonchi. He has rales (Significant coarse crackles bilaterally).   Abdominal: Soft. Bowel sounds are normal. He exhibits no distension. There is no tenderness. There is no rebound and no CVA tenderness.   Musculoskeletal: He exhibits no edema or tenderness.   Lymphadenopathy:     He has no cervical adenopathy.   Neurological: He appears lethargic. No cranial nerve deficit. He exhibits abnormal muscle tone.   Unresponsive, spont moves L side some     Skin: Skin is warm and dry. No rash noted. He is not diaphoretic. No cyanosis or erythema. Nails show no clubbing.   Psychiatric:   Unable to assess   Vitals reviewed.      Medications  Current Facility-Administered Medications   Medication Dose Route Frequency Provider Last Rate Last Dose   • famotidine (PEPCID) injection 20 mg  20 mg Intravenous DAILY Adithya Arroyo M.D.   Stopped at 04/22/19 0600   • ampicillin/sulbactam (UNASYN) 3 g in  mL IVPB  3 g Intravenous Q12HRS Aure Seals M.D.   Stopped at 04/22/19 1711   • Respiratory Care per Protocol   Nebulization Continuous RT Neil Brothers M.D.       • senna-docusate (PERICOLACE  or SENOKOT S) 8.6-50 MG per tablet 2 Tab  2 Tab Enteral Tube BID Neil Brothers M.D.   Stopped at 04/22/19 1800    And   • polyethylene glycol/lytes (MIRALAX) PACKET 1 Packet  1 Packet Enteral Tube QDAY PRN Neil Brothers M.D.        And   • bisacodyl (DULCOLAX) suppository 10 mg  10 mg Rectal QDAY PRN Neil Brothers M.D.       • Pharmacy Consult: Enteral tube insertion - review meds/change route/product selection   Other PHARMACY TO DOSE Neil Brothers M.D.       • fentaNYL (SUBLIMAZE) injection 25 mcg  25 mcg Intravenous Q HOUR PRN Neil Brothers M.D.        Or   • fentaNYL (SUBLIMAZE) injection 50 mcg  50 mcg Intravenous Q HOUR PRN Neil Brothers M.D.        Or   • fentaNYL (SUBLIMAZE) injection 100 mcg  100 mcg Intravenous Q HOUR PRN Neil Brothers M.D.   100 mcg at 04/22/19 1931   • ipratropium-albuterol (DUONEB) nebulizer solution  3 mL Nebulization Q2HRS PRN (RT) Neil Brothers M.D.       • ipratropium-albuterol (DUONEB) nebulizer solution  3 mL Nebulization Q4HRS (RT) Neil Brothers M.D.   3 mL at 04/22/19 1827   • propofol (DIPRIVAN) injection  0-80 mcg/kg/min Intravenous Continuous Neil Brothers M.D.   Stopped at 04/21/19 1645   • amLODIPine (NORVASC) tablet 10 mg  10 mg Enteral Tube Q DAY Neil Brothers M.D.   Stopped at 04/22/19 0600   • atorvastatin (LIPITOR) tablet 40 mg  40 mg Enteral Tube Nightly Neil Brothers M.D.   40 mg at 04/22/19 2000   • [START ON 4/23/2019] calcitRIOL (ROCALTROL) capsule 0.25 mcg  0.25 mcg Enteral Tube Once per day on Sun Tue Wed Fri Sat Neil Brothers M.D.       • calcitRIOL (ROCALTROL) capsule 0.5 mcg  0.5 mcg Enteral Tube Once per day on Mon Thu Neil Brothers M.D.   0.5 mcg at 04/22/19 0620   • cyanocobalamin (VITAMIN B-12) tablet 500 mcg  500 mcg Enteral Tube DAILY Neil Brothers M.D.   500 mcg at 04/22/19 0613   • ergocalciferol (CALCIFEROL) drops  50,000 Units  50,000 Units Enteral Tube Q7 DAYS Neil Brothers M.D.   50,000 Units at 04/22/19 0615   • insulin lispro (HUMALOG) injection 1-6 Units  1-6 Units Subcutaneous Q6HRS Neil Brothers M.D.   2 Units at 04/22/19 1647    And   • glucose 4 g chewable tablet 16 g  16 g Oral Q15 MIN PRN Neil Brothers M.D.        And   • dextrose 50% (D50W) injection 25 mL  25 mL Intravenous Q15 MIN PRN Neil Brothers M.D.       • levETIRAcetam (KEPPRA) tablet 500 mg  500 mg Enteral Tube BID Neil Brothers M.D.   500 mg at 04/22/19 1641   • levothyroxine (SYNTHROID) tablet 125 mcg  125 mcg Enteral Tube QAM Neil Brothers M.D.   125 mcg at 04/22/19 0618   • acetaminophen (TYLENOL) tablet 650 mg  650 mg Enteral Tube Q4HRS PRN Neil Brothers M.D.   650 mg at 04/22/19 2000    Or   • acetaminophen (TYLENOL) suppository 650 mg  650 mg Rectal Q4HRS PRN Neil Brothers M.D.       • ondansetron (ZOFRAN ODT) dispertab 4 mg  4 mg Enteral Tube Q4HRS PRN Neil Brothers M.D.        Or   • ondansetron (ZOFRAN) syringe/vial injection 4 mg  4 mg Intravenous Q4HRS PRN Neil Brothers M.D.       • famotidine (PEPCID) tablet 20 mg  20 mg Enteral Tube DAILY Neil Brothers M.D.   20 mg at 04/22/19 0600   • norepinephrine (LEVOPHED) 8 mg in  mL Infusion  0-30 mcg/min Intravenous Continuous Neil Brothers M.D.   Stopped at 04/22/19 0940   • niCARdipine (CARDENE) 25 mg in  mL Infusion  0-15 mg/hr Intravenous Continuous Beni Jack III, M.D.   Stopped at 04/21/19 1630   • insulin glargine (LANTUS) injection 10 Units  10 Units Subcutaneous Q EVENING Corby Ravi M.D.   10 Units at 04/22/19 1648   • menthol-methyl salicylate (BENGAY) ointment   Topical TID PRN Corby Ravi M.D.       • artificial tears 1.4 % ophthalmic solution 1 Drop  1 Drop Both Eyes 4X/DAY PRN Corby Ravi M.D.       • lidocaine (LIDODERM) 5 % 1  Patch  1 Patch Transdermal Q24HRS Corby Ravi M.D.   1 Patch at 04/22/19 0618       Fluids    Intake/Output Summary (Last 24 hours) at 04/22/19 2040  Last data filed at 04/22/19 2000   Gross per 24 hour   Intake          1344.74 ml   Output             2500 ml   Net         -1155.26 ml       Laboratory  Recent Labs      04/20/19 1959 04/21/19   1741 04/22/19   0439   RVDXN07C  7.48   --    --    LAFUAI941H  28.3   --    --    WDMRM350C  80.4   --    --    GHZS5RVD  95.5   --    --    ARTHCO3  21   --    --    ARTBE  -1   --    --    ISTATAPH   --   7.395*  7.354*   ISTATAPCO2   --   31.9  36.9   ISTATAPO2   --   225*  222*   ISTATATCO2   --   20  22   UMYTLJS8VOT   --   100*  100*   ISTATARTHCO3   --   19.5  20.6   ISTATARTBE   --   -5*  -4   ISTATTEMP   --   97.4 F  99.1 F   ISTATFIO2   --   100  60   ISTATSPEC   --   Arterial  Arterial   ISTATAPHTC   --   7.404  7.350*   JHQVTMXO4NR   --   222*  224*         Recent Labs      04/20/19 2017 04/21/19   0345  04/22/19   0345   SODIUM  135  143  142   POTASSIUM  4.6  5.0  4.7   CHLORIDE  100  105  106   CO2  21  23  20   BUN  56*  55*  69*   CREATININE  8.01*  8.71*  10.58*   MAGNESIUM   --   2.0  1.9   PHOSPHORUS   --   7.9*  8.7*   CALCIUM  11.2*  10.0  9.4     Recent Labs      04/20/19   1148  04/20/19 2017 04/21/19   0345  04/22/19   0345   ALTSGPT  <5   --   <5  <5   ASTSGOT  8*   --   9*  7*   ALKPHOSPHAT  104*   --   93  82   TBILIRUBIN  0.5   --   0.5  0.5   PREALBUMIN   --    --    --   21.0   GLUCOSE  220*  232*  191*  253*     Recent Labs      04/20/19   1148   04/20/19 2017 04/21/19   0345  04/22/19   0345   WBC   --    < >  12.6*  10.7  13.7*   NEUTSPOLYS   --    < >  82.00*  78.80*  82.80*   LYMPHOCYTES   --    < >  7.40*  9.70*  6.80*   MONOCYTES   --    < >  8.30  8.80  9.10   EOSINOPHILS   --    < >  1.30  1.70  0.60   BASOPHILS   --    < >  0.40  0.50  0.30   ASTSGOT  8*   --    --   9*  7*   ALTSGPT  <5   --    --   <5  <5    ALKPHOSPHAT  104*   --    --   93  82   TBILIRUBIN  0.5   --    --   0.5  0.5    < > = values in this interval not displayed.     Recent Labs      04/20/19   1149  04/20/19 2017 04/21/19   0345  04/22/19   0345   RBC  4.63*  5.02  4.27*  3.55*   HEMOGLOBIN  14.3  15.4  13.1*  10.8*   HEMATOCRIT  43.3  46.7  40.4*  33.9*   PLATELETCT  136*  173  140*  113*   PROTHROMBTM  24.7*   --   17.5*  16.1*   INR  2.23*   --   1.43*  1.27*       Imaging  X-Ray:  I have personally reviewed the images and compared with prior images. and My impression is: Prominent interstitium    Assessment/Plan  * Acute hypoxemic respiratory failure (HCC)   Assessment & Plan    Intubated for airway protection and inability to clear secretions on 4/21  RT protocols  Ventilator bundle  Inappropriate for weaning/extubation  Serial ABG/chest x-ray/ventilator mechanics review  Limit sedation given poor LOC     SAH (subarachnoid hemorrhage) (HCC)   Assessment & Plan    Neurosurgery evaluation noted  Frequent neuro checks  Anticoagulation reversed and being held  Follow-up imaging as needed, cannot do MRI with pacer  Consider follow-up CT especially if neurologically does not improve  Consider follow-up with neurology later this week as well  Limit sedation  PT/OT when clinically appropriate     CVA (cerebral vascular accident) (HCC)- (present on admission)   Assessment & Plan    CTA on 4/20 with small focal filling defect in proximal left M2 branch with adjacent reconstitution  Subtle left posterior frontal and parietal subarachnoid hemorrhage - no need for surgical intervention per Dr. Jack  Anticoagulation with Coumadin reversed with PCC and vitamin K - post treatment TEG with platelet mapping shows no evidence of coagulopathy  Anticoagulants are strictly contraindicated  Hourly neuro checks  Strict blood pressure control with goal SBP less than 140  Continue nimodipine, 60 mg every 4 hours  Continue high intensity statin  PT OT when  clinically appropriate  Limit sedation  Intubated 4/21 to protect the airway  Resume anticoagulation for mechanical heart valve when okay with neurology/neurosurgery     Chronic systolic (congestive) heart failure (HCC)   Assessment & Plan    EF 45%  Forced diuresis as needed  Monitor clinical signs and symptoms of heart failure  Judicious fluid management     Aspiration pneumonia (HCC)   Assessment & Plan    Sputum culture pending  Continue Unasyn, plan 5-7 days  Serial CBC  Serial chest x-ray  Swallow eval when extubated     Atelectasis   Assessment & Plan    Aggressive pulmonary toilet, repeat bronchoscopy as needed  Serial chest x-ray     Elevated troponin- (present on admission)   Assessment & Plan    Demand ischemia?  Secondary to CNS injury?  Reevaluate if becomes clinically appropriate     Chronic anticoagulation- (present on admission)   Assessment & Plan    [place] mechanical aortic valve  Coumadin reversed with PCC and vitamin K  TEG with platelet mapping confirms successful reversal  Anticoagulants strictly contraindicated for now  Resume anticoagulation when clinically appropriate     Diabetes (HCC)- (present on admission)   Assessment & Plan    Monitor blood sugars  Goal glucose less than 180  SSI as needed  Hypoglycemia protocols as needed     Hypertension- (present on admission)   Assessment & Plan    Strict blood pressure control with goal SBP less than 140  Titrate nicardipine drip as needed  Monitor for the need for arterial line  Continue amlodipine, 10 mg daily  Adjust enteral regimen and IV as needed regimen daily with clinical pharmacist     ESRD (end stage renal disease) ([facility])- (present on admission)   Assessment & Plan    Continue HD  Renal dose medications  Avoid nephrotoxins  Judicious fluid management       Hepatitis A- (present on admission)   Assessment & Plan    Monitor     High anion gap metabolic acidosis- (present on admission)   Assessment & Plan    Optimize  electrolytes  Controlled diabetes with sliding scale insulin, monitor for need for insulin 180 protocol and IV infusion of insulin  Monitor for need for IV bicarb  Monitor for infectious or other etiologies for this abnormality     Leukocytosis- (present on admission)   Assessment & Plan    Monitor     Anemia- (present on admission)   Assessment & Plan    No bleeding clinically but mild anemia present again, query hydration, off anticoagulants  Serial CBC  Transfuse per conservative policy, hemoglobin greater than 7 ideally, using hemoglobin of 8 for significant ischemia/bleeding     Complete heart block (HCC)- (present on admission)   Assessment & Plan    History of pacemaker  Monitor  Optimize electrolytes     H/O aortic valve replacement- (present on admission)   Assessment & Plan    [place] aortic valve  Echo with mean valve gradient of 33 mmHg  Anticoagulation strictly contraindicated for now, resume when okay with neuro team/CNS bleeding risk acceptable when compared to thrombotic complications of mechanical valve without anticoagulation     Obstructive sleep apnea- (present on admission)   Assessment & Plan    History of TARIK, reassess when extubated, currently intubated and airway controlled  Monitor     Hypothyroidism- (present on admission)   Assessment & Plan    Continue levothyroxine, 125 mcg daily  Monitor     Hyperlipidemia- (present on admission)   Assessment & Plan    Continue statin  Diet          VTE:  Heparin  Ulcer: H2 Antagonist  Lines: Central Line  Ongoing indication addressed and Leach Catheter  Ongoing indication addressed    I have performed a physical exam and reviewed and updated ROS and Plan today (4/22/2019). In review of yesterday's note (4/21/2019), there are no changes except as documented above.     I have assessed and reassessed his respiratory status and made ventilator adjustments based upon arterial blood gas analysis as well as analysis of ventilator waveforms and airway  mechanics.  I have assessed and reassessed his blood pressure, hemodynamics, cardiovascular status with titration of nicardipine and neurologic status with titration of sedation.  He is at high risk for worsening respiratory, cardiovascular and CNS system dysfunction.    Discussed patient condition and risk of morbidity and/or mortality with Family, RN, RT, Pharmacy, [employer] [facility] resident, Charge nurse / hot rounds and Palliative care APN     The patient remains critically ill.  Critical care time = 50 minutes in directly providing and coordinating critical care and extensive data review.  No time overlap and excludes procedures.    Pulmonary and Critical Care Medicine

## 2019-04-22 NOTE — CARE PLAN
Problem: Infection  Goal: Will remain free from infection    Intervention: Implement standard precautions and perform hand washing before and after patient contact  In place      Problem: Discharge Barriers/Planning  Goal: Patient's continuum of care needs will be met    Intervention: Involve patient and significant other/support system in setting and prioritizing goals for hospital stay and discharge  Pt wife met with palliative care RN

## 2019-04-22 NOTE — THERAPY
Occupational Therapy Contact Note    OT treatment attempted now that patient has transferred to RICU. Per RN pt not following commands, intubated, awaiting palliative consult to determine GOC. Will attempt as appropriate.    Hannah Goel OTR/L

## 2019-04-22 NOTE — PROGRESS NOTES
HD treatment today using [facility] temp CVC.Treatment tolerated well.Net UF removed 2.5 L.Report given to primary Rn.

## 2019-04-22 NOTE — CONSULTS
"Reason for PC Consult: Advance Care Planning    Consulted by: Aure Seals DO    Assessment:  General: 68yo gentleman BIB EMS and admitted through ED 4/13 with AMS - likely cardio embolic stroke with aphasia, was improving and scheduled for d/c to acute rehab but with worsening AMS 4/20 - repeat CT - infarct left MCA territory and new SAH, 4/21 intubated and bronchoscopy, on HD here.  PMH: ESRD on PD at home, AVR s/p mechanical valve (so no MRI), PPM 2/2 3rd degree heart block, IDDM, cerebral aneurysm s/p coiling, Hep C, TARIK, remote smoking history (previous CVA per son)    Dyspnea: Yes- mechanically ventilated  Last BM: 04/20/19-    Pain: Unable to determine-    Depression: Unable to determine-      Spiritual:  Is Mu-ism or spirituality important for coping with this illness? Unable to determine-    Has a  or spiritual provider visit been requested?      Palliative Performance Scale: 30    Advanced Directive: None- Received [facility] [employer] [facility] - dated [facility], pt chose his son Efrain SMITH as DPOA-HC and his dtr Vishal as 1st alternative.  He also checked 'no' box for all scenarios regarding wanting life-sustaining treatments.  DPOA:  -    POLST:No-      Code Status: Full- Addressed with pt's wife, including survival rates and what attempting resuscitation looks like.  Wife stated that pt had expressed full code in the past but after reviewing AD and those scenarios where pt would not want life prolonging measures, wife stated that she would agree to whatever pt's children decide.  (14:40 - DNR per son/DPOA-HC)    Social: Pt lives with his wife of eight years, Ameena Martines.  They moved to [place] from [place] six years ago.  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  Pt does all the cooking and cleaning at home.  His energy has been much less since started dialysis and he mostly enjoys watching tv.  His wife works night shift as a CNA on [employer].  Pt has two adult " "children, son Efrain SMITH and dtr Vishal Laddups - both live in [place].    Outcome:  Pt intubated, not awake, receiving dialysis, not on sedation.  Escorted wife Ameena to conference room.  Introduced self and role of Palliative Care.  Assessed wife's understanding of pt's current medical status, overall health picture, and options for future care.  Ameena verbalized her understanding that pt has had second stroke.  Ameena acknowledged pt's cardiac issues and inability to get MRI d/t mechanical valve.  Provided additional education on issue of anticoagulants and stroke/bleeding risks.  Ameena verbazlied understanding.  She shared that she had been encouraging pt to change from PD to HD because the PD was becoming more time consuming and she felt the HD would help more with pt's energy level.      Explored wife's/pt's expressed values, beliefs, and preferences in order to identify GOC.  Ameena described pt as very independent and private (which is main reason he wanted to do his dialysis himself).  In fact, earlier in this admission, pt had phoned her in the middle of the night requesting that she come in and help him after incontinence, because he did not want to ask the staff for help.      Reviewed pt's AD.  Wife stated that she thinks there ay be a more recent AD from 2011 and she planned to look for this at home.  Wife stated that pt's wishes on the AD were consistent to what type of person he is - including that he would not want to be dependent on anyone for ADLs.  Wife stated that she would agree with any decisions that pt's children make, even if she is the DPOA on a more recent AD,  \"because I don't want to be making a decision they wouldn't agree with.\"  Assured Ameena that the IDT would be hopeful for and help facilitate agreement among all.    Ameena provided phone #'s for pt's children - son Efrain SMITH [phone], dtr Vishal [phone].  14:20 - Phoned son, provided overview and update, " "answered clinical questions.  Efrain also has pt's 2007 AD and does not recall a more recent version.  Efrain stated it was \"in the closet somewhere\" and appreciated offer to email a copy which was done.  Code status addressed and son stated as DPOA-HC he is comfortable changing to DNR \"because I think that's what my dad would want and I would want that for myself.\"  Efrain stated that he was waiting for his sister to return home and they would discuss further, but look towards that 48-hour avery for further decision-making.    Active listening, reflection, reminiscing, validation & normalization, and empathic support utilized throughout these encounters.  All questions answered.  PC contact information given.     Discussed with/Updated:  Dr. Joseph, Dr. Chacon, ICU RN aCrly    Plan: PC to continue to follow and provide support/help facilitate decision-making as clinical picture evolves.    Recommendations:  I do not recommend an ethics or hospice consult at this time because family wants to give 48-hours to see pt's situation.    Thank you for allowing Palliative Care to participate in this patient's care. Please feel free to call [phone] with any questions or concerns.  "

## 2019-04-22 NOTE — CARE PLAN
Problem: Nutritional:  Goal: Nutrition support tolerated and meeting greater than 85% of estimated needs  Outcome: MET Date Met: 04/22/19  TF @ goal, tolerating.

## 2019-04-22 NOTE — PROGRESS NOTES
[facility] Nephrology Daily Progress Note    Date of Service  4/22/2019    Chief Complaint  69 y.o. male admitted 4/13/2019 with expressive aphasia.  Found by wife on floor unresponsive in pool of urine and feces with PD catheter uncapped.     Interval Problem Update  4/13/19 - Renal consult.  Ordered PD.  Sent PD fluid for cell count, gram stain, and culture because of possible contamination prior to admission.  4/14/19 - Feels fine.  Speech is now ~normal.   4/15/19 - Feels ok. CCPD is going well. Net UF is 200 mL.  4/16/19 - Pt says he feels fine. Denies any complaints/concerns. Peritoneal fluid negative. Started on dysphagia diet.  4/17/19 - No new overnight renal events. Repeat CT did not show any new stroke. Low drain volume alarm. Patient was repositioned without success. PD cath was aseptically flushed with NS 10 cc. No resistant was felt instiling and withdrawing the NS solution. Patient has no abdominal discomfort.  4/18/19 - No issues with CCPD. Net  mL. Pt denies complaints/concerns. Says he will be going to rehab.  4/19/19 - Disconnected from CCPD this AM 0745, Net UF = 1814, pt is globally aphasic with worsening L sided facial droop, was supposed to tx to rehab, STAT head CT pending   4/20/19 - low UF with PD yesterday, neurology expressed concern regarding BUN given neuro changes, will plan for HD x2-3 days, awaiting line placement  4/21/19 - tolerated HD yesterday, developed neuro changes approx 1930 yesterday, imaging obtained and neurology and cardiology contacted, SAH on imaging, transferred to ICU and anticoag reversed, on nicardipine drip, wife at bedside  4/22/19 - In ICU intubated.  Moves left side.    Review of Systems  Review of Systems   Unable to perform ROS: Acuity of condition        Physical Exam  Temp:  [36.2 °C (97.1 °F)-37.2 °C (99 °F)] 37.2 °C (99 °F)  Pulse:  [] 79  Resp:  [13-28] 24  SpO2:  [91 %-100 %] 99 %    Physical Exam   Constitutional: He appears  well-developed and well-nourished.   HENT:   Head: Normocephalic and atraumatic.   Eyes: Conjunctivae are normal.   Minimally opens eyes   Neck: No tracheal deviation present.   Cardiovascular: Normal rate and regular rhythm.    Pulmonary/Chest: Effort normal and breath sounds normal.   Abdominal: Soft.   PD catheter in place. CDI.   Musculoskeletal: He exhibits no edema or tenderness.   Neurological:   Minimally opens eyes, does not make eye contact, moves left arm easily to adjust blanket   Skin: Skin is warm and dry.       Fluids    Intake/Output Summary (Last 24 hours) at 04/22/19 0954  Last data filed at 04/22/19 0400   Gross per 24 hour   Intake          1022.84 ml   Output                0 ml   Net          1022.84 ml       Laboratory  Recent Labs      04/20/19 2017 04/21/19 0345 04/22/19 0345   WBC  12.6*  10.7  13.7*   RBC  5.02  4.27*  3.55*   HEMOGLOBIN  15.4  13.1*  10.8*   HEMATOCRIT  46.7  40.4*  33.9*   MCV  93.0  94.6  95.5   MCH  30.7  30.7  30.4   MCHC  33.0*  32.4*  31.9*   RDW  45.1  45.7  46.7   PLATELETCT  173  140*  113*   MPV  12.3  12.4  12.8     Recent Labs      04/20/19 2017 04/21/19 0345 04/22/19 0345   SODIUM  135  143  142   POTASSIUM  4.6  5.0  4.7   CHLORIDE  100  105  106   CO2  21  23  20   GLUCOSE  232*  191*  253*   BUN  56*  55*  69*   CREATININE  8.01*  8.71*  10.58*   CALCIUM  11.2*  10.0  9.4     Recent Labs      04/20/19   1149  04/21/19 0345 04/22/19   0345   INR  2.23*  1.43*  1.27*         Recent Labs      04/21/19   0345   TRIGLYCERIDE  275*   HDL  21*   LDL  27       Imaging  reviewed      Assessment/Plan        ESRD on CCPD. Tolerated HD Saturday. Hemodialysis again today.     CVA. SAH. Imaging reviewed. INR reversed. Defer to neurology and cardiology. Wife at bedside.     DM. Per primary service     HTN. Controlled.     CKD MBD: currently NPO, MBD meds held. Phos elevated.       Anemia of CKD: Hgb at goal, no MUNA          Other management per primary  service

## 2019-04-22 NOTE — ASSESSMENT & PLAN NOTE
St. Alex aortic valve  Echo with mean valve gradient of 33 mmHg  Anticoagulation strictly contraindicated for now, resume when okay with neuro team/CNS bleeding risk acceptable when compared to thrombotic complications of mechanical valve without anticoagulation  Cardiology has evaluated the patient

## 2019-04-22 NOTE — PROGRESS NOTES
"69-year-old male patient with end-stage renal disease, on peritoneal dialysis history of mechanical aortic valve presented with acute stroke with hemorrhagic conversion.    Interval History:  Patient now has respiratory failure secondary to progressive neurological worsening, intubated for airway protection.  Sedated.    Physical Exam   Blood pressure 113/59, pulse 79, temperature 37.2 °C (99 °F), temperature source Temporal, resp. rate (!) 24, height 1.803 m (5' 10.98\"), weight 82.8 kg (182 lb 8.7 oz), SpO2 99 %.    Constitutional: Intubated and sedated  HENT: Normocephalic and atraumatic. No scleral icterus.   Neck: No JVD present.   Cardiovascular: Distant heart sounds, mechanical click  Pulmonary/Chest: Coarse breath sounds  Abdominal: S/NT/ND BS+   Musculoskeletal: Exhibits no edema. Pulses present.  Skin: Skin is warm and dry.     ROS: Unable to obtain, patient is intubated      Intake/Output Summary (Last 24 hours) at 04/22/19 1005  Last data filed at 04/22/19 0400   Gross per 24 hour   Intake           858.84 ml   Output                0 ml   Net           858.84 ml       Recent Labs      04/20/19 2017 04/21/19 0345 04/22/19 0345   WBC  12.6*  10.7  13.7*   RBC  5.02  4.27*  3.55*   HEMOGLOBIN  15.4  13.1*  10.8*   HEMATOCRIT  46.7  40.4*  33.9*   MCV  93.0  94.6  95.5   MCH  30.7  30.7  30.4   MCHC  33.0*  32.4*  31.9*   RDW  45.1  45.7  46.7   PLATELETCT  173  140*  113*   MPV  12.3  12.4  12.8     Recent Labs      04/20/19 2017 04/21/19 0345 04/22/19 0345   SODIUM  135  143  142   POTASSIUM  4.6  5.0  4.7   CHLORIDE  100  105  106   CO2  21  23  20   GLUCOSE  232*  191*  253*   BUN  56*  55*  69*   CREATININE  8.01*  8.71*  10.58*   CALCIUM  11.2*  10.0  9.4     Recent Labs      04/20/19   1149  04/21/19 0345 04/22/19 0345   INR  2.23*  1.43*  1.27*             Recent Labs      04/21/19   0345   TRIGLYCERIDE  275*   HDL  21*   LDL  27       No current facility-administered medications " on file prior to encounter.      Current Outpatient Prescriptions on File Prior to Encounter   Medication Sig Dispense Refill   • artificial tears 1.4 % Solution Place 1 Drop in both eyes 4 times a day as needed.     • calcitRIOL (ROCALTROL) 0.25 MCG Cap Take 0.25-0.5 mcg by mouth See Admin Instructions. 0.50 mcg every Monday and Thursday morning  0.25 mcg all other mornings     • vitamin D, Ergocalciferol, (DRISDOL) 29110 units Cap capsule Take 50,000 Units by mouth every Monday.     • insulin glargine (LANTUS) 100 UNIT/ML Solution Inject 35 Units as instructed every evening.     • levothyroxine (SYNTHROID) 125 MCG Tab Take 125 mcg by mouth every morning.     • metoprolol SR (TOPROL XL) 50 MG TABLET SR 24 HR Take 75 mg by mouth every morning.     • pregabalin (LYRICA) 75 MG Cap Take 75 mg by mouth every morning.     • warfarin (COUMADIN) 5 MG Tab Take 2.5-5 mg by mouth every evening. 2.5 mg on Tuesday and Wednesday   5 mg all other days         Current Facility-Administered Medications   Medication Dose Frequency Provider Last Rate Last Dose   • famotidine (PEPCID) injection 20 mg  20 mg DAILY Adithya Arroyo M.D.   Stopped at 04/22/19 0600   • ampicillin/sulbactam (UNASYN) 3 g in  mL IVPB  3 g Q12HRS Aure Seals M.D.   Stopped at 04/22/19 0644   • Respiratory Care per Protocol   Continuous RT Neil Brothers M.D.       • senna-docusate (PERICOLACE or SENOKOT S) 8.6-50 MG per tablet 2 Tab  2 Tab BID Neil Brothers M.D.   2 Tab at 04/22/19 0613    And   • polyethylene glycol/lytes (MIRALAX) PACKET 1 Packet  1 Packet QDAY PRN Neil Brothers M.D.        And   • bisacodyl (DULCOLAX) suppository 10 mg  10 mg QDAY PRN Neil Brothers M.D.       • Pharmacy Consult: Enteral tube insertion - review meds/change route/product selection   PHARMACY TO DOSE Neil Brothers M.D.       • fentaNYL (SUBLIMAZE) injection 25 mcg  25 mcg Q HOUR PRN Neil Brothers M.D.         Or   • fentaNYL (SUBLIMAZE) injection 50 mcg  50 mcg Q HOUR PRN Neil Brothers M.D.        Or   • fentaNYL (SUBLIMAZE) injection 100 mcg  100 mcg Q HOUR PRN Neil Brothers M.D.       • ipratropium-albuterol (DUONEB) nebulizer solution  3 mL Q2HRS PRN (RT) Neil Brothers M.D.       • ipratropium-albuterol (DUONEB) nebulizer solution  3 mL Q4HRS (RT) Neil Brothers M.D.   3 mL at 04/22/19 0628   • propofol (DIPRIVAN) injection  0-80 mcg/kg/min Continuous Neil Brothers M.D.   Stopped at 04/21/19 1645   • amLODIPine (NORVASC) tablet 10 mg  10 mg Q DAY Neil Brothers M.D.   Stopped at 04/22/19 0600   • atorvastatin (LIPITOR) tablet 40 mg  40 mg Nightly Neil Brothers M.D.   40 mg at 04/21/19 2153   • [START ON 4/23/2019] calcitRIOL (ROCALTROL) capsule 0.25 mcg  0.25 mcg Once per day on Sun Tue Wed Fri Sat Neil Brothers M.D.       • calcitRIOL (ROCALTROL) capsule 0.5 mcg  0.5 mcg Once per day on Mon Thu Neil Brothers M.D.   0.5 mcg at 04/22/19 0620   • cyanocobalamin (VITAMIN B-12) tablet 500 mcg  500 mcg DAILY Neil Brothers M.D.   500 mcg at 04/22/19 0613   • ergocalciferol (CALCIFEROL) drops 50,000 Units  50,000 Units Q7 DAYS Neil Brothers M.D.   50,000 Units at 04/22/19 0615   • insulin lispro (HUMALOG) injection 1-6 Units  1-6 Units Q6HRS Neil Brothers M.D.   3 Units at 04/22/19 0622    And   • glucose 4 g chewable tablet 16 g  16 g Q15 MIN PRN Neil Brothers M.D.        And   • dextrose 50% (D50W) injection 25 mL  25 mL Q15 MIN PRN Neil Brothers M.D.       • levETIRAcetam (KEPPRA) tablet 500 mg  500 mg BID Neil Brothers M.D.   500 mg at 04/22/19 0613   • levothyroxine (SYNTHROID) tablet 125 mcg  125 mcg QAM Neil Brothers M.D.   125 mcg at 04/22/19 0618   • niMODipine (NIMOTOP) capsule 60 mg  60 mg Q4HRS Neil Brothers M.D.   60 mg at 04/22/19 0614   • acetaminophen  (TYLENOL) tablet 650 mg  650 mg Q4HRS PRN Neil Brothers M.D.        Or   • acetaminophen (TYLENOL) suppository 650 mg  650 mg Q4HRS PRN Neil Brothers M.D.       • ondansetron (ZOFRAN ODT) dispertab 4 mg  4 mg Q4HRS PRN Neil Brothers M.D.        Or   • ondansetron (ZOFRAN) syringe/vial injection 4 mg  4 mg Q4HRS PRN Neil Brothers M.D.       • famotidine (PEPCID) tablet 20 mg  20 mg DAILY Neil Brothers M.D.   20 mg at 04/22/19 0600   • norepinephrine (LEVOPHED) 8 mg in  mL Infusion  0-30 mcg/min Continuous Neil Brothers M.D. 5.6 mL/hr at 04/21/19 2303 3 mcg/min at 04/21/19 2303   • niCARdipine (CARDENE) 25 mg in  mL Infusion  0-15 mg/hr Continuous Beni Jack III, M.D.   Stopped at 04/21/19 1630   • insulin glargine (LANTUS) injection 10 Units  10 Units Q EVENING Corby Ravi M.D.   10 Units at 04/21/19 1838   • menthol-methyl salicylate (BENGAY) ointment   TID PRN Corby Ravi M.D.       • artificial tears 1.4 % ophthalmic solution 1 Drop  1 Drop 4X/DAY PRN Corby Ravi M.D.       • lidocaine (LIDODERM) 5 % 1 Patch  1 Patch Q24HRS Corby Ravi M.D.   1 Patch at 04/22/19 0618     Last reviewed on 4/13/2019 11:22 AM by Kemi Mcconnell  Medications reviewed    Imaging reviewed    ECHO(1/30/2018):  Left ventricular ejection fraction is visually estimated to be 45%.   Hypokinetic apex. Abnormal septal motion consistent with ventricular   pacing. Per endocardial definition if clinically indicated consider   limited echo with contrast.  Pacer/ICD wire seen in right ventricle. Moderately dilated right   ventricle.  Mild to moderate mitral regurgitation.   Mechanical aortic valve with a mean gradient of 33 mmHg.  Aortic insufficiency present difficult to assess severity  Right ventricular systolic pressure is estimated to be 55 mmHg.  No prior study is available for comparison.    Impressions:  69-year-old male  patient with history of mechanical aortic valve in 1999, dual-chamber permanent pacemaker placement presented with stroke, subarachnoid hemorrhage now with respiratory failure on ventilator support    Recommendations:  Given his neurological status we have to continue to hold Coumadin until his bleeding stabilizes.  If he improves, recommend restarting Coumadin without bridging whenever okay with neurosurgery.    Cardiology will sign off for now please call me back with questions    Discussed with primary physician and bedside nursing.       Cosmo Faustin  Interventional cardiologist  Cell: [phone]

## 2019-04-22 NOTE — PROGRESS NOTES
Critical Care Medicine Interval Note  Note Author: Pérez Chacon M.D.     Name Efrain Martines 1950   Age/Sex 69 y.o. male   MRN [id number]   Code Status Full     After 5PM or if no immediate response to page, please call for cross-coverage  Attending/Team: Dr Joseph/Rey See Patient List for primary contact information  Call [phone] to page    1st Call - Day Intern (R1):   Ines          Reason for interval visit  (Principal Problem)   69 y.o. male who presented 2019 with ESRD, CVA, DM, hypothyroidism, dyslipidemia, mechanical st heather valve on chronic anticoagulation, pace maker for 3rd degree block, prior cerebral aneurysm s/p coiling in . Patient was admitted  for fall and AMS and supra-therapeutic INR 4.8 and his intial images were negative for acute stroke or findings but neurology thought likely new cardio embolic stroke. He was unable to get MRI. Today he had ams and concerns for sepsis he was given fluids for hypotension and a ct head was preformed that showed new SAH. Discussed with neurology, neurosurgery and cardiology and transferred to ICU for monitoring.      Interval Problem Daily Status Update  (24 hours)   Neuro status unimproved from yesterday  Remains on full vent support   Family meeting with palliative this AM, appears son is DPOA and [facility] pt does not want aggressive measures  Cards -> hold coumadin until ok with neurosurg, will sign off  Nephro -> HD today  NS -> hold AC for 1 week minimum, avoid HTN but can stop nimodipine, no intervention will sign off     Review of Systems   Unable to perform ROS: Intubated       Consultants/Specialty  CC/Pulm  Neurosurg  Palliative   Nephro  Cards  PCP: No primary care provider on file.    Disposition  ICU for CVA    Quality Measures  Quality-Core Measures        Physical Exam       Vitals:    19 1000 19 1031 19 1100 19 1200   BP:       Pulse: 63  82 90   Resp: (!) 23   (!) 26 (!) 22   Temp:       TempSrc:       SpO2: 100% 100% 100% 98%   Weight:       Height:         Body mass index is 25.47 kg/m².    Oxygen Therapy:  Pulse Oximetry: 98 %, FiO2%: 50 %, O2 Delivery: Ventilator    Physical Exam   Constitutional: He appears unhealthy.   HENT:   Head: Normocephalic and atraumatic.   Eyes: Pupils are equal, round, and reactive to light.   Cardiovascular: Normal rate and regular rhythm.    Pulmonary/Chest: Effort normal and breath sounds normal.   Abdominal: Soft. He exhibits no distension.   Musculoskeletal: He exhibits no edema.   Neurological:   GCS 6t  Right hemiparesis      Skin: Skin is warm and dry.         Lab Data Review:       4/22/2019  12:25 PM    Recent Labs      04/20/19 2017 04/21/19 0345 04/22/19 0345   SODIUM  135  143  142   POTASSIUM  4.6  5.0  4.7   CHLORIDE  100  105  106   CO2  21  23  20   BUN  56*  55*  69*   CREATININE  8.01*  8.71*  10.58*   MAGNESIUM   --   2.0  1.9   PHOSPHORUS   --   7.9*  8.7*   CALCIUM  11.2*  10.0  9.4       Recent Labs      04/20/19   1148 04/20/19 2017 04/21/19 0345 04/22/19 0345   ALTSGPT  <5   --   <5  <5   ASTSGOT  8*   --   9*  7*   ALKPHOSPHAT  104*   --   93  82   TBILIRUBIN  0.5   --   0.5  0.5   PREALBUMIN   --    --    --   21.0   GLUCOSE  220*  232*  191*  253*       Recent Labs      04/20/19   1149  04/20/19 2017 04/21/19 0345 04/22/19   0345   RBC  4.63*  5.02  4.27*  3.55*   HEMOGLOBIN  14.3  15.4  13.1*  10.8*   HEMATOCRIT  43.3  46.7  40.4*  33.9*   PLATELETCT  136*  173  140*  113*   PROTHROMBTM  24.7*   --   17.5*  16.1*   INR  2.23*   --   1.43*  1.27*       Recent Labs      04/20/19   1148   04/20/19 2017 04/21/19 0345 04/22/19   0345   WBC   --    < >  12.6*  10.7  13.7*   NEUTSPOLYS   --    < >  82.00*  78.80*  82.80*   LYMPHOCYTES   --    < >  7.40*  9.70*  6.80*   MONOCYTES   --    < >  8.30  8.80  9.10   EOSINOPHILS   --    < >  1.30  1.70  0.60   BASOPHILS   --    < >  0.40  0.50   0.30   ASTSGOT  8*   --    --   9*  7*   ALTSGPT  <5   --    --   <5  <5   ALKPHOSPHAT  104*   --    --   93  82   TBILIRUBIN  0.5   --    --   0.5  0.5    < > = values in this interval not displayed.       Assessment/Plan     ESRD (end stage renal disease) ([facility])  Previously got PD but advanced to HD   Nephrology following, appreciate their recs    Hypertension  Stable  Continue amlodipine  DC nimodipine after clearing with NS    Diabetes ([facility])  A1C 9.3  Glargine + SSI with hypoglycemic protocol    Hypothyroidism  Continue home synthroid    History of mechanical fall without injury  GLF day prior to admission and was found down the morning of admission.   Fall/Seizure precautions  PT/OT: recommend acute rehab prior to d/c home  Rehab placement on hold for now given deterioration of condition    Hyperlipidemia  Continue Atorvastatin     CVA (cerebral vascular accident) ([facility])  Intubated due to inability to protect airway  Severe aphasia and dense R hemiparesis   SBP goal <140  Continue Atorvastatin    Hold AC  DC nimodipine due to hypotension  Physiatry: [facility]   Rehab placement on hold now due to acute deterioration  NS following, no plan for surg intervention, okay for more liberal BP goal and to DC nimodipine  Palliative following plan for family meeting    Chronic anticoagulation  For mechanical  St. Alex aortic valve, placed in 1999  INR 4.80 on admission  Cards following, hold AC until cleared with NS  NS following, hold AC for minimum 1 week    Leukocytosis  Bronch on 4/21 showed thick secretions  Continue Unasyn  Monitor BAL and cx     High anion gap metabolic acidosis  Mild likely 2/2 uremia in this ESRD patient.  Improving with HD  Nephology following     Elevated troponin  Not concerning for ACS 2/2 ESRD     Hepatitis A  LFTs wnl  CTM    Acute on chronic systolic heart failure (HCC)  EF 45% with mod RV dysfunction, RVSP 55  Avoid excessive fluids  BP control and monitoring

## 2019-04-22 NOTE — ASSESSMENT & PLAN NOTE
Intubated for airway protection and inability to clear secretions on 4/21  RT protocols  Ventilator bundle  Remains inappropriate for weaning/extubation  Serial ABG/chest x-ray/ventilator mechanics review, acceptable at this time  Limit sedation given poor LOC, receiving as needed IV narcotics

## 2019-04-22 NOTE — ASSESSMENT & PLAN NOTE
Sputum culture pending  Continue intravenous Unasyn, plan 7-day course  Serial CBC  Serial chest x-ray  Swallow eval when extubated

## 2019-04-23 NOTE — PROGRESS NOTES
Critical Care Progress Note    Date of admission  4/13/2019    Chief Complaint  69 y.o. male admitted 4/13/2019 with altered mental status.    Hospital Course    This gentleman was admitted to the ICU with worsening respiratory failure, worsening mental status and a stroke associated with an intracranial hemorrhage.      Interval Problem Update  Reviewed last 24 hour events:    Vent day #3  PEEP 8 30%  CXR no change Min ATX LLL  Secretions small mod  RSBI high  ST/paced - 120s  SBp 140s  Off NE/Nicaripine HARSH  No change LOC, not following  Opens eyes - some spont L sided movements, withdraws on R  TF goal 40  Min UO  HD yesterday tolerated  Unasyn 4/4  BAL NG so far  WBC better     Stop ABX day 5  Updated wife at bedside again  I 180 protocols    Patient assessed and reassessed  Patient developing bradycardia, no change in blood pressure  Oxygenation remained good throughout the day  LOC remains worse today than yesterday  Reviewed CODE STATUS again with wife at the bedside     YESTERDAY  Intubated for airway protection yesterday  FOB yesterday  Not following, motor limitations  No MRI - has pacer  NE titrating 3  TF goal  Low UO - CAPD -> HD  Vent day #2  50% PEEP 8  CXR ET ok LLL slt better ATX  Secretions min  Unasyn  BC and BAL pending  Plt 113/Hgb 11  SSI 6 units/24 hrs    Stop Nimodipine if ok with NS?  Palliative to see    D/w NS/resident - stop Nimtop  D/w RN/CM - DNR per advanced directive at [facility]?    D/w Wife of 8 years at bedside    D/w Palliative Care APN  Who d/w son POA/wife  Wife in agreement with son  Full treat - DNAR - I ok    Review of Systems  Review of Systems   Unable to perform ROS: Intubated        Vital Signs for last 24 hours   Temp:  [36.9 °C (98.4 °F)-37.3 °C (99.2 °F)] 36.9 °C (98.4 °F)  Pulse:  [0-129] 0  Resp:  [20-34] 22  SpO2:  [41 %-98 %] 41 %    Hemodynamic parameters for last 24 hours       Respiratory Information for the last 24 hours  [place] Vent Mode: APVCMV  Rate  (breaths/min): 22  Vt Target (mL): 430  PEEP/CPAP: 8  FiO2: 40  P Support: 5  P MEAN: 12  Control VTE (exp VT): 428    Physical Exam   Physical Exam   Constitutional: He appears well-nourished. He has a sickly appearance. No distress. He is intubated and restrained.   HENT:   Head: Normocephalic and atraumatic.   Right Ear: External ear normal.   Left Ear: External ear normal.   Nose: Nose normal.   Mouth/Throat: Oropharynx is clear and moist. Mucous membranes are not dry and not cyanotic.   Eyes: Pupils are equal, round, and reactive to light. Conjunctivae are normal. Right eye exhibits no discharge. Left eye exhibits no discharge.   Neck: Normal range of motion. Neck supple. No JVD present. No tracheal deviation present. No thyromegaly present.   Cardiovascular: Intact distal pulses.  Exam reveals no gallop and no friction rub.    No murmur heard.  ST, more freq pacing this AM   Pulmonary/Chest: He is intubated. No respiratory distress. He has no wheezes. He has rhonchi. He has rales (Significant coarse crackles bilaterally).   Abdominal: Soft. Bowel sounds are normal. He exhibits no distension. There is no hepatosplenomegaly. There is no tenderness. There is no rebound and no CVA tenderness.   Musculoskeletal: He exhibits no edema or tenderness.   Lymphadenopathy:     He has no cervical adenopathy.   Neurological: He is unresponsive. No cranial nerve deficit. He exhibits abnormal muscle tone (flaccid R side).   Unresponsive, spont moves L side some     Skin: Skin is warm and dry. No rash noted. He is not diaphoretic. No cyanosis or erythema. Nails show no clubbing.   Psychiatric:   Unable to assess   Vitals reviewed.      Medications  Current Facility-Administered Medications   Medication Dose Route Frequency Provider Last Rate Last Dose   • insulin regular human (HUMULIN/NOVOLIN R) 62.5 Units in  mL infusion per protocol  0-29 Units/hr Intravenous Continuous Pérez Chacon M.D. 32 mL/hr at 04/23/19  1431 8 Units/hr at 04/23/19 1431   • dextrose 50% (D50W) injection 25-50 mL  12.5-25 g Intravenous PRN Pérez Chacon M.D.       • artificial tear ointment (REFRESH,LACRI-LUBE) 1 Application  1 Application Both Eyes Q8HRS Rusty Joseph M.D.       • famotidine (PEPCID) injection 20 mg  20 mg Intravenous DAILY Adithya Arroyo M.D.   Stopped at 04/22/19 0600   • ampicillin/sulbactam (UNASYN) 3 g in  mL IVPB  3 g Intravenous Q12HRS Pérez Chacon M.D.   Stopped at 04/23/19 0700   • Respiratory Care per Protocol   Nebulization Continuous RT Neil Brothers M.D.       • senna-docusate (PERICOLACE or SENOKOT S) 8.6-50 MG per tablet 2 Tab  2 Tab Enteral Tube BID Neil Brothers M.D.   Stopped at 04/22/19 1800    And   • polyethylene glycol/lytes (MIRALAX) PACKET 1 Packet  1 Packet Enteral Tube QDAY PRN Neil Brothers M.D.        And   • bisacodyl (DULCOLAX) suppository 10 mg  10 mg Rectal QDAY PRN Neil Brothers M.D.       • Pharmacy Consult: Enteral tube insertion - review meds/change route/product selection   Other PHARMACY TO DOSE Neil Brothers M.D.       • fentaNYL (SUBLIMAZE) injection 25 mcg  25 mcg Intravenous Q HOUR PRN Neil Brothers M.D.        Or   • fentaNYL (SUBLIMAZE) injection 50 mcg  50 mcg Intravenous Q HOUR PRN Neil Brothers M.D.   50 mcg at 04/23/19 1000    Or   • fentaNYL (SUBLIMAZE) injection 100 mcg  100 mcg Intravenous Q HOUR PRN Neil Brothers M.D.   100 mcg at 04/23/19 1137   • ipratropium-albuterol (DUONEB) nebulizer solution  3 mL Nebulization Q2HRS PRN (RT) Neil Brothers M.D.       • ipratropium-albuterol (DUONEB) nebulizer solution  3 mL Nebulization Q4HRS (RT) Neil Brothers M.D.   3 mL at 04/23/19 1427   • propofol (DIPRIVAN) injection  0-80 mcg/kg/min Intravenous Continuous Neil Brothers M.D.   Stopped at 04/21/19 1645   • amLODIPine (NORVASC) tablet 10 mg  10 mg Enteral Tube Q DAY  Neil Brothers M.D.   10 mg at 04/23/19 0632   • atorvastatin (LIPITOR) tablet 40 mg  40 mg Enteral Tube Nightly Neil Brothers M.D.   40 mg at 04/22/19 2000   • calcitRIOL (ROCALTROL) capsule 0.25 mcg  0.25 mcg Enteral Tube Once per day on Sun Tue Wed Fri Sat Neil Brothers M.D.   0.25 mcg at 04/23/19 0630   • calcitRIOL (ROCALTROL) capsule 0.5 mcg  0.5 mcg Enteral Tube Once per day on Mon Thu Neil Brothers M.D.   0.5 mcg at 04/22/19 0620   • cyanocobalamin (VITAMIN B-12) tablet 500 mcg  500 mcg Enteral Tube DAILY Neil Brothers M.D.   500 mcg at 04/23/19 0631   • ergocalciferol (CALCIFEROL) drops 50,000 Units  50,000 Units Enteral Tube Q7 DAYS Neil Brothers M.D.   50,000 Units at 04/22/19 0615   • glucose 4 g chewable tablet 16 g  16 g Oral Q15 MIN PRN Neil Brothers M.D.        And   • dextrose 50% (D50W) injection 25 mL  25 mL Intravenous Q15 MIN PRN Neil Brothers M.D.       • levETIRAcetam (KEPPRA) tablet 500 mg  500 mg Enteral Tube BID Neil Brothers M.D.   500 mg at 04/23/19 0631   • levothyroxine (SYNTHROID) tablet 125 mcg  125 mcg Enteral Tube QAM Neil Brothers M.D.   125 mcg at 04/23/19 0631   • acetaminophen (TYLENOL) tablet 650 mg  650 mg Enteral Tube Q4HRS PRN Neil Brothers M.D.   650 mg at 04/22/19 2000    Or   • acetaminophen (TYLENOL) suppository 650 mg  650 mg Rectal Q4HRS PRN Neil Brothers M.D.       • ondansetron (ZOFRAN ODT) dispertab 4 mg  4 mg Enteral Tube Q4HRS PRN Neil Brothers M.D.        Or   • ondansetron (ZOFRAN) syringe/vial injection 4 mg  4 mg Intravenous Q4HRS PRN Neil Brothers M.D.       • famotidine (PEPCID) tablet 20 mg  20 mg Enteral Tube DAILY Neil Brothers M.D.   20 mg at 04/23/19 0632   • norepinephrine (LEVOPHED) 8 mg in  mL Infusion  0-30 mcg/min Intravenous Continuous Neil Brothers M.D.   Stopped at 04/22/19 0940   • menthol-methyl  salicylate (BENGAY) ointment   Topical TID PRN Corby Ravi M.D.       • lidocaine (LIDODERM) 5 % 1 Patch  1 Patch Transdermal Q24HRS Corby Ravi M.D.   1 Patch at 04/23/19 0630     Current Outpatient Prescriptions   Medication Sig Dispense Refill   • acetaminophen (TYLENOL) 325 MG Tab Take 2 Tabs by mouth every 6 hours as needed. 30 Tab 0   • amLODIPine (NORVASC) 10 MG Tab Take 1 Tab by mouth every day. 30 Tab 0   • famotidine (PEPCID) 20 MG Tab Take 1 Tab by mouth 2 Times a Day. 60 Tab 0   • insulin lispro (HUMALOG) 100 UNIT/ML Solution Inject 1-6 Units as instructed 3 times a day before meals. 10 mL 0   • menthol-methyl salicylate (BENGAY) Ointment Apply 1 Drop to affected area(s) 3 times a day as needed. 1 Tube 1   • atorvastatin (LIPITOR) 40 MG Tab Take 40 mg by mouth every evening.     • cyanocobalamin (VITAMIN B-12) 500 MCG Tab Take 500 mcg by mouth every day.     • lidocaine (LIDODERM) 5 % Patch Apply 1 Patch to skin as directed every 24 hours. Neck and Lower back     • lisinopril (PRINIVIL) 2.5 MG Tab Take 2.5 mg by mouth every 48 hours.     • sevelamer (RENAGEL) 800 MG Tab Take 800 mg by mouth 3 times a day, with meals.     • traZODone (DESYREL) 50 MG Tab Take 50 mg by mouth every evening.     • artificial tears 1.4 % Solution Place 1 Drop in both eyes 4 times a day as needed.     • calcitRIOL (ROCALTROL) 0.25 MCG Cap Take 0.25-0.5 mcg by mouth See Admin Instructions. 0.50 mcg every Monday and Thursday morning  0.25 mcg all other mornings     • vitamin D, Ergocalciferol, (DRISDOL) 69709 units Cap capsule Take 50,000 Units by mouth every Monday.     • insulin glargine (LANTUS) 100 UNIT/ML Solution Inject 35 Units as instructed every evening.     • levothyroxine (SYNTHROID) 125 MCG Tab Take 125 mcg by mouth every morning.     • metoprolol SR (TOPROL XL) 50 MG TABLET SR 24 HR Take 75 mg by mouth every morning.     • pregabalin (LYRICA) 75 MG Cap Take 75 mg by mouth every morning.     •  warfarin (COUMADIN) 5 MG Tab Take 2.5-5 mg by mouth every evening. 2.5 mg on Tuesday and Wednesday   5 mg all other days         Fluids    Intake/Output Summary (Last 24 hours) at 04/23/19 2056  Last data filed at 04/23/19 1400   Gross per 24 hour   Intake           907.33 ml   Output              600 ml   Net           307.33 ml       Laboratory  Recent Labs      04/21/19   1741  04/22/19   0439  04/23/19   0451   ISTATAPH  7.395*  7.354*  7.455   ISTATAPCO2  31.9  36.9  23.3*   ISTATAPO2  225*  222*  95*   ISTATATCO2  20  22  17*   AABUCKG4YWG  100*  100*  98   ISTATARTHCO3  19.5  20.6  16.4*   ISTATARTBE  -5*  -4  -6*   ISTATTEMP  97.4 F  99.1 F  100.8 F   ISTATFIO2  100  60  30   ISTATSPEC  Arterial  Arterial  Arterial   ISTATAPHTC  7.404  7.350*  7.437   EMGDBITU6AL  222*  224*  102*         Recent Labs      04/21/19 0345 04/22/19   0345 04/23/19   0300   SODIUM  143  142  137   POTASSIUM  5.0  4.7  4.7   CHLORIDE  105  106  99   CO2  23  20  19*   BUN  55*  69*  59*   CREATININE  8.71*  10.58*  9.63*   MAGNESIUM  2.0  1.9  2.0   PHOSPHORUS  7.9*  8.7*  6.6*   CALCIUM  10.0  9.4  10.8*     Recent Labs      04/21/19 0345 04/22/19 0345 04/23/19   0300   ALTSGPT  <5  <5   --    ASTSGOT  9*  7*   --    ALKPHOSPHAT  93  82   --    TBILIRUBIN  0.5  0.5   --    PREALBUMIN   --   21.0   --    GLUCOSE  191*  253*  363*     Recent Labs      04/21/19 0345 04/22/19 0345  04/23/19   0300   WBC  10.7  13.7*  11.5*   NEUTSPOLYS  78.80*  82.80*  82.80*   LYMPHOCYTES  9.70*  6.80*  5.70*   MONOCYTES  8.80  9.10  10.20   EOSINOPHILS  1.70  0.60  0.30   BASOPHILS  0.50  0.30  0.50   ASTSGOT  9*  7*   --    ALTSGPT  <5  <5   --    ALKPHOSPHAT  93  82   --    TBILIRUBIN  0.5  0.5   --      Recent Labs      04/21/19   0345  04/22/19   0345  04/23/19   0300   RBC  4.27*  3.55*  4.19*   HEMOGLOBIN  13.1*  10.8*  12.8*   HEMATOCRIT  40.4*  33.9*  40.7*   PLATELETCT  140*  113*  108*   PROTHROMBTM  17.5*  16.1*  16.4*    INR  1.43*  1.27*  1.31*       Imaging  X-Ray:  I have personally reviewed the images and compared with prior images. and My impression is: Prominent interstitium    Assessment/Plan  * Acute hypoxemic respiratory failure (HCC)   Assessment & Plan    Intubated for airway protection and inability to clear secretions on 4/21  RT protocols  Ventilator bundle  Remains inappropriate for weaning/extubation  Serial ABG/chest x-ray/ventilator mechanics review, acceptable at this time  Limit sedation given poor LOC, receiving as needed IV narcotics     SAH (subarachnoid hemorrhage) (HCC)   Assessment & Plan    Neurosurgery evaluation noted  Frequent neuro checks  Anticoagulation reversed and being held  Follow-up imaging as needed, cannot do MRI with pacer  Consider follow-up CT especially if neurologically does not improve, may need follow-up CT scan brain later today or in a.m.  Consider follow-up with neurology later this week as well  Limit sedation  PT/OT when clinically appropriate     CVA (cerebral vascular accident) ([facility])- (present on admission)   Assessment & Plan    CTA on 4/20 with small focal filling defect in proximal left M2 branch with adjacent reconstitution  Subtle left posterior frontal and parietal subarachnoid hemorrhage - no need for surgical intervention per Dr. Jack  Anticoagulation with Coumadin reversed with PCC and vitamin K - post treatment TEG with platelet mapping shows no evidence of coagulopathy  Anticoagulants are strictly contraindicated for now  Hourly neuro checks ongoing  Strict blood pressure control with goal SBP less than 140  Discontinue nimodipine  Continue high intensity statin  PT OT when clinically appropriate  Limit sedation  Intubated 4/21 to protect the airway  Resume anticoagulation for mechanical heart valve when okay with neurology/neurosurgery, weight perhaps 1-2 weeks, repeat CT at that time     Chronic systolic (congestive) heart failure (HCC)   Assessment & Plan    EF  45%  Forced diuresis as needed  Monitor clinical signs and symptoms of heart failure  Judicious fluid management     Aspiration pneumonia ([facility])   Assessment & Plan    Sputum culture pending  Continue intravenous Unasyn, plan 7-day course  Serial CBC  Serial chest x-ray  Swallow eval when extubated     Atelectasis   Assessment & Plan    Aggressive pulmonary toilet, repeat bronchoscopy as needed  Serial chest x-ray  Mobilize as clinically tolerated  Add mucolytic's as necessary     Elevated troponin- (present on admission)   Assessment & Plan    Demand ischemia?  Secondary to CNS injury?  Reevaluate if becomes clinically appropriate     Chronic anticoagulation- (present on admission)   Assessment & Plan    [place] mechanical aortic valve  Coumadin reversed with PCC and vitamin K  TEG with platelet mapping confirms successful reversal  Anticoagulants strictly contraindicated for now  Resume anticoagulation when clinically appropriate, may need to wait 1 to 2 weeks post initial abnormal CT with repeat CT at that time and conversations with neurology/neurosurgery     Diabetes ([facility])- (present on admission)   Assessment & Plan    Blood sugar trending up despite SSI  Initiate insulin 180 protocols with continuous infusion of insulin  Hypoglycemia protocols as needed     Hypertension- (present on admission)   Assessment & Plan    Strict blood pressure control with goal SBP less than 140  Titrate nicardipine drip as needed  Monitor for the need for arterial line  Continue amlodipine, 10 mg daily  Adjust enteral regimen and IV as needed regimen daily with clinical pharmacist     ESRD (end stage renal disease) ([facility])- (present on admission)   Assessment & Plan    Continue HD  Renal dose medications  Avoid nephrotoxins  Judicious fluid management  Serial BMP and volume assessment, daily       Hepatitis A- (present on admission)   Assessment & Plan    Monitor     High anion gap metabolic acidosis- (present on admission)    Assessment & Plan    Optimize electrolytes  Monitor for infectious or other etiologies for this abnormality  Hyperglycemia and acidosis slight worse today, starting insulin drip  Monitor for the need for bicarb intravenously     Leukocytosis- (present on admission)   Assessment & Plan    Monitor     Anemia- (present on admission)   Assessment & Plan    No bleeding clinically but mild anemia present again, query hydration, off anticoagulants  Serial CBC ongoing, hemoglobin unchanged  Transfuse per conservative policy, hemoglobin greater than 7 ideally, using hemoglobin of 8 for significant ischemia/bleeding     Complete heart block (HCC)- (present on admission)   Assessment & Plan    History of pacemaker, pacer appears to be functioning adequately  Monitor rhythm  Optimize electrolytes     H/O aortic valve replacement- (present on admission)   Assessment & Plan    [place] aortic valve  Echo with mean valve gradient of 33 mmHg  Anticoagulation strictly contraindicated for now, resume when okay with neuro team/CNS bleeding risk acceptable when compared to thrombotic complications of mechanical valve without anticoagulation  Cardiology has evaluated the patient     Obstructive sleep apnea- (present on admission)   Assessment & Plan    History of TARIK, reassess when extubated, currently intubated and airway controlled  Monitor     Hypothyroidism- (present on admission)   Assessment & Plan    Continue levothyroxine, 125 mcg daily  Monitor     Hyperlipidemia- (present on admission)   Assessment & Plan    Continue statin  Diet          VTE:  Heparin  Ulcer: H2 Antagonist  Lines: Central Line  Ongoing indication addressed and Leach Catheter  Ongoing indication addressed    I have performed a physical exam and reviewed and updated ROS and Plan today (4/23/2019). In review of yesterday's note (4/22/2019), there are no changes except as documented above.     Patient remains critically ill.  Patient remains on full vent  support and inappropriate for weaning.  Neurologically perhaps a little bit worse today, monitor for need for follow-up CT scan.  Blood pressure acceptable, resume nicardipine infusion if develops hypertension again, resume norepinephrine if develops hypotension again.  Blood sugars elevated and trending up, initiating insulin 180 protocols with continuous infusion of insulin and q. one hour blood sugar testing.  CODE STATUS and condition reviewed with patient's wife at the bedside at length.  Patient's prognosis is unfortunately very poor.  Patient high risk for significant increased morbidity mortality without above critical care interventions.    Discussed patient condition and risk of morbidity and/or mortality with Family, RN, RT, Pharmacy, [employer] [facility] resident, Charge nurse / hot rounds and Palliative care APN     The patient remains critically ill.  Critical care time = 45 minutes in directly providing and coordinating critical care and extensive data review.  No time overlap and excludes procedures.    Pulmonary and Critical Care Medicine

## 2019-04-23 NOTE — ASSESSMENT & PLAN NOTE
Neurosurgery evaluation noted  Frequent neuro checks  Anticoagulation reversed and being held  Follow-up imaging as needed, cannot do MRI with pacer  Consider follow-up CT especially if neurologically does not improve, may need follow-up CT scan brain later today or in a.m.  Consider follow-up with neurology later this week as well  Limit sedation  PT/OT when clinically appropriate

## 2019-04-23 NOTE — ASSESSMENT & PLAN NOTE
Aggressive pulmonary toilet, repeat bronchoscopy as needed  Serial chest x-ray  Mobilize as clinically tolerated  Add mucolytic's as necessary

## 2019-04-23 NOTE — CARE PLAN
Problem: Ventilation Defect:  Goal: Ability to achieve and maintain unassisted ventilation or tolerate decreased levels of ventilator support    Intervention: Support and monitor invasive and noninvasive mechanical ventilation  Adult Ventilation Update    Total Vent Days: 3  Vent: APVCMV 22\430\+8\30%    Patient Lines/Drains/Airways Status    Active Airway     Name: Placement date: Placement time: Site: Days:    Airway ETT Oral 8.0 04/21/19   1640   Oral   3                #FVC / Vital Capacity (liters) :  (not following) (04/22/19 1643)  NIF (cm H2O) :  (not following) (04/22/19 1643)  Rapid Shallow Breathing Index (RR/VT): 31 (04/22/19 1643)  Plateau Pressure (Q Shift): 16 (04/23/19 0216)  Static Compliance (ml / cm H2O): 145 (04/22/19 1643)    Patient failed trials because of Barriers to Wean: No Order (04/22/19 0628)  Barriers to SBT Weaning Trial Stopped due to:: Pt weaned for 1 hour and returned to rest settings per protocol (04/22/19 1643)  Length of Weaning Trial Length of Weaning Trial (Hours): 1 (04/22/19 1643)    Cough: Non Productive (04/23/19 0216)  Sputum Amount: Scant (04/23/19 0216)  Sputum Color: White;Clear (04/23/19 0216)  Sputum Consistency: Thin (04/23/19 0216)    Mobility  Level of Mobility: Level II (04/22/19 2000)  Activity Performed: Unable to mobilize (04/22/19 2000)  Time Activity Tolerated: 17 min (04/20/19 1700)  Distance Per Occurrence (ft.): 0 feet (04/20/19 1700)  # of Times Distance was Traveled: 0 (04/20/19 1700)  Assistance / Tolerance: Assistance of Two or More (04/22/19 1200)  Pt Calls for Assistance: Yes (04/20/19 1700)  Staff Present for Mobilization: RN (04/22/19 1200)  Gait: Unsteady (04/20/19 1700)  Assistive Devices: Hand held assist;Rails (04/20/19 1700)  Reason Not Mobilized: Unstable condition (04/22/19 2000)  Mobilization Comments: rass -3, neuro status  (04/22/19 2000)    Events/Summary/Plan: Duo Q4. Pt currently stable on vent.

## 2019-04-23 NOTE — PROGRESS NOTES
Review of systems:  Neuro: w/d to pain x 4 ext, very difficult to get to w/d on R side, no gag, cough very weak, no corneal R eye, positive L eye, no eye movement, does not track, eyes midline with no dolls eyes noted.  Cardiac: ***  Resp: ***  GI: ***  : ***  Skin: ***    Acute overnight events: ***    Patient primary concern: ***    Family primary concern: ***

## 2019-04-23 NOTE — ASSESSMENT & PLAN NOTE
No bleeding clinically but mild anemia present again, query hydration, off anticoagulants  Serial CBC ongoing, hemoglobin unchanged  Transfuse per conservative policy, hemoglobin greater than 7 ideally, using hemoglobin of 8 for significant ischemia/bleeding

## 2019-04-23 NOTE — ASSESSMENT & PLAN NOTE
Optimize electrolytes  Monitor for infectious or other etiologies for this abnormality  Hyperglycemia and acidosis slight worse today, starting insulin drip  Monitor for the need for bicarb intravenously

## 2019-04-23 NOTE — PALLIATIVE CARE
Spiritual Care Note    Patient Information     Patient's Name: Efrain Martines   MRN: [id number]    YOB: 1950   Age and Gender: 69 y.o. male   Unit: [facility]   Room (and Bed): R104   Ethnicity or Nationality: white   Primary Language: English   Congregation/Spiritual Preference: [employer]   Place of Residence: [place], [place]   Family Present: wife, Ameena   Medical Diagnoses: ESRD   hypertension  diabetes   hypothyroidism  history of mechanical fall without injury  hyperlipidemia  CVA  leukocytosis  high anion gap metabolic acidosis  elevated troponin  hepatitis A  acute on chronic systolic heart failure    Code Status: DNAR, I OK    Date of Admission: 4/13/2019   Length of Stay: 10 days      Spiritual Screen Results  Is your spiritual health or inner well-being important to you as you cope with your medical condition?     No  Would you like to receive a visit from our Spiritual Care team or your own Restorationist or spiritual leader?     No  Was spiritual care education provided to the patient?     Declined     Request Information  Nature of the Visit:     Initial, On Shift  Continue Visiting:     No  Crisis Visit:      Patient Actively Dying/EOL  Consult:      Palliative Care Team  Referral from/Origin of Request:   Verbal, Staff, Phone  Congregation Needs:     Prayer  Ritual Needs:      Anointing, EOL Ritual    Encounter Information  Visited with:      wife  Observations/Symptoms:   tearful  Interacton/Conversation:    Patient's wife asked me to pray and and anoint the patient.  Assessment:      Distress  Distress:      Grief/Loss/Bereavement  Interventions:      compassionate presence, emotional support, final prayer of commendation, anointing  Outcomes:      Emotional Release, Progress with Grief, Spiritual Comfort  Total Time: 10 minutes    Spiritual Care Provider Information  Title of Spiritual Care Provider:    Name of Spiritual Care Provider:   GARRY Canela  Phone Number:      [phone]   E-Mail:       [email or web address]

## 2019-04-23 NOTE — DISCHARGE PLANNING
Pt has , met with wife to answer questions and provide emotional support. Provided with Helpful Information at this Difficult Time brochure. Asked unit clerk to order bereavement tray for family.

## 2019-04-23 NOTE — PROGRESS NOTES
[facility] Nephrology Daily Progress Note    Date of Service  4/23/2019    Chief Complaint  69 y.o. male admitted 4/13/2019 with expressive aphasia.  Found by wife on floor unresponsive in pool of urine and feces with PD catheter uncapped.     Interval Problem Update  4/13/19 - Renal consult.  Ordered PD.  Sent PD fluid for cell count, gram stain, and culture because of possible contamination prior to admission.  4/14/19 - Feels fine.  Speech is now ~normal.   4/15/19 - Feels ok. CCPD is going well. Net UF is 200 mL.  4/16/19 - Pt says he feels fine. Denies any complaints/concerns. Peritoneal fluid negative. Started on dysphagia diet.  4/17/19 - No new overnight renal events. Repeat CT did not show any new stroke. Low drain volume alarm. Patient was repositioned without success. PD cath was aseptically flushed with NS 10 cc. No resistant was felt instiling and withdrawing the NS solution. Patient has no abdominal discomfort.  4/18/19 - No issues with CCPD. Net  mL. Pt denies complaints/concerns. Says he will be going to rehab.  4/19/19 - Disconnected from CCPD this AM 0745, Net UF = 1814, pt is globally aphasic with worsening L sided facial droop, was supposed to tx to rehab, STAT head CT pending   4/20/19 - low UF with PD yesterday, neurology expressed concern regarding BUN given neuro changes, will plan for HD x2-3 days, awaiting line placement  4/21/19 - tolerated HD yesterday, developed neuro changes approx 1930 yesterday, imaging obtained and neurology and cardiology contacted, SAH on imaging, transferred to ICU and anticoag reversed, on nicardipine drip, wife at bedside  4/22/19 - In ICU intubated.  Moves left side. Dialyzed.  4/23/19 - In ICU.  Intubated.    Review of Systems  Review of Systems   Unable to perform ROS: Acuity of condition        Physical Exam  Temp:  [36.7 °C (98.1 °F)-37.3 °C (99.1 °F)] 37.2 °C (99 °F)  Pulse:  [] 53  Resp:  [14-32] 26  SpO2:  [90 %-100 %] 90 %    Physical  Exam   Constitutional: He appears well-developed and well-nourished.   HENT:   Head: Normocephalic and atraumatic.   Eyes: Conjunctivae are normal.   Minimally opens eyes   Neck: No tracheal deviation present.   Cardiovascular: Normal rate and regular rhythm.    Pulmonary/Chest: Effort normal and breath sounds normal.   Abdominal: Soft.   PD catheter in place. CDI.   Musculoskeletal: He exhibits no edema or tenderness.   Neurological:   Minimally opens eyes, does not make eye contact, moves left arm easily to adjust blanket   Skin: Skin is warm and dry.       Fluids    Intake/Output Summary (Last 24 hours) at 04/23/19 0929  Last data filed at 04/23/19 0600   Gross per 24 hour   Intake          1051.73 ml   Output             3100 ml   Net         -2048.27 ml       Laboratory  Recent Labs      04/21/19 0345 04/22/19 0345 04/23/19   0300   WBC  10.7  13.7*  11.5*   RBC  4.27*  3.55*  4.19*   HEMOGLOBIN  13.1*  10.8*  12.8*   HEMATOCRIT  40.4*  33.9*  40.7*   MCV  94.6  95.5  97.1   MCH  30.7  30.4  30.5   MCHC  32.4*  31.9*  31.4*   RDW  45.7  46.7  48.0   PLATELETCT  140*  113*  108*   MPV  12.4  12.8  13.3*     Recent Labs      04/21/19 0345 04/22/19 0345 04/23/19   0300   SODIUM  143  142  137   POTASSIUM  5.0  4.7  4.7   CHLORIDE  105  106  99   CO2  23  20  19*   GLUCOSE  191*  253*  363*   BUN  55*  69*  59*   CREATININE  8.71*  10.58*  9.63*   CALCIUM  10.0  9.4  10.8*     Recent Labs      04/21/19 0345 04/22/19   0345  04/23/19   0300   INR  1.43*  1.27*  1.31*         Recent Labs      04/21/19 0345 04/23/19   0300   TRIGLYCERIDE  275*  195*   HDL  21*   --    LDL  27   --        Imaging  reviewed      Assessment/Plan        ESRD - was on CCPD. Switched to hemodialysis. Hemodialysis again today.     CVA. SAH. Imaging reviewed. INR reversed. Defer to neurology and cardiology.     DM. Per primary service     HTN. Controlled.     CKD MBD: currently NPO, MBD meds held. Phos elevated.       Anemia  of CKD: Hgb at goal, no MUNA          Other management per primary service

## 2019-04-23 NOTE — ASSESSMENT & PLAN NOTE
History of pacemaker, pacer appears to be functioning adequately  Monitor rhythm  Optimize electrolytes

## 2019-04-23 NOTE — CARE PLAN
Problem: Mobility  Goal: Risk for activity intolerance will decrease    Intervention: Assess and monitor signs of activity intolerance  ROM performed, pt unable to participate in mobility

## 2019-04-23 NOTE — DISCHARGE SUMMARY
Called to see patient for asystole on telemetry. Pt code status DNAR, I ok so no attempt at cardiac resuscitation was attempted in respect of his wishes. On exam the patient did not respond to verbal or physical stimuli. Absent heart sounds, 100% dependent on ventilator. Absent peripheral pulses. Pupils are fixed and dilated. Corneal reflexes absent. Magnet acquired for PM deactivation. Ventilator turned off and ET tube removed. Patient pronounced dead at 1515 on 04/23/19. Dr. Joseph notified. Wife present and notified.

## 2019-04-23 NOTE — PROGRESS NOTES
Critical Care Medicine Interval Note  Note Author: Pérez Chacon M.D.     Name Efrain Martines 1950   Age/Sex 69 y.o. male   MRN [id number]   Code Status Full     After 5PM or if no immediate response to page, please call for cross-coverage  Attending/Team: Dr Joseph/Rey See Patient List for primary contact information  Call [phone] to page    1st Call - Day Intern (R1):   Ines          Reason for interval visit  (Principal Problem)   69 y.o. male who presented 2019 with ESRD, CVA, DM, hypothyroidism, dyslipidemia, mechanical st heather valve on chronic anticoagulation, pace maker for 3rd degree block, prior cerebral aneurysm s/p coiling in . Patient was admitted  for fall and AMS and supra-therapeutic INR 4.8 and his intial images were negative for acute stroke or findings but neurology thought likely new cardio embolic stroke. He was unable to get MRI. Today he had ams and concerns for sepsis he was given fluids for hypotension and a ct head was preformed that showed new SAH. Discussed with neurology, neurosurgery and cardiology and transferred to ICU for monitoring.      Interval Problem Daily Status Update  (24 hours)   Neuro status declining  Tolerating SBTs  Family plans to reassess GOC on   Holding AC  Nephro following  Sugars elevated above goal  CXR stable    Review of Systems   Unable to perform ROS: Intubated       Consultants/Specialty  CC/Pulm  Neurosurg  Palliative   Nephro  Cards  PCP: No primary care provider on file.    Disposition  ICU for CVA    Quality Measures  Quality-Core Measures        Physical Exam       Vitals:    19 0500 19 0600 19 0659 19 0700   BP:       Pulse: (!) 127 62  (!) 53   Resp: (!) 29 (!) 32  (!) 26   Temp:       TempSrc:       SpO2: 96% 96% 92% 90%   Weight:       Height:         Body mass index is 25.47 kg/m².    Oxygen Therapy:  Pulse Oximetry: 90 %, FiO2%: 30 %, O2 Delivery:  Ventilator    Physical Exam   Constitutional: He appears unhealthy.   HENT:   Head: Normocephalic and atraumatic.   Eyes: Pupils are equal, round, and reactive to light.   Cardiovascular: Normal rate and regular rhythm.    Pulmonary/Chest: Effort normal and breath sounds normal.   Abdominal: Soft. He exhibits no distension.   Musculoskeletal: He exhibits no edema.   Neurological:   GCS 6t  Right hemiparesis      Skin: Skin is warm and dry.         Lab Data Review:       4/22/2019  12:25 PM    Recent Labs      04/21/19 0345 04/22/19 0345 04/23/19   0300   SODIUM  143  142  137   POTASSIUM  5.0  4.7  4.7   CHLORIDE  105  106  99   CO2  23  20  19*   BUN  55*  69*  59*   CREATININE  8.71*  10.58*  9.63*   MAGNESIUM  2.0  1.9  2.0   PHOSPHORUS  7.9*  8.7*  6.6*   CALCIUM  10.0  9.4  10.8*       Recent Labs      04/20/19   1148 04/21/19 0345 04/22/19 0345 04/23/19   0300   ALTSGPT  <5   --   <5  <5   --    ASTSGOT  8*   --   9*  7*   --    ALKPHOSPHAT  104*   --   93  82   --    TBILIRUBIN  0.5   --   0.5  0.5   --    PREALBUMIN   --    --    --   21.0   --    GLUCOSE  220*   < >  191*  253*  363*    < > = values in this interval not displayed.       Recent Labs      04/21/19 0345 04/22/19 0345 04/23/19   0300   RBC  4.27*  3.55*  4.19*   HEMOGLOBIN  13.1*  10.8*  12.8*   HEMATOCRIT  40.4*  33.9*  40.7*   PLATELETCT  140*  113*  108*   PROTHROMBTM  17.5*  16.1*  16.4*   INR  1.43*  1.27*  1.31*       Recent Labs      04/20/19   1148   04/21/19 0345 04/22/19 0345 04/23/19   0300   WBC   --    < >  10.7  13.7*  11.5*   NEUTSPOLYS   --    < >  78.80*  82.80*  82.80*   LYMPHOCYTES   --    < >  9.70*  6.80*  5.70*   MONOCYTES   --    < >  8.80  9.10  10.20   EOSINOPHILS   --    < >  1.70  0.60  0.30   BASOPHILS   --    < >  0.50  0.30  0.50   ASTSGOT  8*   --   9*  7*   --    ALTSGPT  <5   --   <5  <5   --    ALKPHOSPHAT  104*   --   93  82   --    TBILIRUBIN  0.5   --   0.5  0.5   --     < > =  values in this interval not displayed.       Assessment/Plan     ESRD (end stage renal disease) ([facility])  Previously got PD but advanced to HD   Nephrology following, appreciate their recs    Hypertension  Stable at goal  Continue amlodipine    Diabetes ([facility])  A1C 9.3  Initiate insulin 180 protocol   Hypoglycemic protocol and accuchecks    Hypothyroidism  Continue home synthroid    History of mechanical fall without injury  GLF day prior to admission and was found down the morning of admission.   Fall/Seizure precautions  PT/OT: recommend acute rehab prior to d/c home  Rehab placement on hold for now given deterioration of condition    Hyperlipidemia  Continue Atorvastatin     CVA (cerebral vascular accident) ([facility])  Intubated due to inability to protect airway  Severe aphasia and dense R hemiparesis   SBP goal <140  Continue Atorvastatin    Hold AC  Rehab placement on hold now due to acute deterioration  Palliative following, family will reassess on 4/24    Chronic anticoagulation  For mechanical  St. Alex aortic valve, placed in 1999  INR 4.80 on admission  Cards following, hold AC until cleared with NS  NS following, hold AC for minimum 1 week    Leukocytosis  Bronch on 4/21 showed thick secretions  Continue Unasyn  Monitor BAL and cx     High anion gap metabolic acidosis  Mild likely 2/2 uremia in this ESRD patient.  Improving with HD  Nephology following     Elevated troponin  Not concerning for ACS 2/2 ESRD     Hepatitis A  LFTs wnl  CTM    Acute on chronic systolic heart failure (HCC)  EF 45% with mod RV dysfunction, RVSP 55  Avoid excessive fluids  BP control and monitoring

## 2019-04-24 LAB
BACTERIA BRONCH AEROBE CULT: ABNORMAL
GRAM STN SPEC: ABNORMAL
SIGNIFICANT IND 70042: ABNORMAL
SITE SITE: ABNORMAL
SOURCE SOURCE: ABNORMAL

## 2019-04-24 NOTE — PROGRESS NOTES
Post mortem care done.  Wife left with all pt's belongings.  Full dentures in place per wife's request

## 2019-04-25 NOTE — DOCUMENTATION QUERY
"                                                                         [facility]                                                                       Query Response Note      PATIENT:               GHAZAL BENEDICT  ACCT #:                  [id number]  MRN:                     [id number]  :                      1950  ADMIT DATE:       2019 7:54 AM  DISCH DATE:        2019 3:15 PM  RESPONDING  PROVIDER #:        [id number]           QUERY TEXT:    Encephalopathy is documented in the Medical Record. Please specify type.    NOTE:  If an appropriate response is not listed below, please respond with a new note.    The patient's Clinical Indicators include:   MD Note:\"New onset encephalopathy, possible aspiration pneumonia, new onset subarachnoid hemorrhage.\"  GCS: 8    WBC's: 12.6; Anion Gap: 18.0; Glucose: 220; Creatinine: 9.34; GFR: 6   CT-Head: Left posterior frontal and parietal subarachnoid hemorrhage; focal filling defect in proximal left M2 branch   BAL LLL: Positive - Enterobacter cloacae; Pseudomonas aeruginosa    Treatment: Neurology consult; unasyn; vitamin K to reverse warfarin; nimodipine drip; lab testing; imaging  Risk Factors: CVA; ESRD on HD; DM; aspiration pneumonia    Query created by: Minnie Frederick on 2019 1:58 PM    RESPONSE TEXT:    Unable to determine          Electronically signed by:  MALVIN HUTCHINS MD 2019 2:20 PM              "

## 2019-04-25 NOTE — DOCUMENTATION QUERY
"                                                                         [facility]                                                                       Query Response Note      PATIENT:               GHAZAL BENEDICT  ACCT #:                  [id number]  MRN:                     [id number]  :                      1950  ADMIT DATE:       2019 7:54 AM  DISCH DATE:        2019 3:15 PM  RESPONDING  PROVIDER #:        [id number]           QUERY TEXT:    \"Concerns for sepsis\" is documented in the Medical Record starting . Please clarify whether:    NOTE: If an appropriate response is not listed below, please respond with a new note.    The patient's Clinical Indicators include:   MD Note: \"Aspiration pneumonia, encephalopathy and increasing white count\"   Pulmonary Consult: \"Concerns for sepsis he was given fluids for hypotension\"   SIRS  (WBC's:12.6; HR: 110)   BAL LLL: Positive - Enterobacter cloacae; Pseudomonas aeruginosa  Treatment: IVNS; unasyn; levophed; BAL; lab testing; imaging  Risk Factors: CVA; ESRD; DM; aspiration pneumonia    Query created by: Minnie Frederick on 2019 2:10 PM    RESPONSE TEXT:    Sepsis has been ruled in          Electronically signed by:  CÉSAR MATUTE MD 2019 9:14 PM              "

## 2019-04-26 LAB
BACTERIA BLD CULT: NORMAL
BACTERIA BLD CULT: NORMAL
SIGNIFICANT IND 70042: NORMAL
SIGNIFICANT IND 70042: NORMAL
SITE SITE: NORMAL
SITE SITE: NORMAL
SOURCE SOURCE: NORMAL
SOURCE SOURCE: NORMAL

## 2019-04-27 NOTE — DOCUMENTATION QUERY
"                                                                         [facility]                                                                       Query Response Note      PATIENT:               GHAZAL BENEDICT  ACCT #:                  [id number]  MRN:                     [id number]  :                      1950  ADMIT DATE:       2019 7:54 AM  DISCH DATE:        2019 3:15 PM  RESPONDING  PROVIDER #:        [id number]           QUERY TEXT:    Please clarify in documentation the relationship, if any, between encephalopathy and sepsis.    The patient's Clinical Indicators include:   MD Note: \"Aspiration pneumonia, encephalopathy and increasing white count\"   Pulmonary Consult: \"Concerns for sepsis\"   Query: :Sepsis has been ruled in\"  Treatment: IVNS; unasyn; levophed; BAL; neurology consult; lab testing; imaging  Risk Factors: CVA; ESRD; DM; aspiration pneumonia; sepsis    Query created by: Minnie Frederick on 2019 3:37 PM    RESPONSE TEXT:    Unrelated to each other          Electronically signed by:  CÉSAR MATUTE MD 2019 8:45 PM              "

## 2019-05-12 NOTE — DISCHARGE SUMMARY
Death Summary    Cause of Death  Cardiac arrest due to asystole due to cardioembolic stroke and subarachnoid hemorrhage     Comorbid Conditions at the Time of Death  Principal Problem:    Acute hypoxemic respiratory failure (HCC) POA: Unknown  Active Problems:    CVA (cerebral vascular accident) (HCC) POA: Yes    SAH (subarachnoid hemorrhage) (HCC) POA: Unknown    ESRD (end stage renal disease) (HCC) POA: Yes    Hypertension (Chronic) POA: Yes    Diabetes (HCC) POA: Yes    Chronic anticoagulation POA: Yes    Elevated troponin POA: Yes    Atelectasis POA: Unknown    Aspiration pneumonia (HCC) POA: Unknown    Chronic systolic (congestive) heart failure (HCC) POA: Unknown    Hyperlipidemia (Chronic) POA: Yes    Hypothyroidism (Chronic) POA: Yes    Obstructive sleep apnea (Chronic) POA: Yes    History of mechanical fall without injury POA: Yes    H/O aortic valve replacement POA: Yes    Complete heart block (HCC) POA: Yes    Anemia POA: Yes    Leukocytosis POA: Yes    High anion gap metabolic acidosis POA: Yes    Hepatitis A POA: Yes  Resolved Problems:    * No resolved hospital problems. *      History of Presenting Illness and Hospital Course  69 y.o. male who presented 4/13/2019 with ESRD, CVA, DM, hypothyroidism, dyslipidemia, mechanical st heather valve on chronic anticoagulation, pace maker for 3rd degree block, prior cerebral aneurysm s/p coiling in 1990's. Patient was admitted 4/13 for fall and AMS and supra-therapeutic INR 4.8 and his intial images were negative for acute stroke or findings but neurology thought likely new cardio embolic stroke. He was unable to get MRI. His AMS then worsened and concerns for sepsis developed, he was given fluids for hypotension and a ct head was preformed that showed new SAH. Discussed with neurology, neurosurgery and cardiology and transferred to ICU for monitoring. There he was intubated due to inability to protect his airway. He continued to undergo dialysis with nephrology  following. He was started on Unasyn due to concerns of aspiration pneumonia. BP was controlled appropriately with neurosurg and neurology following. Depsite these efforts pt continued to decline. Palliative was consulted [facility] that [facility] not [facility], son was noted to be DPOA he was contacted and agreed to make pt DNAR/Intubation OK. Pt continued to decline until he went into asystole on 4/23/19, given his wishes and the confirmation of those beliefs by his DPOA no attempt at resuscitation was performed. On exam the patient did not respond to verbal or physical stimuli. Absent heart sounds, 100% dependent on ventilator. Absent peripheral pulses. Pupils are fixed and dilated. Corneal reflexes absent. Magnet acquired for PM deactivation. Ventilator turned off and ET tube removed. Patient pronounced dead at 1515 on 04/23/19. Dr. Joseph notified. Wife present and notified.     Death Date: 04/23/19   Death Time: 1515      Pronounced By (MD): Dr. LORRIE Chacon

## 2019-05-14 NOTE — PROGRESS NOTES
Report given to Jennifer   [No Acute Distress] : no acute distress [Ill-Appearing] : ill-appearing [PERRL] : pupils equal round and reactive to light [EOMI] : extraocular movements intact [No Accessory Muscle Use] : no accessory muscle use [No Lymphadenopathy] : no lymphadenopathy [Normal Rate] : normal rate  [Regular Rhythm] : with a regular rhythm [Soft] : abdomen soft [Non Tender] : non-tender [Normal Bowel Sounds] : normal bowel sounds [Alert and Oriented x3] : oriented to person, place, and time [de-identified] : + boggy nasal turbinates, erythematous throat [de-identified] : + coarse breath sounds in all lung fields

## 2019-05-22 LAB
FUNGUS SPEC CULT: NORMAL
SIGNIFICANT IND 70042: NORMAL
SITE SITE: NORMAL
SOURCE SOURCE: NORMAL

## 2019-06-17 LAB
MYCOBACTERIUM SPEC CULT: NORMAL
RHODAMINE-AURAMINE STN SPEC: NORMAL
SIGNIFICANT IND 70042: NORMAL
SITE SITE: NORMAL
SOURCE SOURCE: NORMAL

## 2021-03-24 NOTE — PROGRESS NOTES
Inpatient Anticoagulation Service Note    Date: 4/18/2019  Reason for Anticoagulation: Aortic Mechanical Valve Replacement  , Stroke            Hemoglobin Value: (!) 13.4  Hematocrit Value: (!) 41.7  Lab Platelet Value: (!) 145  Target INR: 2.0 to 2.5    INR from last 7 days     Date/Time INR Value    04/18/19 0054 (!)  1.52    04/17/19 1619 (!)  1.41    04/17/19 0217 (!)  1.3    04/16/19 0253 (!)  1.47    04/15/19 0336 (!)  3.22    04/14/19 1102 (!)  5.56    04/13/19 0805 (!)  4.8        Dose from last 7 days     Date/Time Dose (mg)    04/18/19 1643  6    04/17/19 1100  6    04/16/19 0800  5    04/15/19 1300  2.5    04/14/19 1400  0        Average Dose (mg):  (Home Dose:  2.5 mg Tu/We and 5 mg all other days)  Significant Interactions: Statin, Thyroid Medications  Bridge Therapy: Yes   INR Value Greater than 2 Prior to Discontinuation of Parenteral Anticoagulation: Not Applicable     Reversal Agent Administered: Not Applicable  Comments: Sub-therapeutic INR with slight upward trend. Weight based heparin drip continues until INR >/=2.  No new warfarin-drug interactions noted.  NNL available for review. No s/sx of overt bleeding noted. Will continue warfarin at 6 mg po again tonight and repeat INR in AM. Pharmacist will continue to monitor closely.     Plan:  Will continue warfarin at 6 mg po again tonight and repeat INR in AM. Pharmacist will continue to monitor closely.   Education Material Provided?: No (chronic warfarin patient)  Pharmacist suggested discharge dosing: Consider resuming home dose of warfarin 2.5 mg on Tue/Wed and 5 mg all other days. Follow up INR within 48 hours of discharge.      Amanda Quinn, PharmD, BCOP              Response sent to patient.

## 2023-09-09 NOTE — THERAPY
I called the patient and talk to his wife concerning his condition, collected the stool specimen for culture on the C. difficile, he may advance his diet to regular diet since he lost weight and he need to gain some weight.  I will call him as soon as I get the results on his C. difficile.  The rest of the lab was good no problem   OT tx attempted.  Pt just going down for CT.  Will attempt back as able.

## 2023-09-26 NOTE — PROGRESS NOTES
Problem: MOBILITY - ADULT  Goal: Maintain or return to baseline ADL function  Description: INTERVENTIONS:  -  Assess patient's ability to carry out ADLs; assess patient's baseline for ADL function and identify physical deficits which impact ability to perform ADLs (bathing, care of mouth/teeth, toileting, grooming, dressing, etc.)  - Assess/evaluate cause of self-care deficits   - Assess range of motion  - Assess patient's mobility; develop plan if impaired  - Assess patient's need for assistive devices and provide as appropriate  - Encourage maximum independence but intervene and supervise when necessary  - Involve family in performance of ADLs  - Assess for home care needs following discharge   - Consider OT consult to assist with ADL evaluation and planning for discharge  - Provide patient education as appropriate  Outcome: Progressing  Goal: Maintains/Returns to pre admission functional level  Description: INTERVENTIONS:  - Perform BMAT or MOVE assessment daily.   - Set and communicate daily mobility goal to care team and patient/family/caregiver. - Collaborate with rehabilitation services on mobility goals if consulted  - Perform Range of Motion 3 times a day. - Reposition patient every 2 hours.   - Dangle patient 3 times a day  - Stand patient 3 times a day  - Ambulate patient 3 times a day  - Out of bed to chair 3 times a day   - Out of bed for meals 3 times a day  - Out of bed for toileting  - Record patient progress and toleration of activity level   Outcome: Progressing This HD RN called for the 2nd time regarding low drain volume alarm problem  Using 1.5% solution and the patient was complaining of abdominal discomfort during the drain 2 and no progression of drain volume noted, tx terminated due to possible absorption of PD fluids approx 1000ml. Dr Hernandez notified with order to use 2.5% solution and  to finish the remaining 3 cycles. Tx restarted @ 0315 . Report given to NANY Guerrier. See flow sheet for details.The process of troubleshooting started @ 0205 and ended @ 0415